# Patient Record
Sex: FEMALE | Race: WHITE | Employment: OTHER | URBAN - NONMETROPOLITAN AREA
[De-identification: names, ages, dates, MRNs, and addresses within clinical notes are randomized per-mention and may not be internally consistent; named-entity substitution may affect disease eponyms.]

---

## 2017-03-16 ENCOUNTER — HOSPITAL ENCOUNTER (OUTPATIENT)
Age: 62
Discharge: HOME | End: 2017-03-16
Payer: MEDICARE

## 2017-03-16 LAB
ANION GAP SERPL CALCULATED.3IONS-SCNC: 11 MMOL/L (ref 9–17)
BUN BLDV-MCNC: 27 MG/DL (ref 8–23)
BUN/CREAT BLD: 14 (ref 9–20)
CALCIUM IONIZED: 1.39 MMOL/L (ref 1.13–1.33)
CALCIUM SERPL-MCNC: 9 MG/DL (ref 8.6–10.4)
CHLORIDE BLD-SCNC: 110 MMOL/L (ref 98–107)
CO2: 22 MMOL/L (ref 20–31)
CREAT SERPL-MCNC: 1.9 MG/DL (ref 0.5–0.9)
CREATININE URINE: 52.7 MG/DL (ref 28–217)
GFR AFRICAN AMERICAN: 32 ML/MIN
GFR NON-AFRICAN AMERICAN: 27 ML/MIN
GFR SERPL CREATININE-BSD FRML MDRD: ABNORMAL ML/MIN/{1.73_M2}
GFR SERPL CREATININE-BSD FRML MDRD: ABNORMAL ML/MIN/{1.73_M2}
GLUCOSE BLD-MCNC: 97 MG/DL (ref 70–99)
POTASSIUM SERPL-SCNC: 5.2 MMOL/L (ref 3.7–5.3)
PTH INTACT: 141 PG/ML (ref 15–65)
SODIUM BLD-SCNC: 143 MMOL/L (ref 135–144)
TOTAL PROTEIN, URINE: 23 MG/DL
VITAMIN D 25-HYDROXY: 18.2 NG/ML (ref 30–100)

## 2017-03-16 PROCEDURE — 82330 ASSAY OF CALCIUM: CPT

## 2017-03-16 PROCEDURE — 36415 COLL VENOUS BLD VENIPUNCTURE: CPT

## 2017-03-16 PROCEDURE — 82306 VITAMIN D 25 HYDROXY: CPT

## 2017-03-16 PROCEDURE — 83970 ASSAY OF PARATHORMONE: CPT

## 2017-03-16 PROCEDURE — 80048 BASIC METABOLIC PNL TOTAL CA: CPT

## 2017-03-16 PROCEDURE — 84156 ASSAY OF PROTEIN URINE: CPT

## 2017-03-16 PROCEDURE — 82570 ASSAY OF URINE CREATININE: CPT

## 2017-05-13 ENCOUNTER — APPOINTMENT (OUTPATIENT)
Dept: GENERAL RADIOLOGY | Age: 62
End: 2017-05-13
Payer: MEDICARE

## 2017-05-13 ENCOUNTER — HOSPITAL ENCOUNTER (EMERGENCY)
Age: 62
Discharge: LEFT BEFORE DISCHARGE | End: 2017-05-13
Attending: EMERGENCY MEDICINE
Payer: MEDICARE

## 2017-05-13 VITALS
TEMPERATURE: 97.8 F | DIASTOLIC BLOOD PRESSURE: 72 MMHG | OXYGEN SATURATION: 95 % | HEART RATE: 54 BPM | SYSTOLIC BLOOD PRESSURE: 117 MMHG | RESPIRATION RATE: 16 BRPM

## 2017-05-13 DIAGNOSIS — M79.601 PAIN OF RIGHT UPPER EXTREMITY: Primary | ICD-10-CM

## 2017-05-13 LAB
% CKMB: 2 % (ref 0–3)
ABSOLUTE EOS #: 0.3 K/UL (ref 0–0.4)
ABSOLUTE LYMPH #: 1.8 K/UL (ref 1–4.8)
ABSOLUTE MONO #: 0.5 K/UL (ref 0–1)
ANION GAP SERPL CALCULATED.3IONS-SCNC: 12 MMOL/L (ref 9–17)
BASOPHILS # BLD: 1 %
BASOPHILS ABSOLUTE: 0 K/UL (ref 0–0.2)
BUN BLDV-MCNC: 33 MG/DL (ref 8–23)
BUN/CREAT BLD: 15 (ref 9–20)
CALCIUM SERPL-MCNC: 8.8 MG/DL (ref 8.6–10.4)
CHLORIDE BLD-SCNC: 108 MMOL/L (ref 98–107)
CK MB: 1.1 NG/ML
CK MB: 1.2 NG/ML
CKMB INTERPRETATION: NORMAL
CO2: 22 MMOL/L (ref 20–31)
CREAT SERPL-MCNC: 2.17 MG/DL (ref 0.5–0.9)
DIFFERENTIAL TYPE: ABNORMAL
EOSINOPHILS RELATIVE PERCENT: 4 %
GFR AFRICAN AMERICAN: 28 ML/MIN
GFR NON-AFRICAN AMERICAN: 23 ML/MIN
GFR SERPL CREATININE-BSD FRML MDRD: ABNORMAL ML/MIN/{1.73_M2}
GFR SERPL CREATININE-BSD FRML MDRD: ABNORMAL ML/MIN/{1.73_M2}
GLUCOSE BLD-MCNC: 102 MG/DL (ref 70–99)
HCT VFR BLD CALC: 35.1 % (ref 36–46)
HEMOGLOBIN: 11.3 G/DL (ref 12–16)
INR BLD: 1 (ref 0.9–1.2)
LYMPHOCYTES # BLD: 26 %
MCH RBC QN AUTO: 28.8 PG (ref 26–34)
MCHC RBC AUTO-ENTMCNC: 32.3 G/DL (ref 31–37)
MCV RBC AUTO: 89.2 FL (ref 80–100)
MONOCYTES # BLD: 8 %
MYOGLOBIN: 43 NG/ML (ref 25–58)
PARTIAL THROMBOPLASTIN TIME: 29.2 SEC (ref 23.2–34.4)
PDW BLD-RTO: 14.6 % (ref 12.1–15.2)
PLATELET # BLD: 151 K/UL (ref 140–450)
PLATELET ESTIMATE: ABNORMAL
PMV BLD AUTO: ABNORMAL FL (ref 6–12)
POTASSIUM SERPL-SCNC: 4.8 MMOL/L (ref 3.7–5.3)
PROTHROMBIN TIME: 10.2 SEC (ref 9.7–12.2)
RBC # BLD: 3.94 M/UL (ref 4–5.2)
RBC # BLD: ABNORMAL 10*6/UL
SEG NEUTROPHILS: 61 %
SEGMENTED NEUTROPHILS ABSOLUTE COUNT: 4.2 K/UL (ref 1.8–7.7)
SODIUM BLD-SCNC: 142 MMOL/L (ref 135–144)
TOTAL CK: 54 U/L (ref 26–192)
TROPONIN INTERP: NORMAL
TROPONIN INTERP: NORMAL
TROPONIN T: <0.03 NG/ML
TROPONIN T: <0.03 NG/ML
WBC # BLD: 6.8 K/UL (ref 3.5–11)
WBC # BLD: ABNORMAL 10*3/UL

## 2017-05-13 PROCEDURE — 99283 EMERGENCY DEPT VISIT LOW MDM: CPT

## 2017-05-13 PROCEDURE — 36415 COLL VENOUS BLD VENIPUNCTURE: CPT

## 2017-05-13 PROCEDURE — 85730 THROMBOPLASTIN TIME PARTIAL: CPT

## 2017-05-13 PROCEDURE — 85610 PROTHROMBIN TIME: CPT

## 2017-05-13 PROCEDURE — 84484 ASSAY OF TROPONIN QUANT: CPT

## 2017-05-13 PROCEDURE — 80048 BASIC METABOLIC PNL TOTAL CA: CPT

## 2017-05-13 PROCEDURE — 71010 XR CHEST PORTABLE: CPT

## 2017-05-13 PROCEDURE — 6370000000 HC RX 637 (ALT 250 FOR IP): Performed by: EMERGENCY MEDICINE

## 2017-05-13 PROCEDURE — 82550 ASSAY OF CK (CPK): CPT

## 2017-05-13 PROCEDURE — 85025 COMPLETE CBC W/AUTO DIFF WBC: CPT

## 2017-05-13 PROCEDURE — 83874 ASSAY OF MYOGLOBIN: CPT

## 2017-05-13 PROCEDURE — 82553 CREATINE MB FRACTION: CPT

## 2017-05-13 PROCEDURE — 93005 ELECTROCARDIOGRAM TRACING: CPT

## 2017-05-13 RX ORDER — ASPIRIN 81 MG/1
324 TABLET, CHEWABLE ORAL ONCE
Status: DISCONTINUED | OUTPATIENT
Start: 2017-05-13 | End: 2017-05-13 | Stop reason: HOSPADM

## 2017-05-13 RX ORDER — ASPIRIN 81 MG/1
324 TABLET, CHEWABLE ORAL ONCE
Status: COMPLETED | OUTPATIENT
Start: 2017-05-13 | End: 2017-05-13

## 2017-05-13 RX ADMIN — ASPIRIN 81 MG 324 MG: 81 TABLET ORAL at 06:22

## 2017-05-13 RX ADMIN — NITROGLYCERIN 0.5 INCH: 20 OINTMENT TOPICAL at 06:23

## 2017-05-13 ASSESSMENT — PAIN SCALES - GENERAL
PAINLEVEL_OUTOF10: 3
PAINLEVEL_OUTOF10: 2

## 2017-05-13 ASSESSMENT — PAIN DESCRIPTION - ORIENTATION
ORIENTATION: LEFT
ORIENTATION: LEFT

## 2017-05-13 ASSESSMENT — ENCOUNTER SYMPTOMS
ALLERGIC/IMMUNOLOGIC NEGATIVE: 1
EYES NEGATIVE: 1
GASTROINTESTINAL NEGATIVE: 1
RESPIRATORY NEGATIVE: 1

## 2017-05-13 ASSESSMENT — PAIN DESCRIPTION - LOCATION
LOCATION: ARM
LOCATION: ARM

## 2017-05-13 ASSESSMENT — PAIN DESCRIPTION - DESCRIPTORS: DESCRIPTORS: ACHING;SHARP

## 2017-05-13 ASSESSMENT — PAIN DESCRIPTION - PAIN TYPE: TYPE: ACUTE PAIN

## 2017-05-14 LAB
EKG ATRIAL RATE: 52 BPM
EKG ATRIAL RATE: 55 BPM
EKG P AXIS: 32 DEGREES
EKG P AXIS: 41 DEGREES
EKG P-R INTERVAL: 194 MS
EKG P-R INTERVAL: 194 MS
EKG Q-T INTERVAL: 422 MS
EKG Q-T INTERVAL: 444 MS
EKG QRS DURATION: 80 MS
EKG QRS DURATION: 88 MS
EKG QTC CALCULATION (BAZETT): 403 MS
EKG QTC CALCULATION (BAZETT): 412 MS
EKG R AXIS: -12 DEGREES
EKG R AXIS: 15 DEGREES
EKG T AXIS: 13 DEGREES
EKG T AXIS: 41 DEGREES
EKG VENTRICULAR RATE: 52 BPM
EKG VENTRICULAR RATE: 55 BPM

## 2017-05-15 ENCOUNTER — HOSPITAL ENCOUNTER (OUTPATIENT)
Dept: NON INVASIVE DIAGNOSTICS | Age: 62
Discharge: HOME | End: 2017-05-15

## 2017-05-15 ENCOUNTER — HOSPITAL ENCOUNTER (OUTPATIENT)
Dept: NON INVASIVE DIAGNOSTICS | Age: 62
Discharge: HOME | End: 2017-05-15
Payer: MEDICARE

## 2017-05-15 PROCEDURE — 93017 CV STRESS TEST TRACING ONLY: CPT

## 2017-05-15 PROCEDURE — 3430000000 HC RX DIAGNOSTIC RADIOPHARMACEUTICAL: Performed by: EMERGENCY MEDICINE

## 2017-05-15 PROCEDURE — 78452 HT MUSCLE IMAGE SPECT MULT: CPT

## 2017-05-15 PROCEDURE — 6360000002 HC RX W HCPCS: Performed by: FAMILY MEDICINE

## 2017-05-15 PROCEDURE — A9500 TC99M SESTAMIBI: HCPCS | Performed by: EMERGENCY MEDICINE

## 2017-05-15 RX ADMIN — REGADENOSON 0.4 MG: 0.08 INJECTION, SOLUTION INTRAVENOUS at 11:50

## 2017-05-15 RX ADMIN — Medication 10 MILLICURIE: at 10:46

## 2017-05-15 RX ADMIN — Medication 30 MILLICURIE: at 11:40

## 2018-02-22 ENCOUNTER — TELEPHONE (OUTPATIENT)
Dept: OBGYN | Age: 63
End: 2018-02-22

## 2018-05-31 ENCOUNTER — APPOINTMENT (OUTPATIENT)
Dept: CT IMAGING | Age: 63
End: 2018-05-31
Payer: MEDICARE

## 2018-05-31 ENCOUNTER — HOSPITAL ENCOUNTER (EMERGENCY)
Age: 63
Discharge: HOME | End: 2018-05-31
Attending: EMERGENCY MEDICINE
Payer: MEDICARE

## 2018-05-31 VITALS
RESPIRATION RATE: 13 BRPM | DIASTOLIC BLOOD PRESSURE: 94 MMHG | OXYGEN SATURATION: 95 % | HEART RATE: 58 BPM | SYSTOLIC BLOOD PRESSURE: 141 MMHG

## 2018-05-31 DIAGNOSIS — M79.601 RIGHT ARM PAIN: ICD-10-CM

## 2018-05-31 DIAGNOSIS — M79.602 LEFT ARM PAIN: Primary | ICD-10-CM

## 2018-05-31 LAB
ABSOLUTE EOS #: 0.15 K/UL (ref 0–0.44)
ABSOLUTE IMMATURE GRANULOCYTE: <0.03 K/UL (ref 0–0.3)
ABSOLUTE LYMPH #: 0.97 K/UL (ref 1.1–3.7)
ABSOLUTE MONO #: 0.54 K/UL (ref 0.1–1.2)
ALBUMIN SERPL-MCNC: 3.5 G/DL (ref 3.5–5.2)
ALBUMIN/GLOBULIN RATIO: 1.1 (ref 1–2.5)
ALP BLD-CCNC: 98 U/L (ref 35–104)
ALT SERPL-CCNC: 14 U/L (ref 5–33)
ANION GAP SERPL CALCULATED.3IONS-SCNC: 12 MMOL/L (ref 9–17)
AST SERPL-CCNC: 10 U/L
BASOPHILS # BLD: 0 % (ref 0–2)
BASOPHILS ABSOLUTE: 0.03 K/UL (ref 0–0.2)
BILIRUB SERPL-MCNC: <0.1 MG/DL (ref 0.3–1.2)
BUN BLDV-MCNC: 40 MG/DL (ref 8–23)
BUN/CREAT BLD: 16 (ref 9–20)
CALCIUM SERPL-MCNC: 8.9 MG/DL (ref 8.6–10.4)
CHLORIDE BLD-SCNC: 109 MMOL/L (ref 98–107)
CK MB: 1.9 NG/ML
CO2: 21 MMOL/L (ref 20–31)
CREAT SERPL-MCNC: 2.58 MG/DL (ref 0.5–0.9)
DIFFERENTIAL TYPE: ABNORMAL
EKG ATRIAL RATE: 58 BPM
EKG P AXIS: 72 DEGREES
EKG P-R INTERVAL: 198 MS
EKG Q-T INTERVAL: 392 MS
EKG QRS DURATION: 72 MS
EKG QTC CALCULATION (BAZETT): 384 MS
EKG R AXIS: -12 DEGREES
EKG T AXIS: 15 DEGREES
EKG VENTRICULAR RATE: 58 BPM
EOSINOPHILS RELATIVE PERCENT: 2 % (ref 1–4)
GFR AFRICAN AMERICAN: 23 ML/MIN
GFR NON-AFRICAN AMERICAN: 19 ML/MIN
GFR SERPL CREATININE-BSD FRML MDRD: ABNORMAL ML/MIN/{1.73_M2}
GFR SERPL CREATININE-BSD FRML MDRD: ABNORMAL ML/MIN/{1.73_M2}
GLUCOSE BLD-MCNC: 81 MG/DL
GLUCOSE BLD-MCNC: 81 MG/DL (ref 74–100)
GLUCOSE BLD-MCNC: 91 MG/DL (ref 70–99)
HCT VFR BLD CALC: 34.8 % (ref 36.3–47.1)
HEMOGLOBIN: 10.9 G/DL (ref 11.9–15.1)
IMMATURE GRANULOCYTES: 0 %
LYMPHOCYTES # BLD: 13 % (ref 24–43)
MCH RBC QN AUTO: 29.6 PG (ref 25.2–33.5)
MCHC RBC AUTO-ENTMCNC: 31.3 G/DL (ref 28.4–34.8)
MCV RBC AUTO: 94.6 FL (ref 82.6–102.9)
MONOCYTES # BLD: 7 % (ref 3–12)
MYOGLOBIN: 52 NG/ML (ref 25–58)
NRBC AUTOMATED: 0 PER 100 WBC
PDW BLD-RTO: 13.2 % (ref 11.8–14.4)
PLATELET # BLD: 155 K/UL (ref 138–453)
PLATELET ESTIMATE: ABNORMAL
PMV BLD AUTO: 10.1 FL (ref 8.1–13.5)
POTASSIUM SERPL-SCNC: 5.3 MMOL/L (ref 3.7–5.3)
RBC # BLD: 3.68 M/UL (ref 3.95–5.11)
RBC # BLD: ABNORMAL 10*6/UL
SEG NEUTROPHILS: 78 % (ref 36–65)
SEGMENTED NEUTROPHILS ABSOLUTE COUNT: 5.92 K/UL (ref 1.5–8.1)
SODIUM BLD-SCNC: 142 MMOL/L (ref 135–144)
TOTAL PROTEIN: 6.7 G/DL (ref 6.4–8.3)
TROPONIN INTERP: NORMAL
TROPONIN T: <0.03 NG/ML
WBC # BLD: 7.6 K/UL (ref 3.5–11.3)
WBC # BLD: ABNORMAL 10*3/UL

## 2018-05-31 PROCEDURE — 70450 CT HEAD/BRAIN W/O DYE: CPT

## 2018-05-31 PROCEDURE — 2580000003 HC RX 258: Performed by: EMERGENCY MEDICINE

## 2018-05-31 PROCEDURE — 80053 COMPREHEN METABOLIC PANEL: CPT

## 2018-05-31 PROCEDURE — 99284 EMERGENCY DEPT VISIT MOD MDM: CPT

## 2018-05-31 PROCEDURE — 85025 COMPLETE CBC W/AUTO DIFF WBC: CPT

## 2018-05-31 PROCEDURE — 82947 ASSAY GLUCOSE BLOOD QUANT: CPT

## 2018-05-31 PROCEDURE — 93005 ELECTROCARDIOGRAM TRACING: CPT

## 2018-05-31 PROCEDURE — 83874 ASSAY OF MYOGLOBIN: CPT

## 2018-05-31 PROCEDURE — 84484 ASSAY OF TROPONIN QUANT: CPT

## 2018-05-31 PROCEDURE — 82553 CREATINE MB FRACTION: CPT

## 2018-05-31 PROCEDURE — 72125 CT NECK SPINE W/O DYE: CPT

## 2018-05-31 PROCEDURE — 36415 COLL VENOUS BLD VENIPUNCTURE: CPT

## 2018-05-31 RX ORDER — 0.9 % SODIUM CHLORIDE 0.9 %
500 INTRAVENOUS SOLUTION INTRAVENOUS ONCE
Status: COMPLETED | OUTPATIENT
Start: 2018-05-31 | End: 2018-05-31

## 2018-05-31 RX ADMIN — SODIUM CHLORIDE 500 ML: 9 INJECTION, SOLUTION INTRAVENOUS at 15:10

## 2018-12-31 ENCOUNTER — HOSPITAL ENCOUNTER (OUTPATIENT)
Age: 63
Discharge: HOME | End: 2018-12-31
Payer: MEDICARE

## 2018-12-31 DIAGNOSIS — N18.30 CKD (CHRONIC KIDNEY DISEASE), STAGE III (HCC): ICD-10-CM

## 2018-12-31 LAB
ANION GAP SERPL CALCULATED.3IONS-SCNC: 11 MMOL/L (ref 9–17)
BUN BLDV-MCNC: 29 MG/DL (ref 8–23)
BUN/CREAT BLD: 12 (ref 9–20)
CALCIUM SERPL-MCNC: 9 MG/DL (ref 8.6–10.4)
CHLORIDE BLD-SCNC: 112 MMOL/L (ref 98–107)
CO2: 19 MMOL/L (ref 20–31)
CREAT SERPL-MCNC: 2.34 MG/DL (ref 0.5–0.9)
GFR AFRICAN AMERICAN: 25 ML/MIN
GFR NON-AFRICAN AMERICAN: 21 ML/MIN
GFR SERPL CREATININE-BSD FRML MDRD: ABNORMAL ML/MIN/{1.73_M2}
GFR SERPL CREATININE-BSD FRML MDRD: ABNORMAL ML/MIN/{1.73_M2}
GLUCOSE BLD-MCNC: 85 MG/DL (ref 70–99)
POTASSIUM SERPL-SCNC: 5.1 MMOL/L (ref 3.7–5.3)
SODIUM BLD-SCNC: 142 MMOL/L (ref 135–144)

## 2018-12-31 PROCEDURE — 80048 BASIC METABOLIC PNL TOTAL CA: CPT

## 2018-12-31 PROCEDURE — 36415 COLL VENOUS BLD VENIPUNCTURE: CPT

## 2019-01-02 DIAGNOSIS — N18.30 CKD (CHRONIC KIDNEY DISEASE) STAGE 3, GFR 30-59 ML/MIN (HCC): Primary | Chronic | ICD-10-CM

## 2019-01-02 RX ORDER — SODIUM BICARBONATE 650 MG/1
650 TABLET ORAL 2 TIMES DAILY
Qty: 60 TABLET | Refills: 3 | Status: SHIPPED | OUTPATIENT
Start: 2019-01-02 | End: 2019-02-01

## 2019-03-29 ENCOUNTER — APPOINTMENT (OUTPATIENT)
Dept: CT IMAGING | Age: 64
End: 2019-03-29
Payer: MEDICARE

## 2019-03-29 ENCOUNTER — HOSPITAL ENCOUNTER (EMERGENCY)
Age: 64
Discharge: HOME | End: 2019-03-29
Payer: MEDICARE

## 2019-03-29 VITALS
OXYGEN SATURATION: 92 % | SYSTOLIC BLOOD PRESSURE: 121 MMHG | TEMPERATURE: 98.2 F | DIASTOLIC BLOOD PRESSURE: 46 MMHG | RESPIRATION RATE: 16 BRPM | HEART RATE: 76 BPM

## 2019-03-29 DIAGNOSIS — R10.30 LOWER ABDOMINAL PAIN: Primary | ICD-10-CM

## 2019-03-29 LAB
-: ABNORMAL
ABSOLUTE EOS #: 0.25 K/UL (ref 0–0.44)
ABSOLUTE IMMATURE GRANULOCYTE: 0.04 K/UL (ref 0–0.3)
ABSOLUTE LYMPH #: 1.27 K/UL (ref 1.1–3.7)
ABSOLUTE MONO #: 0.79 K/UL (ref 0.1–1.2)
ALBUMIN SERPL-MCNC: 3.9 G/DL (ref 3.5–5.2)
ALBUMIN/GLOBULIN RATIO: 1.1 (ref 1–2.5)
ALP BLD-CCNC: 103 U/L (ref 35–104)
ALT SERPL-CCNC: 15 U/L (ref 5–33)
AMORPHOUS: ABNORMAL
ANION GAP SERPL CALCULATED.3IONS-SCNC: 13 MMOL/L (ref 9–17)
AST SERPL-CCNC: 13 U/L
BACTERIA: ABNORMAL
BASOPHILS # BLD: 0 % (ref 0–2)
BASOPHILS ABSOLUTE: 0.04 K/UL (ref 0–0.2)
BILIRUB SERPL-MCNC: 0.16 MG/DL (ref 0.3–1.2)
BILIRUBIN URINE: NEGATIVE
BUN BLDV-MCNC: 35 MG/DL (ref 8–23)
BUN/CREAT BLD: 14 (ref 9–20)
CALCIUM SERPL-MCNC: 9.6 MG/DL (ref 8.6–10.4)
CASTS UA: ABNORMAL /LPF
CHLORIDE BLD-SCNC: 105 MMOL/L (ref 98–107)
CO2: 18 MMOL/L (ref 20–31)
COLOR: YELLOW
COMMENT UA: ABNORMAL
CREAT SERPL-MCNC: 2.51 MG/DL (ref 0.5–0.9)
CRYSTALS, UA: ABNORMAL /HPF
DIFFERENTIAL TYPE: ABNORMAL
EOSINOPHILS RELATIVE PERCENT: 2 % (ref 1–4)
EPITHELIAL CELLS UA: ABNORMAL /HPF (ref 0–25)
GFR AFRICAN AMERICAN: 23 ML/MIN
GFR NON-AFRICAN AMERICAN: 19 ML/MIN
GFR SERPL CREATININE-BSD FRML MDRD: ABNORMAL ML/MIN/{1.73_M2}
GFR SERPL CREATININE-BSD FRML MDRD: ABNORMAL ML/MIN/{1.73_M2}
GLUCOSE BLD-MCNC: 95 MG/DL (ref 70–99)
GLUCOSE URINE: NEGATIVE
HCT VFR BLD CALC: 35.9 % (ref 36.3–47.1)
HEMOGLOBIN: 10.8 G/DL (ref 11.9–15.1)
IMMATURE GRANULOCYTES: 0 %
KETONES, URINE: NEGATIVE
LACTIC ACID, WHOLE BLOOD: NORMAL MMOL/L (ref 0.7–2.1)
LACTIC ACID: 0.8 MMOL/L (ref 0.5–2.2)
LEUKOCYTE ESTERASE, URINE: NEGATIVE
LIPASE: 37 U/L (ref 13–60)
LYMPHOCYTES # BLD: 12 % (ref 24–43)
MCH RBC QN AUTO: 29.3 PG (ref 25.2–33.5)
MCHC RBC AUTO-ENTMCNC: 30.1 G/DL (ref 28.4–34.8)
MCV RBC AUTO: 97.3 FL (ref 82.6–102.9)
MONOCYTES # BLD: 7 % (ref 3–12)
MUCUS: ABNORMAL
NITRITE, URINE: NEGATIVE
NRBC AUTOMATED: 0 PER 100 WBC
OTHER OBSERVATIONS UA: ABNORMAL
PDW BLD-RTO: 13.4 % (ref 11.8–14.4)
PH UA: 5.5 (ref 5–9)
PLATELET # BLD: 155 K/UL (ref 138–453)
PLATELET ESTIMATE: ABNORMAL
PMV BLD AUTO: 11.4 FL (ref 8.1–13.5)
POTASSIUM SERPL-SCNC: 5 MMOL/L (ref 3.7–5.3)
PROTEIN UA: ABNORMAL
RBC # BLD: 3.69 M/UL (ref 3.95–5.11)
RBC # BLD: ABNORMAL 10*6/UL
RBC UA: ABNORMAL /HPF (ref 0–2)
RENAL EPITHELIAL, UA: ABNORMAL /HPF
SEG NEUTROPHILS: 79 % (ref 36–65)
SEGMENTED NEUTROPHILS ABSOLUTE COUNT: 8.65 K/UL (ref 1.5–8.1)
SODIUM BLD-SCNC: 136 MMOL/L (ref 135–144)
SPECIFIC GRAVITY UA: 1.02 (ref 1.01–1.02)
TOTAL PROTEIN: 7.5 G/DL (ref 6.4–8.3)
TRICHOMONAS: ABNORMAL
TURBIDITY: CLEAR
URINE HGB: ABNORMAL
UROBILINOGEN, URINE: NORMAL
WBC # BLD: 11 K/UL (ref 3.5–11.3)
WBC # BLD: ABNORMAL 10*3/UL
WBC UA: ABNORMAL /HPF (ref 0–5)
YEAST: ABNORMAL

## 2019-03-29 PROCEDURE — 36415 COLL VENOUS BLD VENIPUNCTURE: CPT

## 2019-03-29 PROCEDURE — 96375 TX/PRO/DX INJ NEW DRUG ADDON: CPT

## 2019-03-29 PROCEDURE — 83605 ASSAY OF LACTIC ACID: CPT

## 2019-03-29 PROCEDURE — 85025 COMPLETE CBC W/AUTO DIFF WBC: CPT

## 2019-03-29 PROCEDURE — 83690 ASSAY OF LIPASE: CPT

## 2019-03-29 PROCEDURE — 74176 CT ABD & PELVIS W/O CONTRAST: CPT

## 2019-03-29 PROCEDURE — 80053 COMPREHEN METABOLIC PANEL: CPT

## 2019-03-29 PROCEDURE — 6360000002 HC RX W HCPCS: Performed by: PHYSICIAN ASSISTANT

## 2019-03-29 PROCEDURE — 81001 URINALYSIS AUTO W/SCOPE: CPT

## 2019-03-29 PROCEDURE — 96374 THER/PROPH/DIAG INJ IV PUSH: CPT

## 2019-03-29 PROCEDURE — 99284 EMERGENCY DEPT VISIT MOD MDM: CPT

## 2019-03-29 RX ORDER — PROMETHAZINE HYDROCHLORIDE 25 MG/ML
12.5 INJECTION, SOLUTION INTRAMUSCULAR; INTRAVENOUS ONCE
Status: COMPLETED | OUTPATIENT
Start: 2019-03-29 | End: 2019-03-29

## 2019-03-29 RX ORDER — MORPHINE SULFATE 4 MG/ML
4 INJECTION, SOLUTION INTRAMUSCULAR; INTRAVENOUS ONCE
Status: COMPLETED | OUTPATIENT
Start: 2019-03-29 | End: 2019-03-29

## 2019-03-29 RX ORDER — HYDROCODONE BITARTRATE AND ACETAMINOPHEN 5; 325 MG/1; MG/1
1 TABLET ORAL EVERY 6 HOURS PRN
Qty: 10 TABLET | Refills: 0 | Status: ON HOLD | OUTPATIENT
Start: 2019-03-29 | End: 2019-04-05 | Stop reason: HOSPADM

## 2019-03-29 RX ADMIN — MORPHINE SULFATE 4 MG: 4 INJECTION INTRAVENOUS at 17:09

## 2019-03-29 RX ADMIN — PROMETHAZINE HYDROCHLORIDE 12.5 MG: 25 INJECTION INTRAMUSCULAR; INTRAVENOUS at 17:06

## 2019-03-29 ASSESSMENT — PAIN DESCRIPTION - ORIENTATION: ORIENTATION: LOWER

## 2019-03-29 ASSESSMENT — PAIN SCALES - GENERAL
PAINLEVEL_OUTOF10: 0
PAINLEVEL_OUTOF10: 10

## 2019-03-29 ASSESSMENT — PAIN DESCRIPTION - PAIN TYPE: TYPE: ACUTE PAIN

## 2019-03-29 ASSESSMENT — PAIN DESCRIPTION - LOCATION: LOCATION: ABDOMEN

## 2019-03-29 NOTE — ED NOTES
Discussed discharge instructions with patient and daughter. Per Radiology, patient informed to call scheduling on Monday morning to schedule outpatient US.       Molly Lee RN  03/29/19 2399

## 2019-03-29 NOTE — ED PROVIDER NOTES
[facility] ED  EMERGENCY DEPARTMENT ENCOUNTER      Pt Name: Sarthak Keys  MRN: [id number]  [place] 1955  Date of evaluation: 3/29/2019  Provider: Rodrigo Zavala PA-C    CHIEF COMPLAINT       Chief Complaint   Patient presents with    Abdominal Pain     lower abd pain        HISTORY OF PRESENT ILLNESS    Sarthak Keys is a 70 y.o. female who presents to the emergency department from home with complaint of lower abdominal pain. The patient has acute communication issues due to chronic expressive aphasia from previous cerebral aneurysm and hemorrhage. I do also obtained some history from her daughter who arrives after I initially saw the patient. The patient is able to communicate somewhat indicating that she has sure pain in her lower abdomen and does not radiate to her back or chest started around noon today and is progressively worse no fever or vomiting she states it does hurt to urinate. Triage notes and Nursing notes were reviewed by myself. Any discrepancies are addressed above. PAST MEDICAL HISTORY     Past Medical History:   Diagnosis Date    Cerebral aneurysm 1997    Chronic renal insufficiency     Intracranial hemorrhage ([facility] [address] 1997    Obesity     Respiratory failure, acute ([address] 1997    Seizures ([facility] [address]     no seizure since before 2002       SURGICAL HISTORY       Past Surgical History:   Procedure Laterality Date    CRANIOTOMY      microdissection    GASTROSTOMY TUBE PLACEMENT      TRACHEOSTOMY         CURRENT MEDICATIONS       Previous Medications    ASPIRIN 81 MG EC TABLET    Take 81 mg by mouth daily. BELLADONNA ALK-PHENOBARBITAL PO    Take  by mouth. CARBAMAZEPINE (TEGRETOL XR) 100 MG SR TABLET    Take 100 mg by mouth 2 times daily. ENALAPRIL (VASOTEC) 2.5 MG TABLET    Take 2.5 mg by mouth daily. HYDROCODONE-ACETAMINOPHEN (VICODIN) 5-500 MG PER TABLET    Take 1 tablet by mouth every 6 hours as needed.       MELATONIN 3 MG TABS    Take 3 mg by mouth nightly. METFORMIN (GLUCOPHAGE) 500 MG TABLET    Take 500 mg by mouth daily (with breakfast). VITAMIN D (CHOLECALCIFEROL) 54483 UNIT CAPS    Take 1 capsule by mouth once a week for 8 doses And then one cap every month for the next 4 months       ALLERGIES     Pcn [penicillins]    FAMILY HISTORY     History reviewed. No pertinent family history. SOCIAL HISTORY       Social History     Socioeconomic History    Marital status:      Spouse name: None    Number of children: None    Years of education: None    Highest education level: None   Occupational History    None   Social Needs    Financial resource strain: None    Food insecurity:     Worry: None     Inability: None    Transportation needs:     Medical: None     Non-medical: None   Tobacco Use    Smoking status: Former Smoker     Packs/day: 1.00     Years: 15.00     Pack years: 15.00     Types: Cigarettes    Smokeless tobacco: Never Used   Substance and Sexual Activity    Alcohol use: No    Drug use: None    Sexual activity: None   Lifestyle    Physical activity:     Days per week: None     Minutes per session: None    Stress: None   Relationships    Social connections:     Talks on phone: None     Gets together: None     Attends Mosque service: None     Active member of club or organization: None     Attends meetings of clubs or organizations: None     Relationship status: None    Intimate partner violence:     Fear of current or ex partner: None     Emotionally abused: None     Physically abused: None     Forced sexual activity: None   Other Topics Concern    None   Social History Narrative    None       REVIEW OF SYSTEMS     Review of Systems  Except as noted above the remainder of the review of systems was reviewed and is negative.      PHYSICAL EXAM    (up to 7 for level 4, 8 or more for level 5)     ED Triage Vitals [03/29/19 1638]   BP Temp Temp Source Pulse Resp SpO2 Height Weight   (!) 168/94 98.2 °F (36.8 °C) Tympanic 76 16 93 % -- --       Physical Exam  Active and oriented ×3. Nontoxic. No acute distress. Well-hydrated. Orbitally obese, patient is anxious pacing around the room on initial evaluation  Head is atraumatic, facies symmetrical.  Pupils equal round and reactive to light, extraocular movements intact, anterior chambers clear bilaterally  Neck is supple. No adenopathy. Respirations nonlabored. Lungs clear to auscultation. No wheezes rales or rhonchi noted. Heart regular rate and rhythm. No murmur noted  Abdomen positive bowel sounds, no bruit. Large panus noted, tenderness noted in the lower abdomen bilaterally with no rigidity noted. No pulsatile mass noted  Skin free of any obvious rashes or lesions. Extremities without edema. No calf tenderness noted. Distal pulses and sensation intact. Good capillary refill noted. No acute neurologic deficit noted. Good gait and balance. The patient does have expressive aphasia which has been chronic      DIAGNOSTIC RESULTS       RADIOLOGY: (none if blank)   Interpretation per the Radiologistbelow, if available at the time of this note:    CT ABDOMEN PELVIS WO CONTRAST Additional Contrast? None   Preliminary Result   1. Distension of the endometrial canal with complex fluid and probable   hemorrhage. Increased soft tissue density towards the cervix. The findings   are concerning for underlying malignancy. Ultrasound correlation and   surgical consultation recommended. 2. Mixed attenuation within the gallbladder, likely cholelithiasis and   sludge. No acute features. 3. Bilateral renal cortical atrophy and scarring. Mild left-sided   pyelocaliectasis and ureterectasis. No obstructing calculi identified. 4. Diverticulosis with no acute features.          US PELVIS COMPLETE    (Results Pending)       LABS:  Labs Reviewed   URINALYSIS - Abnormal; Notable for the following components:       Result Value    Specific Gravity, UA 1.025 (*) Urine Hgb TRACE (*)     Protein, UA 1+ (*)     All other components within normal limits   CBC WITH AUTO DIFFERENTIAL - Abnormal; Notable for the following components:    RBC 3.69 (*)     Hemoglobin 10.8 (*)     Hematocrit 35.9 (*)     Seg Neutrophils 79 (*)     Lymphocytes 12 (*)     Segs Absolute 8.65 (*)     All other components within normal limits   COMPREHENSIVE METABOLIC PANEL W/ REFLEX TO MG FOR LOW K - Abnormal; Notable for the following components:    BUN 35 (*)     CREATININE 2.51 (*)     CO2 18 (*)     Total Bilirubin 0.16 (*)     GFR Non- 19 (*)     GFR  23 (*)     All other components within normal limits   MICROSCOPIC URINALYSIS - Abnormal; Notable for the following components:    Bacteria, UA 1+ (*)     Amorphous, UA 1+ (*)     All other components within normal limits   LIPASE   LACTIC ACID, PLASMA       All other labs were within normal range or not returned as of this dictation. EMERGENCY DEPARTMENT COURSE andMedical Decision Making:     MDM/    This case was discussed with the attending emergency physician Dr. Arther Ahumada  She was treated with morphine and Phenergan after which she was pain-free. There is no significant leukocytosis noted, anemia is consistent with previous blood tests, no significant electrolyte abnormality no sign of acute pancreatitis. The patient does have findings on her CT scan that are concerning for possible neoplasm in the pelvis within the uterus or the surrounding tissues. I discussed this in detail with the patient and her daughter and strongly recommend follow-up with OB/GYN for further evaluation and consideration for surgical exploration although does not appear to be emergent time it is serious enough to warrant close follow-up this week. I do order outpatient ultrasound for the patient for further evaluation it is not available to be performed through the ER at this time.     I did write a prescription for a small amount

## 2019-03-29 NOTE — ED NOTES
Oxygen therapy initiated, patient o2 level 89% due to administration of analgesia, 1L applied via nasal cannula o2 level is now 94%     Marybel Irwin RN  03/29/19 2972

## 2019-03-31 ENCOUNTER — HOSPITAL ENCOUNTER (INPATIENT)
Age: 64
LOS: 3 days | Discharge: HOME | DRG: 746 | End: 2019-04-05
Attending: EMERGENCY MEDICINE | Admitting: INTERNAL MEDICINE
Payer: MEDICARE

## 2019-03-31 DIAGNOSIS — N18.30 ACUTE RENAL FAILURE WITH ACUTE TUBULAR NECROSIS SUPERIMPOSED ON STAGE 3 CHRONIC KIDNEY DISEASE (HCC): ICD-10-CM

## 2019-03-31 DIAGNOSIS — N18.4 CKD (CHRONIC KIDNEY DISEASE) STAGE 4, GFR 15-29 ML/MIN (HCC): ICD-10-CM

## 2019-03-31 DIAGNOSIS — N18.9 ACUTE RENAL FAILURE SUPERIMPOSED ON CHRONIC KIDNEY DISEASE, UNSPECIFIED CKD STAGE, UNSPECIFIED ACUTE RENAL FAILURE TYPE (HCC): ICD-10-CM

## 2019-03-31 DIAGNOSIS — N89.8 VAGINAL MASS: ICD-10-CM

## 2019-03-31 DIAGNOSIS — R10.9 ABDOMINAL PAIN, UNSPECIFIED ABDOMINAL LOCATION: ICD-10-CM

## 2019-03-31 DIAGNOSIS — N17.0 ACUTE RENAL FAILURE WITH ACUTE TUBULAR NECROSIS SUPERIMPOSED ON STAGE 3 CHRONIC KIDNEY DISEASE (HCC): ICD-10-CM

## 2019-03-31 DIAGNOSIS — N85.9 ENDOMETRIAL DISORDER: Primary | ICD-10-CM

## 2019-03-31 DIAGNOSIS — D50.0 ANEMIA DUE TO BLOOD LOSS: ICD-10-CM

## 2019-03-31 DIAGNOSIS — R10.2 PELVIC PAIN: ICD-10-CM

## 2019-03-31 DIAGNOSIS — N17.9 ACUTE RENAL FAILURE SUPERIMPOSED ON CHRONIC KIDNEY DISEASE, UNSPECIFIED CKD STAGE, UNSPECIFIED ACUTE RENAL FAILURE TYPE (HCC): ICD-10-CM

## 2019-03-31 LAB
-: NORMAL
ABSOLUTE EOS #: 0.39 K/UL (ref 0–0.44)
ABSOLUTE IMMATURE GRANULOCYTE: 0.04 K/UL (ref 0–0.3)
ABSOLUTE LYMPH #: 1.3 K/UL (ref 1.1–3.7)
ABSOLUTE MONO #: 0.66 K/UL (ref 0.1–1.2)
ALBUMIN SERPL-MCNC: 3.4 G/DL (ref 3.5–5.2)
ALBUMIN/GLOBULIN RATIO: 1.1 (ref 1–2.5)
ALP BLD-CCNC: 81 U/L (ref 35–104)
ALT SERPL-CCNC: 12 U/L (ref 5–33)
AMORPHOUS: NORMAL
ANION GAP SERPL CALCULATED.3IONS-SCNC: 14 MMOL/L (ref 9–17)
AST SERPL-CCNC: 10 U/L
BACTERIA: NORMAL
BASOPHILS # BLD: 1 % (ref 0–2)
BASOPHILS ABSOLUTE: 0.05 K/UL (ref 0–0.2)
BILIRUB SERPL-MCNC: <0.1 MG/DL (ref 0.3–1.2)
BILIRUBIN URINE: NEGATIVE
BUN BLDV-MCNC: 37 MG/DL (ref 8–23)
BUN/CREAT BLD: 14 (ref 9–20)
CALCIUM SERPL-MCNC: 8.9 MG/DL (ref 8.6–10.4)
CASTS UA: NORMAL /LPF
CHLORIDE BLD-SCNC: 108 MMOL/L (ref 98–107)
CO2: 18 MMOL/L (ref 20–31)
COLOR: YELLOW
COMMENT UA: ABNORMAL
CREAT SERPL-MCNC: 2.72 MG/DL (ref 0.5–0.9)
CRYSTALS, UA: NORMAL /HPF
DIFFERENTIAL TYPE: ABNORMAL
EOSINOPHILS RELATIVE PERCENT: 5 % (ref 1–4)
EPITHELIAL CELLS UA: NORMAL /HPF (ref 0–25)
GFR AFRICAN AMERICAN: 21 ML/MIN
GFR NON-AFRICAN AMERICAN: 18 ML/MIN
GFR SERPL CREATININE-BSD FRML MDRD: ABNORMAL ML/MIN/{1.73_M2}
GFR SERPL CREATININE-BSD FRML MDRD: ABNORMAL ML/MIN/{1.73_M2}
GLUCOSE BLD-MCNC: 129 MG/DL (ref 70–99)
GLUCOSE URINE: NEGATIVE
HCT VFR BLD CALC: 31.7 % (ref 36.3–47.1)
HEMOGLOBIN: 9.4 G/DL (ref 11.9–15.1)
IMMATURE GRANULOCYTES: 1 %
KETONES, URINE: NEGATIVE
LACTIC ACID, WHOLE BLOOD: NORMAL MMOL/L (ref 0.7–2.1)
LACTIC ACID: 0.6 MMOL/L (ref 0.5–2.2)
LEUKOCYTE ESTERASE, URINE: NEGATIVE
LIPASE: 27 U/L (ref 13–60)
LYMPHOCYTES # BLD: 16 % (ref 24–43)
MCH RBC QN AUTO: 29 PG (ref 25.2–33.5)
MCHC RBC AUTO-ENTMCNC: 29.7 G/DL (ref 28.4–34.8)
MCV RBC AUTO: 97.8 FL (ref 82.6–102.9)
MONOCYTES # BLD: 8 % (ref 3–12)
MUCUS: NORMAL
NITRITE, URINE: NEGATIVE
NRBC AUTOMATED: 0 PER 100 WBC
OTHER OBSERVATIONS UA: NORMAL
PDW BLD-RTO: 13.5 % (ref 11.8–14.4)
PH UA: 5.5 (ref 5–9)
PLATELET # BLD: ABNORMAL K/UL (ref 138–453)
PLATELET ESTIMATE: ABNORMAL
PLATELET, FLUORESCENCE: 125 K/UL (ref 138–453)
PLATELET, IMMATURE FRACTION: 3.3 % (ref 1.1–10.3)
PMV BLD AUTO: ABNORMAL FL (ref 8.1–13.5)
POTASSIUM SERPL-SCNC: 4.3 MMOL/L (ref 3.7–5.3)
PROTEIN UA: ABNORMAL
RBC # BLD: 3.24 M/UL (ref 3.95–5.11)
RBC # BLD: ABNORMAL 10*6/UL
RBC UA: NORMAL /HPF (ref 0–2)
RENAL EPITHELIAL, UA: NORMAL /HPF
SEG NEUTROPHILS: 70 % (ref 36–65)
SEGMENTED NEUTROPHILS ABSOLUTE COUNT: 5.6 K/UL (ref 1.5–8.1)
SODIUM BLD-SCNC: 140 MMOL/L (ref 135–144)
SPECIFIC GRAVITY UA: 1.01 (ref 1.01–1.02)
TOTAL PROTEIN: 6.5 G/DL (ref 6.4–8.3)
TRICHOMONAS: NORMAL
TURBIDITY: CLEAR
URINE HGB: NEGATIVE
UROBILINOGEN, URINE: NORMAL
WBC # BLD: 8 K/UL (ref 3.5–11.3)
WBC # BLD: ABNORMAL 10*3/UL
WBC UA: NORMAL /HPF (ref 0–5)
YEAST: NORMAL

## 2019-03-31 PROCEDURE — 51702 INSERT TEMP BLADDER CATH: CPT

## 2019-03-31 PROCEDURE — 96375 TX/PRO/DX INJ NEW DRUG ADDON: CPT

## 2019-03-31 PROCEDURE — 85025 COMPLETE CBC W/AUTO DIFF WBC: CPT

## 2019-03-31 PROCEDURE — 81001 URINALYSIS AUTO W/SCOPE: CPT

## 2019-03-31 PROCEDURE — 99285 EMERGENCY DEPT VISIT HI MDM: CPT

## 2019-03-31 PROCEDURE — 6370000000 HC RX 637 (ALT 250 FOR IP): Performed by: INTERNAL MEDICINE

## 2019-03-31 PROCEDURE — 36415 COLL VENOUS BLD VENIPUNCTURE: CPT

## 2019-03-31 PROCEDURE — 80053 COMPREHEN METABOLIC PANEL: CPT

## 2019-03-31 PROCEDURE — 2580000003 HC RX 258: Performed by: EMERGENCY MEDICINE

## 2019-03-31 PROCEDURE — G0378 HOSPITAL OBSERVATION PER HR: HCPCS

## 2019-03-31 PROCEDURE — 85055 RETICULATED PLATELET ASSAY: CPT

## 2019-03-31 PROCEDURE — 83605 ASSAY OF LACTIC ACID: CPT

## 2019-03-31 PROCEDURE — 96374 THER/PROPH/DIAG INJ IV PUSH: CPT

## 2019-03-31 PROCEDURE — 2580000003 HC RX 258: Performed by: INTERNAL MEDICINE

## 2019-03-31 PROCEDURE — 83690 ASSAY OF LIPASE: CPT

## 2019-03-31 PROCEDURE — 6360000002 HC RX W HCPCS: Performed by: EMERGENCY MEDICINE

## 2019-03-31 RX ORDER — CARBAMAZEPINE 100 MG/1
100 TABLET, EXTENDED RELEASE ORAL 2 TIMES DAILY
Status: DISCONTINUED | OUTPATIENT
Start: 2019-03-31 | End: 2019-04-01

## 2019-03-31 RX ORDER — POLYETHYLENE GLYCOL 3350 17 G/17G
17 POWDER, FOR SOLUTION ORAL DAILY PRN
Status: DISCONTINUED | OUTPATIENT
Start: 2019-03-31 | End: 2019-04-05 | Stop reason: HOSPADM

## 2019-03-31 RX ORDER — UREA 10 %
3 LOTION (ML) TOPICAL NIGHTLY
Status: DISCONTINUED | OUTPATIENT
Start: 2019-03-31 | End: 2019-04-05 | Stop reason: HOSPADM

## 2019-03-31 RX ORDER — KETOROLAC TROMETHAMINE 15 MG/ML
15 INJECTION, SOLUTION INTRAMUSCULAR; INTRAVENOUS ONCE
Status: COMPLETED | OUTPATIENT
Start: 2019-03-31 | End: 2019-03-31

## 2019-03-31 RX ORDER — OXYCODONE AND ACETAMINOPHEN 10; 325 MG/1; MG/1
1 TABLET ORAL EVERY 4 HOURS PRN
Status: DISCONTINUED | OUTPATIENT
Start: 2019-03-31 | End: 2019-04-05 | Stop reason: HOSPADM

## 2019-03-31 RX ORDER — 0.9 % SODIUM CHLORIDE 0.9 %
1000 INTRAVENOUS SOLUTION INTRAVENOUS ONCE
Status: COMPLETED | OUTPATIENT
Start: 2019-03-31 | End: 2019-03-31

## 2019-03-31 RX ORDER — ERGOCALCIFEROL 1.25 MG/1
50000 CAPSULE ORAL WEEKLY
Status: DISCONTINUED | OUTPATIENT
Start: 2019-03-31 | End: 2019-04-05 | Stop reason: HOSPADM

## 2019-03-31 RX ORDER — ACETAMINOPHEN 325 MG/1
650 TABLET ORAL EVERY 4 HOURS PRN
Status: DISCONTINUED | OUTPATIENT
Start: 2019-03-31 | End: 2019-04-05 | Stop reason: HOSPADM

## 2019-03-31 RX ORDER — ENALAPRIL MALEATE 2.5 MG/1
2.5 TABLET ORAL DAILY
Status: DISCONTINUED | OUTPATIENT
Start: 2019-03-31 | End: 2019-04-03

## 2019-03-31 RX ORDER — SODIUM CHLORIDE 0.9 % (FLUSH) 0.9 %
10 SYRINGE (ML) INJECTION PRN
Status: DISCONTINUED | OUTPATIENT
Start: 2019-03-31 | End: 2019-04-05 | Stop reason: HOSPADM

## 2019-03-31 RX ORDER — MORPHINE SULFATE 4 MG/ML
4 INJECTION, SOLUTION INTRAMUSCULAR; INTRAVENOUS ONCE
Status: COMPLETED | OUTPATIENT
Start: 2019-03-31 | End: 2019-03-31

## 2019-03-31 RX ORDER — ONDANSETRON 2 MG/ML
4 INJECTION INTRAMUSCULAR; INTRAVENOUS ONCE
Status: COMPLETED | OUTPATIENT
Start: 2019-03-31 | End: 2019-03-31

## 2019-03-31 RX ORDER — ASPIRIN 81 MG/1
81 TABLET ORAL DAILY
Status: DISCONTINUED | OUTPATIENT
Start: 2019-03-31 | End: 2019-04-05 | Stop reason: HOSPADM

## 2019-03-31 RX ORDER — SODIUM CHLORIDE 0.9 % (FLUSH) 0.9 %
10 SYRINGE (ML) INJECTION EVERY 12 HOURS SCHEDULED
Status: DISCONTINUED | OUTPATIENT
Start: 2019-03-31 | End: 2019-04-05 | Stop reason: HOSPADM

## 2019-03-31 RX ADMIN — CARBAMAZEPINE 100 MG: 100 TABLET, EXTENDED RELEASE ORAL at 11:07

## 2019-03-31 RX ADMIN — KETOROLAC TROMETHAMINE 15 MG: 15 INJECTION, SOLUTION INTRAMUSCULAR; INTRAVENOUS at 05:08

## 2019-03-31 RX ADMIN — MORPHINE SULFATE 4 MG: 4 INJECTION INTRAVENOUS at 05:08

## 2019-03-31 RX ADMIN — SODIUM CHLORIDE 1000 ML: 9 INJECTION, SOLUTION INTRAVENOUS at 05:08

## 2019-03-31 RX ADMIN — ONDANSETRON 4 MG: 2 INJECTION INTRAMUSCULAR; INTRAVENOUS at 05:08

## 2019-03-31 RX ADMIN — CARBAMAZEPINE 100 MG: 100 TABLET, EXTENDED RELEASE ORAL at 20:18

## 2019-03-31 RX ADMIN — POLYETHYLENE GLYCOL (3350) 17 G: 17 POWDER, FOR SOLUTION ORAL at 08:18

## 2019-03-31 RX ADMIN — OXYCODONE AND ACETAMINOPHEN 1 TABLET: 10; 325 TABLET ORAL at 20:19

## 2019-03-31 RX ADMIN — ENALAPRIL MALEATE 2.5 MG: 2.5 TABLET ORAL at 11:07

## 2019-03-31 RX ADMIN — Medication 10 ML: at 20:19

## 2019-03-31 RX ADMIN — ASPIRIN 81 MG: 81 TABLET, COATED ORAL at 11:07

## 2019-03-31 RX ADMIN — OXYCODONE AND ACETAMINOPHEN 1 TABLET: 10; 325 TABLET ORAL at 08:18

## 2019-03-31 RX ADMIN — Medication 3 MG: at 20:18

## 2019-03-31 RX ADMIN — Medication 10 ML: at 08:20

## 2019-03-31 ASSESSMENT — PAIN DESCRIPTION - LOCATION
LOCATION: ABDOMEN

## 2019-03-31 ASSESSMENT — PAIN DESCRIPTION - PAIN TYPE
TYPE: ACUTE PAIN

## 2019-03-31 ASSESSMENT — PAIN DESCRIPTION - DESCRIPTORS
DESCRIPTORS: PATIENT UNABLE TO DESCRIBE
DESCRIPTORS: SHARP

## 2019-03-31 ASSESSMENT — PAIN SCALES - GENERAL
PAINLEVEL_OUTOF10: 0
PAINLEVEL_OUTOF10: 9
PAINLEVEL_OUTOF10: 8
PAINLEVEL_OUTOF10: 6
PAINLEVEL_OUTOF10: 0
PAINLEVEL_OUTOF10: 10
PAINLEVEL_OUTOF10: 8
PAINLEVEL_OUTOF10: 8
PAINLEVEL_OUTOF10: 0

## 2019-03-31 ASSESSMENT — PAIN DESCRIPTION - FREQUENCY
FREQUENCY: CONTINUOUS

## 2019-03-31 ASSESSMENT — PAIN DESCRIPTION - ORIENTATION
ORIENTATION: MID;LOWER
ORIENTATION: MID
ORIENTATION: LOWER

## 2019-03-31 NOTE — PROGRESS NOTES
Pt admitted to room 315 from ER via cart. Staff transferred pt from cart to bed using slider sheet. Pt is on 2L nasal cannula due to receiving morphine in ER for abdominal pain. Stats at 97%, writer removed oxygen to assess SPO2 on room air, oxygen decreased to 87% while pt was resting. Applied nasal cannula at 1L and stats increased to 95%. Oxygen remains at 1L. Pt is alert and oriented but does have expressive aphasia and is easily frustrated when unable to find the correct words. Admission navigator and assessment completed and charted in flow sheets. Pt is lying on her right side requesting pain medication for pain at 8/10 to lower abdomen. Writer explained that I will get her pain medication once pharmacy verifies medications. Pt accepting. Call light and bedside table in reach.

## 2019-03-31 NOTE — ED NOTES
Patient O2 saturation dropped to 85% after Morphine. Dr. Rico Conner notified and verbal order given for O2.         Mayra Ortiz RN  03/31/19 9486

## 2019-03-31 NOTE — CONSULTS
External Genitalia:  General appearance; normal, Hair distribution; normal, Lesions absent  No current vaginal bleeding present   NEUROLOGIC:  Awake, alert, oriented to name, place and time.   Hesitates with answering other than yes, no questions      DATA:  Results for orders placed or performed during the hospital encounter of 03/31/19   Comprehensive Metabolic Panel   Result Value Ref Range    Glucose 129 (H) 70 - 99 mg/dL    BUN 37 (H) 8 - 23 mg/dL    CREATININE 2.72 (H) 0.50 - 0.90 mg/dL    Bun/Cre Ratio 14 9 - 20    Calcium 8.9 8.6 - 10.4 mg/dL    Sodium 140 135 - 144 mmol/L    Potassium 4.3 3.7 - 5.3 mmol/L    Chloride 108 (H) 98 - 107 mmol/L    CO2 18 (L) 20 - 31 mmol/L    Anion Gap 14 9 - 17 mmol/L    Alkaline Phosphatase 81 35 - 104 U/L    ALT 12 5 - 33 U/L    AST 10 <32 U/L    Total Bilirubin <0.10 (L) 0.3 - 1.2 mg/dL    Total Protein 6.5 6.4 - 8.3 g/dL    Alb 3.4 (L) 3.5 - 5.2 g/dL    Albumin/Globulin Ratio 1.1 1.0 - 2.5    GFR Non- 18 (L) >60 mL/min    GFR  21 (L) >60 mL/min    GFR Comment          GFR Staging         CBC Auto Differential   Result Value Ref Range    WBC 8.0 3.5 - 11.3 k/uL    RBC 3.24 (L) 3.95 - 5.11 m/uL    Hemoglobin 9.4 (L) 11.9 - 15.1 g/dL    Hematocrit 31.7 (L) 36.3 - 47.1 %    MCV 97.8 82.6 - 102.9 fL    MCH 29.0 25.2 - 33.5 pg    MCHC 29.7 28.4 - 34.8 g/dL    RDW 13.5 11.8 - 14.4 %    Platelets See Reflexed IPF Result 138 - 453 k/uL    MPV NOT REPORTED 8.1 - 13.5 fL    NRBC Automated 0.0 0.0 per 100 WBC    Differential Type NOT REPORTED     WBC Morphology NOT REPORTED     RBC Morphology NOT REPORTED     Platelet Estimate NOT REPORTED     Seg Neutrophils 70 (H) 36 - 65 %    Lymphocytes 16 (L) 24 - 43 %    Monocytes 8 3 - 12 %    Eosinophils % 5 (H) 1 - 4 %    Basophils 1 0 - 2 %    Immature Granulocytes 1 (H) 0 %    Segs Absolute 5.60 1.50 - 8.10 k/uL    Absolute Lymph # 1.30 1.10 - 3.70 k/uL    Absolute Mono # 0.66 0.10 - 1.20 k/uL    Absolute Eos  No obstructing calculi identified. 4. Diverticulosis with no acute features.        ASSESSMENT : 70year old aphasic female with abdominal pain of unknown origin and abnormal endometrium by Ct scan  Patient Active Hospital Problem List:   Pelvic pain (3/31/2019)    Endometrial thickening      PLAN:  After collaboration with Dr. Marisel Hughes, will await pelvic sono as ordered for tomorrow and plan out patient follow up in office for pap smear and endometrial samplling

## 2019-03-31 NOTE — H&P
Patient:  Sarika Walker  MRN: [id number]    Chief Complaint:    Chief Complaint   Patient presents with    Abdominal Pain     lower abdomen, seen yesterday for same complaint    Urinary Retention     patient states has not urinated since Friday night       History Obtained From:  patient, electronic medical record    PCP: Liset Mata DO    History of Present Illness: The patient is a 70 y.o. female who presents with abdominal pain. ED for abdominal pain and urinary retention, beginning last few days ago. The complaint has been constant, moderate in severity, and worsened by nothing. Patient has history of CVA secondary to hemorrhage therefore has expressive aphasia and therefore difficult to understand what she is trying to articulate but is in obvious pain. Was evaluated in the emergency department and noted to have endometrial swelling and material and possible malignancy           Past Medical History:        Diagnosis Date    Cerebral aneurysm 1997    Chronic renal insufficiency     Intracranial hemorrhage ([facility] [address] 1997    Obesity     Respiratory failure, acute ([facility] [address] 1997    Seizures ([facility] [address]     no seizure since before 2002       Past Surgical History:        Procedure Laterality Date    CRANIOTOMY      microdissection    GASTROSTOMY TUBE PLACEMENT      TRACHEOSTOMY         Family History:   No family history on file. Social History:   TOBACCO:   reports that she has quit smoking. Her smoking use included cigarettes. She has a 15.00 pack-year smoking history. She has never used smokeless tobacco.  ETOH:   reports that she does not drink alcohol. Allergies:  Pcn [penicillins]    Medications Prior to Admission:    Prior to Admission medications    Medication Sig Start Date End Date Taking? Authorizing Provider   HYDROcodone-acetaminophen (NORCO) 5-325 MG per tablet Take 1 tablet by mouth every 6 hours as needed for Pain for up to 3 days. Intended supply: 3 days.  Take lowest dose possible to manage pain 3/29/19 4/1/19 Yes Phil Carcamo II, PA-C   carbamazepine (TEGRETOL XR) 100 MG SR tablet Take 100 mg by mouth 2 times daily. Yes Historical Provider, MD   Melatonin 3 MG TABS Take 3 mg by mouth nightly. Yes Historical Provider, MD   aspirin 81 MG EC tablet Take 81 mg by mouth daily. Yes Historical Provider, MD   metformin (GLUCOPHAGE) 500 MG tablet Take 500 mg by mouth daily (with breakfast). Yes Historical Provider, MD   vitamin D (CHOLECALCIFEROL) 83523 UNIT CAPS Take 1 capsule by mouth once a week for 8 doses And then one cap every month for the next 4 months 11/12/15 1/1/16  MD ANGELICA BerryADOPRINCESS ALK-PHENOBARBITAL PO Take  by mouth. Historical Provider, MD   enalapril (VASOTEC) 2.5 MG tablet Take 2.5 mg by mouth daily. Historical Provider, MD   hydrocodone-acetaminophen (VICODIN) 5-500 MG per tablet Take 1 tablet by mouth every 6 hours as needed. Historical Provider, MD       Review of Systems:  Constitutional:negative  for fevers, and negative for chills. Eyes: negative for visual disturbance   ENT: negative for sore throat, negative nasal congestion, and negative for earache  Respiratory: negative for shortness of breath, negative for cough, and negative for wheezing  Cardiovascular: negative for chest pain, negative for palpitations, and negative for syncope  Gastrointestinal: positive for abdominal pain, positive for nausea,negative for vomiting, negative for diarrhea, negative for constipation, and negative for hematochezia or melena  Genitourinary: negative for dysuria, negative for urinary urgency, negative for urinary frequency, and negative for hematuria  Skin: negative for skin rash, and negative for skin lesions  Neurological: negative for unilateral weakness, numbness or tingling.     Physical Exam:    Vitals:   Temp: 97.5 °F (36.4 °C)  BP: (!) 142/66  Resp: 16  Pulse: 54  SpO2: 95 %  24HR INTAKE/OUTPUT:      Intake/Output Summary (Last 24 hours) at hypertension 10/28/2015    Hemorrhagic cerebrovascular accident (CVA) ([facility] [address] 10/28/2015    Cholelithiasis 04/12/2012       Plan:     · This patient requires overnight observation because of pelvic pain  · Factors affecting the medical complexity of this patient include Active Problems:  ·   Pelvic pain  · Resolved Problems:  ·   * No resolved hospital problems. *  ·   · Estimated length of stay is 1 days  · Pain meds  · OB / GYN consul  · High risk medication monitoring: zero    CORE MEASURES  Core measures including DVT prophylaxis, Code Status, Nutrition, Therapy Options, chart reviewed and advance directives reviewed at this visit.     Ina Lacy MD  3/31/2019, 10:22 AM

## 2019-03-31 NOTE — PROGRESS NOTES
Writer contacted Boo Summers via cell phone to make aware of consult. Boo Summers will be in later today when she makes rounds.

## 2019-03-31 NOTE — PLAN OF CARE
Problem: Risk for Impaired Skin Integrity  Goal: Tissue integrity - skin and mucous membranes  Description  Structural intactness and normal physiological function of skin and  mucous membranes. Outcome: Ongoing  Note:   Mucous membranes pink and moist. No redness or open areas to skin. Will continue to monitor. Problem: Falls - Risk of:  Goal: Will remain free from falls  Description  Will remain free from falls  Outcome: Ongoing  Note:   Bed in lowest position. Wheels locked. Call light in reach. 2/4 siderails up. Bed alarm on. Problem: Pain:  Goal: Pain level will decrease  Description  Pain level will decrease  Outcome: Ongoing  Note:   Patient is able to verbalize pain. Patient stated no pain on 0-10 pain scale. Will continue to monitor.

## 2019-03-31 NOTE — ED PROVIDER NOTES
140 135 - 144 mmol/L    Potassium 4.3 3.7 - 5.3 mmol/L    Chloride 108 (H) 98 - 107 mmol/L    CO2 18 (L) 20 - 31 mmol/L    Anion Gap 14 9 - 17 mmol/L    Alkaline Phosphatase 81 35 - 104 U/L    ALT 12 5 - 33 U/L    AST 10 <32 U/L    Total Bilirubin <0.10 (L) 0.3 - 1.2 mg/dL    Total Protein 6.5 6.4 - 8.3 g/dL    Alb 3.4 (L) 3.5 - 5.2 g/dL    Albumin/Globulin Ratio 1.1 1.0 - 2.5    GFR Non- 18 (L) >60 mL/min    GFR  21 (L) >60 mL/min    GFR Comment          GFR Staging         CBC Auto Differential   Result Value Ref Range    WBC 8.0 3.5 - 11.3 k/uL    RBC 3.24 (L) 3.95 - 5.11 m/uL    Hemoglobin 9.4 (L) 11.9 - 15.1 g/dL    Hematocrit 31.7 (L) 36.3 - 47.1 %    MCV 97.8 82.6 - 102.9 fL    MCH 29.0 25.2 - 33.5 pg    MCHC 29.7 28.4 - 34.8 g/dL    RDW 13.5 11.8 - 14.4 %    Platelets See Reflexed IPF Result 138 - 453 k/uL    MPV NOT REPORTED 8.1 - 13.5 fL    NRBC Automated 0.0 0.0 per 100 WBC    Differential Type NOT REPORTED     WBC Morphology NOT REPORTED     RBC Morphology NOT REPORTED     Platelet Estimate NOT REPORTED     Seg Neutrophils 70 (H) 36 - 65 %    Lymphocytes 16 (L) 24 - 43 %    Monocytes 8 3 - 12 %    Eosinophils % 5 (H) 1 - 4 %    Basophils 1 0 - 2 %    Immature Granulocytes 1 (H) 0 %    Segs Absolute 5.60 1.50 - 8.10 k/uL    Absolute Lymph # 1.30 1.10 - 3.70 k/uL    Absolute Mono # 0.66 0.10 - 1.20 k/uL    Absolute Eos # 0.39 0.00 - 0.44 k/uL    Basophils # 0.05 0.00 - 0.20 k/uL    Absolute Immature Granulocyte 0.04 0.00 - 0.30 k/uL   Lipase   Result Value Ref Range    Lipase 27 13 - 60 U/L   Urinalysis   Result Value Ref Range    Color, UA YELLOW YELLOW    Turbidity UA CLEAR CLEAR    Glucose, Ur NEGATIVE NEGATIVE    Bilirubin Urine NEGATIVE NEGATIVE    Ketones, Urine NEGATIVE NEGATIVE    Specific Gravity, UA 1.015 1.010 - 1.020    Urine Hgb NEGATIVE NEGATIVE    pH, UA 5.5 5.0 - 9.0    Protein, UA 1+ (A) NEGATIVE    Urobilinogen, Urine Normal Normal    Nitrite, Urine NEGATIVE NEGATIVE    Leukocyte Esterase, Urine NEGATIVE NEGATIVE    Urinalysis Comments NOT REPORTED    Lactic Acid, Plasma   Result Value Ref Range    Lactic Acid 0.6 0.5 - 2.2 mmol/L    Lactic Acid, Whole Blood NOT REPORTED 0.7 - 2.1 mmol/L   Microscopic Urinalysis   Result Value Ref Range    -          WBC, UA 0 TO 2 0 - 5 /HPF    RBC, UA None 0 - 2 /HPF    Casts UA NOT REPORTED /LPF    Crystals UA NOT REPORTED None /HPF    Epithelial Cells UA 0 TO 2 0 - 25 /HPF    Renal Epithelial, Urine NOT REPORTED 0 /HPF    Bacteria, UA NOT REPORTED None    Mucus, UA NOT REPORTED None    Trichomonas, UA NOT REPORTED None    Amorphous, UA NOT REPORTED None    Other Observations UA NOT REPORTED NOT REQ. Yeast, UA NOT REPORTED None   Immature Platelet Fraction   Result Value Ref Range    Platelet, Immature Fraction 3.3 1.1 - 10.3 %    Platelet, Fluorescence 125 (L) 138 - 453 k/uL       RADIOLOGY:  Interpreted by Radiologist.  [address]    (Results Pending)       ------------------------- NURSING NOTES AND VITALS REVIEWED ---------------------------   The nursing notes within the ED encounter and vital signs as below have been reviewed. /67   Pulse 55   Temp 96.7 °F (35.9 °C) (Tympanic)   Resp 16   SpO2 96%   Oxygen Saturation Interpretation: Normal      ---------------------------------------------------PHYSICAL EXAM--------------------------------------      Constitutional/General: Alert and oriented x3, well appearing, non toxic in moderate distress secondary to pain  Head: NC/AT  Eyes: PERRL, EOMI  Mouth: Oropharynx clear, handling secretions, no trismus  Neck: Supple, full ROM, no meningeal signs  Pulmonary: Lungs clear to auscultation bilaterally, no wheezes, rales, or rhonchi. Not in respiratory distress  Cardiovascular:  Regular rate and rhythm, no murmurs, gallops, or rubs.  2+ distal pulses  Abdomen: Soft, moderate tenderness to palpation and patient is very obese therefore difficult to assess, non distended,   Extremities: Moves all extremities x 4. Warm and well perfused  Skin: warm and dry without rash  Neurologic: GCS 15,  Psych: Normal Affect/tearful and crying out in pain      ------------------------------ ED COURSE/MEDICAL DECISION MAKING----------------------  Medications   0.9 % sodium chloride bolus (0 mLs Intravenous Stopped 3/31/19 0612)   ketorolac (TORADOL) injection 15 mg (15 mg Intravenous Given 3/31/19 0508)   morphine injection 4 mg (4 mg Intravenous Given 3/31/19 0508)   ondansetron (ZOFRAN) injection 4 mg (4 mg Intravenous Given 3/31/19 0508)         Medical Decision Making:    We'll place Clements catheter and obtain laboratory evaluation and treatment pain and then reevaluate    Counseling: The emergency provider has spoken with the patient and discussed todays results, in addition to providing specific details for the plan of care and counseling regarding the diagnosis and prognosis. Questions are answered at this time and they are agreeable with the plan.      --------------------------------- IMPRESSION AND DISPOSITION ---------------------------------    IMPRESSION  1. Endometrial disorder Stable   2. Abdominal pain, unspecified abdominal location Stable   3.  Acute renal failure superimposed on chronic kidney disease, unspecified CKD stage, unspecified acute renal failure type ([facility] Stable       DISPOSITION  Disposition: Admit to med/surg floor by Dr. Jay Orta  Patient condition is stable                  Monika Nogueira MD  03/31/19 7356       Monika Nogueira MD  03/31/19 0342

## 2019-04-01 ENCOUNTER — APPOINTMENT (OUTPATIENT)
Dept: NON INVASIVE DIAGNOSTICS | Age: 64
DRG: 746 | End: 2019-04-01
Payer: MEDICARE

## 2019-04-01 ENCOUNTER — APPOINTMENT (OUTPATIENT)
Dept: GENERAL RADIOLOGY | Age: 64
DRG: 746 | End: 2019-04-01
Payer: MEDICARE

## 2019-04-01 ENCOUNTER — APPOINTMENT (OUTPATIENT)
Dept: ULTRASOUND IMAGING | Age: 64
DRG: 746 | End: 2019-04-01
Payer: MEDICARE

## 2019-04-01 ENCOUNTER — APPOINTMENT (OUTPATIENT)
Dept: VASCULAR LAB | Age: 64
DRG: 746 | End: 2019-04-01
Payer: MEDICARE

## 2019-04-01 PROBLEM — R33.9 URINARY RETENTION: Status: ACTIVE | Noted: 2019-04-01

## 2019-04-01 PROBLEM — R09.02 HYPOXIA: Status: ACTIVE | Noted: 2019-04-01

## 2019-04-01 PROBLEM — N18.4 CKD (CHRONIC KIDNEY DISEASE) STAGE 4, GFR 15-29 ML/MIN (HCC): Status: ACTIVE | Noted: 2019-04-01

## 2019-04-01 LAB
GLUCOSE BLD-MCNC: 121 MG/DL (ref 74–100)
GLUCOSE BLD-MCNC: 127 MG/DL (ref 74–100)
LV EF: 60 %
LVEF MODALITY: NORMAL

## 2019-04-01 PROCEDURE — 96375 TX/PRO/DX INJ NEW DRUG ADDON: CPT

## 2019-04-01 PROCEDURE — G0378 HOSPITAL OBSERVATION PER HR: HCPCS

## 2019-04-01 PROCEDURE — 6370000000 HC RX 637 (ALT 250 FOR IP): Performed by: INTERNAL MEDICINE

## 2019-04-01 PROCEDURE — 94664 DEMO&/EVAL PT USE INHALER: CPT

## 2019-04-01 PROCEDURE — 93970 EXTREMITY STUDY: CPT

## 2019-04-01 PROCEDURE — C8929 TTE W OR WO FOL WCON,DOPPLER: HCPCS

## 2019-04-01 PROCEDURE — 6370000000 HC RX 637 (ALT 250 FOR IP): Performed by: PHYSICIAN ASSISTANT

## 2019-04-01 PROCEDURE — 97116 GAIT TRAINING THERAPY: CPT

## 2019-04-01 PROCEDURE — 71046 X-RAY EXAM CHEST 2 VIEWS: CPT

## 2019-04-01 PROCEDURE — 94760 N-INVAS EAR/PLS OXIMETRY 1: CPT

## 2019-04-01 PROCEDURE — 2580000003 HC RX 258: Performed by: INTERNAL MEDICINE

## 2019-04-01 PROCEDURE — 6360000002 HC RX W HCPCS: Performed by: PHYSICIAN ASSISTANT

## 2019-04-01 PROCEDURE — 97110 THERAPEUTIC EXERCISES: CPT

## 2019-04-01 PROCEDURE — 6360000004 HC RX CONTRAST MEDICATION: Performed by: FAMILY MEDICINE

## 2019-04-01 PROCEDURE — 94640 AIRWAY INHALATION TREATMENT: CPT

## 2019-04-01 PROCEDURE — 97162 PT EVAL MOD COMPLEX 30 MIN: CPT

## 2019-04-01 PROCEDURE — 82947 ASSAY GLUCOSE BLOOD QUANT: CPT

## 2019-04-01 PROCEDURE — 76856 US EXAM PELVIC COMPLETE: CPT

## 2019-04-01 PROCEDURE — 96372 THER/PROPH/DIAG INJ SC/IM: CPT

## 2019-04-01 PROCEDURE — 97530 THERAPEUTIC ACTIVITIES: CPT

## 2019-04-01 RX ORDER — FUROSEMIDE 10 MG/ML
20 INJECTION INTRAMUSCULAR; INTRAVENOUS ONCE
Status: COMPLETED | OUTPATIENT
Start: 2019-04-01 | End: 2019-04-01

## 2019-04-01 RX ORDER — IPRATROPIUM BROMIDE AND ALBUTEROL SULFATE 2.5; .5 MG/3ML; MG/3ML
1 SOLUTION RESPIRATORY (INHALATION) 3 TIMES DAILY PRN
Status: DISCONTINUED | OUTPATIENT
Start: 2019-04-01 | End: 2019-04-05 | Stop reason: HOSPADM

## 2019-04-01 RX ORDER — IPRATROPIUM BROMIDE AND ALBUTEROL SULFATE 2.5; .5 MG/3ML; MG/3ML
1 SOLUTION RESPIRATORY (INHALATION)
Status: DISCONTINUED | OUTPATIENT
Start: 2019-04-01 | End: 2019-04-01

## 2019-04-01 RX ORDER — PREDNISONE 20 MG/1
20 TABLET ORAL DAILY
Status: DISCONTINUED | OUTPATIENT
Start: 2019-04-01 | End: 2019-04-02

## 2019-04-01 RX ORDER — DEXTROSE MONOHYDRATE 50 MG/ML
100 INJECTION, SOLUTION INTRAVENOUS PRN
Status: DISCONTINUED | OUTPATIENT
Start: 2019-04-01 | End: 2019-04-05 | Stop reason: HOSPADM

## 2019-04-01 RX ORDER — CARBAMAZEPINE 100 MG/1
100 TABLET, EXTENDED RELEASE ORAL 2 TIMES DAILY
Status: DISCONTINUED | OUTPATIENT
Start: 2019-04-01 | End: 2019-04-03

## 2019-04-01 RX ORDER — DEXTROSE MONOHYDRATE 25 G/50ML
12.5 INJECTION, SOLUTION INTRAVENOUS PRN
Status: DISCONTINUED | OUTPATIENT
Start: 2019-04-01 | End: 2019-04-05 | Stop reason: HOSPADM

## 2019-04-01 RX ORDER — NICOTINE POLACRILEX 4 MG
15 LOZENGE BUCCAL PRN
Status: DISCONTINUED | OUTPATIENT
Start: 2019-04-01 | End: 2019-04-05 | Stop reason: HOSPADM

## 2019-04-01 RX ORDER — HEPARIN SODIUM 5000 [USP'U]/ML
5000 INJECTION, SOLUTION INTRAVENOUS; SUBCUTANEOUS EVERY 8 HOURS SCHEDULED
Status: DISCONTINUED | OUTPATIENT
Start: 2019-04-01 | End: 2019-04-02

## 2019-04-01 RX ADMIN — FUROSEMIDE 20 MG: 10 INJECTION, SOLUTION INTRAVENOUS at 13:03

## 2019-04-01 RX ADMIN — CARBAMAZEPINE 100 MG: 100 TABLET, EXTENDED RELEASE ORAL at 19:22

## 2019-04-01 RX ADMIN — HEPARIN SODIUM 5000 UNITS: 5000 INJECTION INTRAVENOUS; SUBCUTANEOUS at 21:47

## 2019-04-01 RX ADMIN — HEPARIN SODIUM 5000 UNITS: 5000 INJECTION INTRAVENOUS; SUBCUTANEOUS at 13:04

## 2019-04-01 RX ADMIN — ASPIRIN 81 MG: 81 TABLET, COATED ORAL at 09:36

## 2019-04-01 RX ADMIN — IPRATROPIUM BROMIDE AND ALBUTEROL SULFATE 1 AMPULE: .5; 3 SOLUTION RESPIRATORY (INHALATION) at 10:37

## 2019-04-01 RX ADMIN — PREDNISONE 20 MG: 20 TABLET ORAL at 14:44

## 2019-04-01 RX ADMIN — Medication 10 ML: at 21:48

## 2019-04-01 RX ADMIN — PERFLUTREN 2.2 MG: 6.52 INJECTION, SUSPENSION INTRAVENOUS at 14:00

## 2019-04-01 RX ADMIN — Medication 10 ML: at 13:03

## 2019-04-01 RX ADMIN — POLYETHYLENE GLYCOL (3350) 17 G: 17 POWDER, FOR SOLUTION ORAL at 13:03

## 2019-04-01 RX ADMIN — OXYCODONE AND ACETAMINOPHEN 1 TABLET: 10; 325 TABLET ORAL at 07:49

## 2019-04-01 RX ADMIN — Medication 10 ML: at 09:37

## 2019-04-01 RX ADMIN — Medication 3 MG: at 21:47

## 2019-04-01 ASSESSMENT — PAIN DESCRIPTION - LOCATION
LOCATION: PELVIS
LOCATION: PELVIS

## 2019-04-01 ASSESSMENT — PAIN SCALES - GENERAL
PAINLEVEL_OUTOF10: 10
PAINLEVEL_OUTOF10: 0
PAINLEVEL_OUTOF10: 10
PAINLEVEL_OUTOF10: 0
PAINLEVEL_OUTOF10: 0

## 2019-04-01 ASSESSMENT — PAIN DESCRIPTION - DESCRIPTORS
DESCRIPTORS: BURNING;PRESSURE
DESCRIPTORS: BURNING;PRESSURE

## 2019-04-01 ASSESSMENT — PAIN DESCRIPTION - PAIN TYPE
TYPE: ACUTE PAIN
TYPE: ACUTE PAIN

## 2019-04-01 ASSESSMENT — PAIN DESCRIPTION - ORIENTATION
ORIENTATION: LOWER;MID
ORIENTATION: LOWER;MID

## 2019-04-01 NOTE — PROGRESS NOTES
Nutrition Assessment    Type and Reason for Visit: Initial    Nutrition Recommendations:  Encourage healthy eating    Nutrition Assessment: Predicted suboptimal energy intakes r/t cognitive deficit, AEB hx cva with particular food likes and dislikes. Aphasia, without dysphagia reported. Will have dietary staff visit to obtain preferences. Daughter present and speaks for patient a lot. Malnutrition Assessment:  · Malnutrition Status: At risk for malnutrition  · Context: Acute illness or injury  · Findings of the 6 clinical characteristics of malnutrition (Minimum of 2 out of 6 clinical characteristics is required to make the diagnosis of moderate or severe Protein Calorie Malnutrition based on AND/ASPEN Guidelines):  1. Energy Intake-Less than or equal to 50% of estimated energy requirement, (just acutely)    2. Weight Loss-No significant weight loss,    3. Fat Loss-No significant subcutaneous fat loss,    4. Muscle Loss-No significant muscle mass loss,    5. Fluid Accumulation-No significant fluid accumulation,    6.   Strength-Not measured    Nutrition Risk Level: Low, Moderate    Nutrition Diagnosis:   · Problem: Predicted suboptimal energy intake  · Etiology: related to Cognitive or neurological impairment     Signs and symptoms:  as evidenced by Patient report of(prior cva and particular about food ingested )    Objective Information:  · Nutrition-Focused Physical Findings: obese  · Wound Type: None  · Current Nutrition Therapies:  · Oral Diet Orders: No Added Salt (3-4gm)   · Oral Diet intake: 0%  · Oral Nutrition Supplement (ONS) Orders: None  · Anthropometric Measures:  · Ht: 5' 4\" (162.6 cm)   · Current Body Wt: 231 lb 4 oz (104.9 kg)  · Admission Body Wt: 231 lb 2 oz (104.8 kg)  · Usual Body Wt: (unknown)  · % Weight Change:  ,  unknown  · Ideal Body Wt: 120 lb (54.4 kg), % Ideal Body 193%  · BMI Classification: BMI 35.0 - 39.9 Obese Class II    Lab Results   Component Value Date

## 2019-04-01 NOTE — FLOWSHEET NOTE
Patient states she cant take the pain of not urinating. Clements clamped for pelvic US at 0900. US called . Will try to get patient in around 0830. Percocet 1 p o given for c/o pelvic pain.

## 2019-04-01 NOTE — PROGRESS NOTES
Physical Therapy  Facility/Department: [facility] AT THE [facility] MED SURG  Daily Treatment Note  NAME: Itz Jackson  : 1955  MRN: [id number]    Date of Service: 2019    Discharge Recommendations:  Home with assist PRN        Patient Diagnosis(es): The primary encounter diagnosis was Endometrial disorder. Diagnoses of Abdominal pain, unspecified abdominal location and Acute renal failure superimposed on chronic kidney disease, unspecified CKD stage, unspecified acute renal failure type [facility]) were also pertinent to this visit. has a past medical history of Cerebral aneurysm, Chronic renal insufficiency, Intracranial hemorrhage ([facility] Obesity, Respiratory failure, acute ([facility] and Seizures ([facility] [address] has a past surgical history that includes tracheostomy; Gastrostomy tube placement; and craniotomy. Restrictions  Restrictions/Precautions  Restrictions/Precautions: General Precautions, Fall Risk  Subjective   General  Chart Reviewed: Yes  Response To Previous Treatment: Patient with no complaints from previous session. Family / Caregiver Present: Yes  Subjective  Subjective: Pt denies pain upon arrival.  Pt reports feeling very cold and tired upon arrival.  Pain Screening  Patient Currently in Pain: No  Vital Signs  Patient Currently in Pain: No       Orientation  Orientation  Overall Orientation Status: Within Functional Limits  Cognition      Objective   Bed mobility  Rolling to Left: Stand by assistance  Sit to Supine: Stand by assistance  Scooting: Stand by assistance  Transfers  Sit to Stand: Supervision  Stand to sit: Supervision  Ambulation  Ambulation?: Yes  Ambulation 1  Surface: level tile  Device: No Device  Other Apparatus: O2  Assistance: Stand by assistance  Quality of Gait: Waddle gait  Distance: 30 ft  Comments: Pt amb with no AD and SBA - no LOB noted.          Exercises  Straight Leg Raise: x15  Quad Sets: x15  Heelslides: x15  Hip Flexion: x15  Hip Abduction: x15  Ankle Pumps: x15  Comments: Above ther ex completed in supine and sitting. Visual cues for correct technique. Patient Education: Pt educated on safe hand/trunk placement with sit<>stand with pt verbalizing understanding. REQUIRES PT FOLLOW UP: Yes  Activity Tolerance  Activity Tolerance: Patient Tolerated treatment well       Goals  Short term goals  Time Frame for Short term goals: 10 days  Short term goal 1: Pt to ambulate 150ft without AD with no LOB. Short term goal 2: Pt to demonstrate good standing balance for decrease fall risk. Short term goal 3: Pt to complete all bed mob, transfers, and gait independently. Short term goal 4: Pt to tolerate 20-30 mins ther ex/act for improved strength and enduance with ADL's. Patient Goals   Patient goals : Home following discharge    Plan    Plan  Times per week: 7 x week  Times per day: Twice a day  Current Treatment Recommendations: Strengthening, Gait Training, Patient/Caregiver Education & Training, IADL Training, Stair training, Balance Training, Neuromuscular Re-education, Functional Mobility Training, Endurance Training, Home Exercise Program, Transfer Training  Safety Devices  Type of devices:  All fall risk precautions in place, Left in bed     Therapy Time   Individual Concurrent Group Co-treatment   Time In 1426         Time Out 1450         Minutes 210 [facility]

## 2019-04-01 NOTE — PROGRESS NOTES
Writer entered room to get patients home supply of Tegretol for the supervisor to check. Daughter stated they just gave it to her. Writer explained to family and patient that they cannot take medications unless we give them to her. Patient also had medications she was hiding in her room without staff knowing. Daughter told Rita Ledesma and gave the medications to staff to be locked in cabinet.

## 2019-04-01 NOTE — PROGRESS NOTES
Hospitalist Progress Note    SUBJECTIVE/INTERVAL HISTORY:    Patient seen in follow up for CKD 4, hypoxia, former smoker, urinary retention, pelvic pain, seizure order, former CVA with residual expressive aphagia. Patient SaO2 dropped. On 4L NC now. Wheezing. No h/o CHF. Vascular congestion on CXR. XR CHEST STANDARD (2 VW) [[id number]] Collected: 04/01/19 1123   Updated: 04/01/19 1131    Specimen Type: Chest    Narrative:     EXAMINATION:  TWO VIEWS OF THE CHEST    4/1/2019 10:21 am    COMPARISON:  05/30/2017    HISTORY:  ORDERING SYSTEM PROVIDED HISTORY: hypoxia  TECHNOLOGIST PROVIDED HISTORY:  hypoxia    FINDINGS:  Cardiac silhouette is normal in size.  Dense atherosclerosis of the aortic  arch.  Right hemithorax is clear.  Small left pleural effusion adjacent  airspace disease/atelectasis.  No evidence for pneumothorax.  Trachea is  midline. Impression:     Borderline cardiomegaly and mild central vascular congestion. Small left pleural effusion and adjacent atelectasis. US PELVIS COMPLETE [[id number]] Collected: 04/01/19 7358   Updated: 04/01/19 0936    Narrative:     EXAMINATION:  PELVIC ULTRASOUND    4/1/2019    TECHNIQUE:  Transabdominal pelvic ultrasound was performed.  Patient refused endovaginal  exam.    COMPARISON:  CT abdomen and pelvis dated 03/29/2019    HISTORY:  ORDERING SYSTEM PROVIDED HISTORY: Pelvic pain/abnormal CT scan of pelvis    Abnormal CT appearance of the uterus.  Pelvic pain. FINDINGS:    Measurements:    Uterus:  16.9 x 6.5 x 4.7 cm    Endometrial stripe:  4.4 cm, although limited    Right Ovary:  Not measured    Left Ovary:  Not measured      Ultrasound Findings:    Uterus: Visibility is limited due to transabdominal imaging and nondistended  urinary bladder due to Clements catheterization.  Body habitus also limits  evaluation.  The uterus is bulky in size.  Myometrial echotexture is  heterogeneous.  The endometrial/myometrial interface is ill-defined.     Endometrial stripe: lesions  Neurological: negative for unilateral weakness, numbness or tingling. Exam:  GEN:  alert and oriented to person, place and time, well-developed and well-nourished, in no acute distress  EYES: PERRL  NECK: normal, supple  PULM: Diminished bilaterally - exp wheezes, faint rales, no rhonchi, no respiratory distress, on O2  COR: regular rate & rhythm and no murmurs  ABD:  Obese, soft, non-tender, non-distended, normal bowel sounds, no masses or organomegaly  EXT:   edema: 1+ affecting bilateral foot, bilateral ankle  NEURO: follows commands, POLANCO, expressive aphagia  SKIN:  no rashes or significant lesions      -----------------------------------------------------------------  Diagnostic Data:  Lab Results   Component Value Date    WBC 8.0 03/31/2019    HGB 9.4 (L) 03/31/2019    HCT 31.7 (L) 03/31/2019    PLT See Reflexed IPF Result 03/31/2019    ALT 12 03/31/2019    AST 10 03/31/2019     03/31/2019    K 4.3 03/31/2019     (H) 03/31/2019    CREATININE 2.72 (H) 03/31/2019    BUN 37 (H) 03/31/2019    CO2 18 (L) 03/31/2019    INR 1.0 05/13/2017    LABA1C 5.7 01/19/2017       ASSESSMENT:    Principal Problem:    Pelvic pain  Active Problems:    Obesity    Seizures (HCC)    Chronic renal insufficiency    Cerebral aneurysm    Urinary retention    Hypoxia    CKD (chronic kidney disease) stage 4, GFR 15-29 ml/min ([facility])  Resolved Problems:    * No resolved hospital problems.  *      Patient Active Problem List    Diagnosis Date Noted    Urinary retention 04/01/2019    Hypoxia 04/01/2019    CKD (chronic kidney disease) stage 4, GFR 15-29 ml/min (HCC) 04/01/2019    Obesity     Seizures (HCC)     Chronic renal insufficiency     Pelvic pain 03/31/2019    CKD (chronic kidney disease) stage 3, GFR 30-59 ml/min ([facility] [address] 10/28/2015    Essential hypertension 10/28/2015    [facility] 10/28/2015    Cholelithiasis 04/12/2012    Cerebral aneurysm 01/01/1997       PLAN: Pelvic pain    Spoke with Joana Snider midwife    Needs bx as outpatient based on pelvis U/S    Obesity    Seizures    Cerebral aneurysm   Residual aphasia    Urinary retention   Will d/c jay prior to dishcarge    DM   SSI    Hypoxia   Wean O2   Steroid   Nebs   IS   PT   BLE doppler   SQ heparin   ECHO    CKDstage 4, GFR 15-29 ml/min  Resolved Problems:    * No resolved hospital problems.  *      · High risk medication: none    Keep today    Peter Carter PA-C  4/1/2019, 12:38 PM

## 2019-04-01 NOTE — FLOWSHEET NOTE
Mirralax po given for c/o constipation. Patients daughter in room. Informed daughter that her mother would not take her Metformin and Tegretol po this morning due to medication being generic brand.  Asked daughter to bring patients own medication in

## 2019-04-01 NOTE — PROGRESS NOTES
Discussed discharge plans with the patient and friend. Patient is a 70year old female here with Pelvic pain. She is alert and oriented. Patient has difficulty saying what she means due to a past stroke. Patient is  and lives at home with her . She uses no medical equipment. Patient uses frozen meals and does the cleaning. She has a friend that provides the transportation to appointments. Patient manages her own medication. Her  goes to dialysis and goes by [facility]. Her PCP is Dr. Blaise Portillo. She has medical insurance that helps with medication costs. The discharge plan is home with no services at this time. Tried to call the  to talk about dc plans. Someone answered the phone but did not talk. Called the second  which is a friend that helps  Get the patient to her appointments and shopping. Explain to her about unable to talk with the . She stated she would call the daughter which we have no phone # for. Received a phone call from the daughter that which ask that we call her before friend. Return call and left message to the daughter explain that she was not listed but that she has been added as a  now. CARLAW to monitor and assist with any needs and concerns as they arise.     RUBIO Melissa

## 2019-04-01 NOTE — FLOWSHEET NOTE
Refused Metformin and Tegretol this morning because a they didn't look like her home medications. Luis Alberto GRAY made aware of patients O2 sats dropping down to 82 % on room air. And O2 at 2 L applied.

## 2019-04-01 NOTE — FLOWSHEET NOTE
Patient to have a renal ultrasound at 0900 this morning. Clements cath clamped. Patient given 32 oz. Of water to drink.  NPO

## 2019-04-01 NOTE — PROGRESS NOTES
RESPIRATORY ASSESSMENT PROTOCOL                                                                                              Patient Name: Bridgette Simon Room#: 8737/6962-00 : 1955     Admitting diagnosis: Pelvic pain [R10.2]  Pelvic pain [R10.2]       Medical History:   Past Medical History:   Diagnosis Date    Cerebral aneurysm     Chronic renal insufficiency     [email or web address]     Obesity     Respiratory failure, acute ([facility] [facility]     Seizures ([email or web address]     no seizure since before        [address]  Hematology:   Lab Results   Component Value Date    WBC 8.0 2019    RBC 3.24 2019    RBC 4.06 2012    HGB 9.4 2019    HCT 31.7 2019    PLT See Reflexed IPF Result 2019     2012     Chemistry:  No results found for: PHART, FSZ1BNW, PO2ART, V2YDHORY, BEF3FJY, PBEA    Blood Culture:   Sputum Culture:     VITALS  Pulse: 57   Resp: 16  BP: (!) 106/46  SpO2: 95 % O2 Device: Nasal cannula  Temp: 98.9 °F (37.2 °C)  Comment:   SKIN COLOR  [] Normal  [] Pale  [] Dusky  [] Cyanotic      RESPIRATORY PATTERN  [] Normal  [] Dyspnea  [] Cheyne-Brody  [] Kussmaul  [] Biots  AMBULATORY  [] Yes  [] No  [] With Assistance    PEAK FLOW  Predicted:     Personal Best:        VITAL CAPACITY  Predicted value:  ml  Actual Value:  ml  30% of Predicted:  ml  Patient Acuity 0 1 2 3 4 Score   Level of Concious (LOC) [x]  Alert & Oriented or Pt normal LOC []  Confused;follows directions []  Confused & uncooper-ative []  Obtunded []  Comatose 0   Respiratory Rate  (RR) [x]  Reg. rate & pattern. 12 - 20 bpm  []  Increased RR.  Greater than 20 bpm   []  SOB w/ exertion or RR greater than 24 bpm []  Access- ory muscle use at rest. Abn.  resp. []  SOB at rest.   0   Bilateral Breath Sounds (BBS) [x]  Clear []  Diminish-ed bases  []  Diminish-ed t/o, or rales   []  Sporadic, scattered wheezes or rhonchi []  Persistentwheezes and, or absent BBS THERAPY with  [physician-ordered bronchodilator(s)]  QID and Albuterol PRN q4 hrs. Breath-Actuated Neb if BBS Acuity = 4, and pt. can use MP. Notify physician if condition deteriorates. MDI THERAPY with  2 actuations of [physician-ordered bronchodilator(s)] via spacer TID Albuterol and PRNq4 hrs. If unable to utilize MDI: HHN [physician-ordered bronchodilator(s)] TID and Albuterol PRN q4 hrs. Notify physician if condition deteriorates. MDI THERAPY with  [physician-ordered bronchodilator(s)] via spacer TID PRN. If unable to utilize MDI: HHN [physician-ordered bronchodilator(s)] TID PRN. Notify physician if condition deteriorates. If Acuity Level is 2, 3, or 4 in any of the following:    [] COUGH     [] SURGICAL HISTORY (SURG HX)  [] CHEST XRAY (CXR)    Goal: Improvement in sputum mobilization in patients with ineffective airway clearance. Reverse atelectasis. [] Bronchopulmonary Hygiene Protocol    Total Acuity:   16-32  []  Secondary Assessment in 24 hrs Total Acuity:  9-15  []  Secondary Assessment in 24 hrs Total Acuity:  4-8  []  Secondary Assessment in 48 hrs Total Acuity:  0-3  [x]  Secondary Assessment in 72 hrs   METANEB QID with [physician-ordered bronchodilator(s)] if CXR Acuity = 4; otherwise:  PD&P, PEP, or Vest QID & PRN  NT Sxn PRN for ineffective cough  METANEB QID with [physician-ordered bronchodilator(s)] if CXR Acuity = 4; otherwise:  PD&P, PEP, or Vest TID & PRN  NT Sxn PRN for ineffective cough  Instruct patient to self-perform IS q1hr WA   Directed Cough self-performed q1hr WA     If Acuity Level is 2 or above in the following:    [] PULMONARY HISTORY (PULM HX)    Goal: Assist patient in quitting smoking to slow or stop the progression of lung disease.     [] Smoking Cessation Protocol    SMOKING CESSATION EDUCATION provided according to policy [id number]: (belkis with an X)  ____Yes    ____ No     ____ NA    Smoking Cessation Booklet given:  ____Yes  ____No ____Patient Raphael Dach

## 2019-04-01 NOTE — PROGRESS NOTES
Physical Therapy    Facility/Department: [facility] AT THE [facility] MED SURG  Initial Assessment    NAME: Davis Morales  : 1955  MRN: [id number]    Date of Service: 2019    Discharge Recommendations:  Home with assist PRN        Assessment   Assessment: Pt is 71 y/o female with general weakness and decreased O2 stats with functional mobility; will benefit from PT during hospital stay. Treatment Diagnosis: general weakness  Prognosis: Good  Decision Making: Medium Complexity  Patient Education: PT eval and POC  REQUIRES PT FOLLOW UP: Yes  Activity Tolerance  Activity Tolerance: Patient Tolerated treatment well       Patient Diagnosis(es): The primary encounter diagnosis was Endometrial disorder. Diagnoses of Abdominal pain, unspecified abdominal location and Acute renal failure superimposed on chronic kidney disease, unspecified CKD stage, unspecified acute renal failure type [facility]) were also pertinent to this visit. has a past medical history of Cerebral aneurysm, Chronic renal insufficiency, Intracranial hemorrhage ([address] Obesity, Respiratory failure, acute ([address] and Seizures ([address] has a past surgical history that includes tracheostomy; Gastrostomy tube placement; and craniotomy.     Restrictions  Restrictions/Precautions  Restrictions/Precautions: General Precautions, Fall Risk     Vision/Hearing  Vision: Within Functional Limits  Hearing: Within functional limits       Subjective  General  Chart Reviewed: Yes  Response To Previous Treatment: Not applicable  Family / Caregiver Present: Yes  Follows Commands: Within Functional Limits  Pain Screening  Patient Currently in Pain: Denies     Orientation  Orientation  Overall Orientation Status: Within Functional Limits(expressive aphasia)     Social/Functional History  Social/Functional History  Lives With: Spouse  Type of Home: House  Home Layout: One level  Home Access: Ramped entrance  Home Equipment: Rolling walker, Electric scooter  ADL Assistance: Independent  Homemaking Assistance: Independent  Homemaking Responsibilities: Yes  Ambulation Assistance: Independent  Transfer Assistance: Independent  Active : No  Occupation: On disability  Additional Comments: Pt has family and friends that help with grocery shopping. Still able to walk around grocery store pushing cart; has scooter to take to the store. Cognition   Cognition  Overall Cognitive Status: WFL    Objective  AROM RLE (degrees)  RLE AROM: WFL  AROM LLE (degrees)  LLE AROM : WFL     Strength RLE  Strength RLE: WFL  Strength LLE  Strength LLE: WFL        Bed mobility  Comment: pt sitting up in chair at time of evaluation     Transfers  Sit to Stand: Supervision  Stand to sit: Supervision     Ambulation  Ambulation?: Yes  Ambulation 1  Surface: level tile  Device: No Device  Other Apparatus: O2(4L O2)  Assistance: Supervision  Distance: 5ft x1; 15ft x 1     Balance  Sitting - Dynamic: Good  Standing - Static: Good  Standing - Dynamic: Good;Fair        Plan   Plan  Times per week: 7 x week  Times per day: Twice a day  Current Treatment Recommendations: Strengthening, Gait Training, Patient/Caregiver Education & Training, IADL Training, Stair training, Balance Training, Neuromuscular Re-education, Functional Mobility Training, Endurance Training, Home Exercise Program, Transfer Training  Safety Devices  Type of devices: All fall risk precautions in place, Chair alarm in place         Goals  Short term goals  Time Frame for Short term goals: 10 days  Short term goal 1: Pt to ambulate 150ft without AD with no LOB. Short term goal 2: Pt to demonstrate good standing balance for decrease fall risk. Short term goal 3: Pt to complete all bed mob, transfers, and gait independently. Short term goal 4: Pt to tolerate 20-30 mins ther ex/act for improved strength and enduance with ADL's.    Patient Goals   Patient goals : Home following discharge       Therapy Time   Individual Concurrent Group Co-treatment   Time In 1203         Time Out 1227         Minutes 24                 Antonia Hutson, PT, DPT, CMPT

## 2019-04-02 PROBLEM — G93.41 METABOLIC ENCEPHALOPATHY: Status: ACTIVE | Noted: 2019-04-02

## 2019-04-02 PROBLEM — I50.32 CHRONIC DIASTOLIC CHF (CONGESTIVE HEART FAILURE), NYHA CLASS 3 (HCC): Status: ACTIVE | Noted: 2019-04-02

## 2019-04-02 LAB
ALBUMIN SERPL-MCNC: 3.2 G/DL (ref 3.5–5.2)
ALBUMIN/GLOBULIN RATIO: 0.9 (ref 1–2.5)
ALP BLD-CCNC: 82 U/L (ref 35–104)
ALT SERPL-CCNC: 11 U/L (ref 5–33)
ANION GAP SERPL CALCULATED.3IONS-SCNC: 12 MMOL/L (ref 9–17)
AST SERPL-CCNC: 9 U/L
BILIRUB SERPL-MCNC: 0.16 MG/DL (ref 0.3–1.2)
BUN BLDV-MCNC: 34 MG/DL (ref 8–23)
BUN/CREAT BLD: 11 (ref 9–20)
CALCIUM SERPL-MCNC: 9.5 MG/DL (ref 8.6–10.4)
CHLORIDE BLD-SCNC: 107 MMOL/L (ref 98–107)
CO2: 20 MMOL/L (ref 20–31)
CREAT SERPL-MCNC: 2.98 MG/DL (ref 0.5–0.9)
GFR AFRICAN AMERICAN: 19 ML/MIN
GFR NON-AFRICAN AMERICAN: 16 ML/MIN
GFR SERPL CREATININE-BSD FRML MDRD: ABNORMAL ML/MIN/{1.73_M2}
GFR SERPL CREATININE-BSD FRML MDRD: ABNORMAL ML/MIN/{1.73_M2}
GLUCOSE BLD-MCNC: 104 MG/DL (ref 74–100)
GLUCOSE BLD-MCNC: 160 MG/DL (ref 70–99)
GLUCOSE BLD-MCNC: 79 MG/DL (ref 74–100)
HCT VFR BLD CALC: 31.6 % (ref 36.3–47.1)
HEMOGLOBIN: 9.2 G/DL (ref 11.9–15.1)
MCH RBC QN AUTO: 28.8 PG (ref 25.2–33.5)
MCHC RBC AUTO-ENTMCNC: 29.1 G/DL (ref 28.4–34.8)
MCV RBC AUTO: 99.1 FL (ref 82.6–102.9)
NRBC AUTOMATED: 0 PER 100 WBC
PDW BLD-RTO: 13.3 % (ref 11.8–14.4)
PLATELET # BLD: ABNORMAL K/UL (ref 138–453)
PLATELET, FLUORESCENCE: 125 K/UL (ref 138–453)
PLATELET, IMMATURE FRACTION: 3.7 % (ref 1.1–10.3)
PMV BLD AUTO: ABNORMAL FL (ref 8.1–13.5)
POTASSIUM SERPL-SCNC: 4.9 MMOL/L (ref 3.7–5.3)
RBC # BLD: 3.19 M/UL (ref 3.95–5.11)
SODIUM BLD-SCNC: 139 MMOL/L (ref 135–144)
TOTAL PROTEIN: 6.7 G/DL (ref 6.4–8.3)
WBC # BLD: 10.4 K/UL (ref 3.5–11.3)

## 2019-04-02 PROCEDURE — G0378 HOSPITAL OBSERVATION PER HR: HCPCS

## 2019-04-02 PROCEDURE — 6360000002 HC RX W HCPCS: Performed by: ADVANCED PRACTICE MIDWIFE

## 2019-04-02 PROCEDURE — 82947 ASSAY GLUCOSE BLOOD QUANT: CPT

## 2019-04-02 PROCEDURE — 88304 TISSUE EXAM BY PATHOLOGIST: CPT

## 2019-04-02 PROCEDURE — 6370000000 HC RX 637 (ALT 250 FOR IP): Performed by: ADVANCED PRACTICE MIDWIFE

## 2019-04-02 PROCEDURE — 96375 TX/PRO/DX INJ NEW DRUG ADDON: CPT

## 2019-04-02 PROCEDURE — 1200000000 HC SEMI PRIVATE

## 2019-04-02 PROCEDURE — 36415 COLL VENOUS BLD VENIPUNCTURE: CPT

## 2019-04-02 PROCEDURE — 82746 ASSAY OF FOLIC ACID SERUM: CPT

## 2019-04-02 PROCEDURE — 2580000003 HC RX 258: Performed by: INTERNAL MEDICINE

## 2019-04-02 PROCEDURE — 96376 TX/PRO/DX INJ SAME DRUG ADON: CPT

## 2019-04-02 PROCEDURE — 82607 VITAMIN B-12: CPT

## 2019-04-02 PROCEDURE — 85027 COMPLETE CBC AUTOMATED: CPT

## 2019-04-02 PROCEDURE — 6370000000 HC RX 637 (ALT 250 FOR IP): Performed by: INTERNAL MEDICINE

## 2019-04-02 PROCEDURE — 80053 COMPREHEN METABOLIC PANEL: CPT

## 2019-04-02 PROCEDURE — 6360000002 HC RX W HCPCS: Performed by: INTERNAL MEDICINE

## 2019-04-02 RX ORDER — ALPRAZOLAM 0.5 MG/1
0.5 TABLET ORAL ONCE
Status: COMPLETED | OUTPATIENT
Start: 2019-04-02 | End: 2019-04-02

## 2019-04-02 RX ORDER — LORAZEPAM 2 MG/ML
1 INJECTION INTRAMUSCULAR EVERY 6 HOURS PRN
Status: DISCONTINUED | OUTPATIENT
Start: 2019-04-02 | End: 2019-04-05 | Stop reason: HOSPADM

## 2019-04-02 RX ORDER — MORPHINE SULFATE 4 MG/ML
4 INJECTION, SOLUTION INTRAMUSCULAR; INTRAVENOUS
Status: DISCONTINUED | OUTPATIENT
Start: 2019-04-02 | End: 2019-04-05 | Stop reason: HOSPADM

## 2019-04-02 RX ORDER — MORPHINE SULFATE 2 MG/ML
2 INJECTION, SOLUTION INTRAMUSCULAR; INTRAVENOUS
Status: DISCONTINUED | OUTPATIENT
Start: 2019-04-02 | End: 2019-04-05 | Stop reason: HOSPADM

## 2019-04-02 RX ORDER — ACETAMINOPHEN 10 MG/ML
1000 INJECTION, SOLUTION INTRAVENOUS ONCE
Status: COMPLETED | OUTPATIENT
Start: 2019-04-02 | End: 2019-04-02

## 2019-04-02 RX ADMIN — Medication 10 ML: at 12:44

## 2019-04-02 RX ADMIN — ASPIRIN 81 MG: 81 TABLET, COATED ORAL at 12:43

## 2019-04-02 RX ADMIN — CARBAMAZEPINE 100 MG: 100 TABLET, EXTENDED RELEASE ORAL at 12:45

## 2019-04-02 RX ADMIN — ACETAMINOPHEN 1000 MG: 10 INJECTION, SOLUTION INTRAVENOUS at 05:17

## 2019-04-02 RX ADMIN — LORAZEPAM 1 MG: 2 INJECTION INTRAMUSCULAR; INTRAVENOUS at 19:58

## 2019-04-02 RX ADMIN — LORAZEPAM 1 MG: 2 INJECTION INTRAMUSCULAR; INTRAVENOUS at 05:09

## 2019-04-02 RX ADMIN — ENALAPRIL MALEATE 2.5 MG: 2.5 TABLET ORAL at 20:00

## 2019-04-02 RX ADMIN — CARBAMAZEPINE 100 MG: 100 TABLET, EXTENDED RELEASE ORAL at 20:00

## 2019-04-02 RX ADMIN — Medication 10 ML: at 20:00

## 2019-04-02 RX ADMIN — Medication 3 MG: at 20:00

## 2019-04-02 RX ADMIN — OXYCODONE AND ACETAMINOPHEN 1 TABLET: 10; 325 TABLET ORAL at 00:43

## 2019-04-02 RX ADMIN — ALPRAZOLAM 0.5 MG: 0.5 TABLET ORAL at 04:11

## 2019-04-02 RX ADMIN — MORPHINE SULFATE 4 MG: 4 INJECTION INTRAVENOUS at 02:59

## 2019-04-02 ASSESSMENT — PAIN SCALES - GENERAL
PAINLEVEL_OUTOF10: 10
PAINLEVEL_OUTOF10: 0
PAINLEVEL_OUTOF10: 0
PAINLEVEL_OUTOF10: 10
PAINLEVEL_OUTOF10: 0
PAINLEVEL_OUTOF10: 0
PAINLEVEL_OUTOF10: 10

## 2019-04-02 NOTE — PROGRESS NOTES
[facility] SPECIALTY [facility]  OCCUPATIONAL THERAPY  No Visit Note    [] ICU    [x] Acute   Patient: Hamlet Chairez  Room: [facility]/5235-77      Hamlet Chairez not seen on 4/2/2019 at 2:38 PM due to patient sleeping upon arrival, doesn't easily arouse. Per nursing, patient may be more drowsy this afternoon d/t having ativan/morphine early this am and pap test/doppler done today. Will allow patient to rest and attempt OT evaluation when available.         Signature: Electronically signed by Alcon Frazier OT on 4/2/2019 at 2:40 PM

## 2019-04-02 NOTE — FLOWSHEET NOTE
Pap test and vaginal exam andersen with 2 assist per Dr Sandoval Counter. Patient tolerated well. Drowsy. Patient cleaned, brief changed.  HOB up for lunch

## 2019-04-02 NOTE — PROGRESS NOTES
Patient in bathroom pushing for a long period of time trying to have a bowel movement. When patient stood blood was noted in the toilet. Writer helped patient back to bed to see where bleeding was coming from. Upon evaluation, vaginal bleeding was noted. Once patient was cleaned up and positioned in bed no further bleeding was noted. Will continue to monitor.

## 2019-04-02 NOTE — PROGRESS NOTES
Patient crying and yelling in pain. Patient cannot bear to sit down or lie in bed for short periods of time. Writer will call Dr. Amanda Cuellar for further orders.

## 2019-04-02 NOTE — PROGRESS NOTES
Hospitalist Progress Note    SUBJECTIVE/INTERVAL HISTORY:    Patient seen in follow up for pelvic pain, CKD, CHF, hypoxia, obesity, urinary retention, seizure disorder, h/o cerebral aneurysm. Patient was found hiding home meds in drawer. Uncertain what she had taken. She became agitated early morning and complained of pain. Patient passed multiple vaginal clots. Patient continued to have agitation and was given ativan. This morning on exam patient somnolent. Remains on O2. Poor respiratory effort. BLE venous doppler negative    ECHO:  Summary  Poor image quality, likely due to patient body habitus and/or lung disease. Definity was used to better assess the endocardial border. Global left ventricular function is difficult to assess but appears normal  with an estimated EF of 60%. Mild left ventricular hypertrophy with a normal left ventricular cavity  size. Unable to assess specific wall motion abnormalities due to image quality. Mild tricuspid regurgitation. Mild pulmonary hypertension with an estimated right ventricular systolic  pressure of 30 mmHg. Evidence of moderate diastolic dysfunction is seen. No prior study available for comparison.     OBJECTIVE:    Vitals:   Temp: 97.6 °F (36.4 °C)  BP: (!) 92/42  Resp: 16  Pulse: 56  SpO2: 92 %  24HR INTAKE/OUTPUT:      Intake/Output Summary (Last 24 hours) at 4/2/2019 0848  Last data filed at 4/2/2019 7314  Gross per 24 hour   Intake 910 ml   Output 1675 ml   Net -765 ml       -----------------------------------------------------------------  Review of Systems:  Unable to obtain, patient somnolent    Exam:  GEN: somnolent oriented to person, well-developed and well-nourished, in no acute distress  EYES: PERRL  NECK: normal, supple  PULM: Diminished bilaterally - no wheezes, rales or rhonchi,no respiratory distress, poor effort, on O2  COR: regular rate & rhythm and no murmurs  ABD:  Obese, soft, mild suprapubic tenderness, non-distended, normal bowel sounds  EXT:   edema: 1+ affecting bilateral foot, bilateral ankle  NEURO: follows commands, POLANCO, expressive aphagia  SKIN:  no rashes or significant lesions      -----------------------------------------------------------------  Diagnostic Data:  Lab Results   Component Value Date    WBC 10.4 04/02/2019    HGB 9.2 (L) 04/02/2019    HCT 31.6 (L) 04/02/2019    PLT See Reflexed IPF Result 04/02/2019    ALT 11 04/02/2019    AST 9 04/02/2019     04/02/2019    K 4.9 04/02/2019     04/02/2019    CREATININE 2.98 (H) 04/02/2019    BUN 34 (H) 04/02/2019    CO2 20 04/02/2019    INR 1.0 05/13/2017    LABA1C 5.7 01/19/2017       ASSESSMENT:    Principal Problem:    Pelvic pain  Active Problems:    Obesity    Seizures (HCC)    Chronic renal insufficiency    Cerebral aneurysm    Urinary retention    Hypoxia    CKD (chronic kidney disease) stage 4, GFR 15-29 ml/min ([facility])  Resolved Problems:    * No resolved hospital problems.  *      Patient Active Problem List    Diagnosis Date Noted    Urinary retention 04/01/2019    Hypoxia 04/01/2019    CKD (chronic kidney disease) stage 4, GFR 15-29 ml/min ([facility]) 04/01/2019    Obesity     Seizures (HCC)     Chronic renal insufficiency     Pelvic pain 03/31/2019    CKD (chronic kidney disease) stage 3, GFR 30-59 ml/min ([facility] [address] 10/28/2015    Essential hypertension 10/28/2015    Hemorrhagic cerebrovascular accident (CVA) ([facility] [address] 10/28/2015    Cholelithiasis 04/12/2012    Cerebral aneurysm 01/01/1997       PLAN:    Metabolic encephalopathy - possibly due to patient taking her own medications without knowledge of nursing staff   Continue to monitor    Pelvic pain - vaginal bleeding   Appreciate GYN   PAP and bx today   Stop blood thinners    Obesity    Seizures    Continue home meds    Cerebral aneurysm    Urinary retention   Will remove jay prior to discharge - keep for today    Hypoxia   Ween O2   Stop steroids   Nebs   IS    Doppler negative - stop heparin    CKD  stage 4,

## 2019-04-02 NOTE — PROGRESS NOTES
[facility]  Inpatient/Observation/Outpatient Rehabilitation    Date: 2019  Patient Name: Tank Saleem       [x] Inpatient Acute/Observation       []  Outpatient  : 1955     Attempted to see pt on 19 for PM treat. Nurse at bedside when entering pt room with OT and nurse stated \"pt had a bad night and received ativan and morphine and it's just now catching up to her\". Attempted to wake pt and after several attempts pt responded verbally, however was unable to keep eyes open and not appropriate for tx at this time.  Will attempt tx in the AM.      Justina Goodell, PTA Date: 2019

## 2019-04-02 NOTE — FLOWSHEET NOTE
Patient trying to get out of bed, states she has to have a BM. Up to bathroom with assist. Unsteady on her feet. No BM. Small amount of red drainage noted in her brief. Patient cleaned and returned to bed. Resting with eyes closed.  Bed alarm on for safety

## 2019-04-02 NOTE — FLOWSHEET NOTE
Wakes up when talked to . Confused, thinks she is in a [address] . Cleaned a small amount of red vaginal bleeding. Few small clots noted.  Dozes back off to sleep

## 2019-04-03 PROBLEM — N18.9 ACUTE RENAL FAILURE SUPERIMPOSED ON CHRONIC KIDNEY DISEASE (HCC): Status: ACTIVE | Noted: 2019-04-03

## 2019-04-03 PROBLEM — N17.9 ACUTE RENAL FAILURE SUPERIMPOSED ON STAGE 4 CHRONIC KIDNEY DISEASE (HCC): Status: ACTIVE | Noted: 2019-04-03

## 2019-04-03 PROBLEM — E87.5 HYPERKALEMIA: Status: ACTIVE | Noted: 2019-04-03

## 2019-04-03 PROBLEM — N18.4 ACUTE RENAL FAILURE SUPERIMPOSED ON STAGE 4 CHRONIC KIDNEY DISEASE (HCC): Status: ACTIVE | Noted: 2019-04-03

## 2019-04-03 PROBLEM — N89.8 VAGINAL MASS: Status: ACTIVE | Noted: 2019-04-03

## 2019-04-03 LAB
ALBUMIN SERPL-MCNC: 3.5 G/DL (ref 3.5–5.2)
ALBUMIN/GLOBULIN RATIO: 1.1 (ref 1–2.5)
ALP BLD-CCNC: 85 U/L (ref 35–104)
ALT SERPL-CCNC: 13 U/L (ref 5–33)
ANION GAP SERPL CALCULATED.3IONS-SCNC: 9 MMOL/L (ref 9–17)
AST SERPL-CCNC: 12 U/L
BILIRUB SERPL-MCNC: <0.1 MG/DL (ref 0.3–1.2)
BUN BLDV-MCNC: 36 MG/DL (ref 8–23)
BUN/CREAT BLD: 12 (ref 9–20)
CALCIUM SERPL-MCNC: 9.7 MG/DL (ref 8.6–10.4)
CHLORIDE BLD-SCNC: 111 MMOL/L (ref 98–107)
CO2: 25 MMOL/L (ref 20–31)
CREAT SERPL-MCNC: 2.99 MG/DL (ref 0.5–0.9)
GFR AFRICAN AMERICAN: 19 ML/MIN
GFR NON-AFRICAN AMERICAN: 16 ML/MIN
GFR SERPL CREATININE-BSD FRML MDRD: ABNORMAL ML/MIN/{1.73_M2}
GFR SERPL CREATININE-BSD FRML MDRD: ABNORMAL ML/MIN/{1.73_M2}
GLUCOSE BLD-MCNC: 100 MG/DL (ref 74–100)
GLUCOSE BLD-MCNC: 101 MG/DL (ref 74–100)
GLUCOSE BLD-MCNC: 102 MG/DL (ref 70–99)
GLUCOSE BLD-MCNC: 107 MG/DL (ref 74–100)
GLUCOSE BLD-MCNC: 87 MG/DL (ref 74–100)
GLUCOSE BLD-MCNC: 98 MG/DL (ref 74–100)
HCT VFR BLD CALC: 31.7 % (ref 36.3–47.1)
HEMOGLOBIN: 9.2 G/DL (ref 11.9–15.1)
MCH RBC QN AUTO: 29.2 PG (ref 25.2–33.5)
MCHC RBC AUTO-ENTMCNC: 29 G/DL (ref 28.4–34.8)
MCV RBC AUTO: 100.6 FL (ref 82.6–102.9)
NRBC AUTOMATED: 0 PER 100 WBC
PDW BLD-RTO: 13.7 % (ref 11.8–14.4)
PLATELET # BLD: 150 K/UL (ref 138–453)
PMV BLD AUTO: 10.9 FL (ref 8.1–13.5)
POTASSIUM SERPL-SCNC: 4.3 MMOL/L (ref 3.7–5.3)
POTASSIUM SERPL-SCNC: 5.6 MMOL/L (ref 3.7–5.3)
RBC # BLD: 3.15 M/UL (ref 3.95–5.11)
SODIUM BLD-SCNC: 145 MMOL/L (ref 135–144)
TOTAL PROTEIN: 6.8 G/DL (ref 6.4–8.3)
WBC # BLD: 10.3 K/UL (ref 3.5–11.3)

## 2019-04-03 PROCEDURE — 97110 THERAPEUTIC EXERCISES: CPT

## 2019-04-03 PROCEDURE — 84132 ASSAY OF SERUM POTASSIUM: CPT

## 2019-04-03 PROCEDURE — 97166 OT EVAL MOD COMPLEX 45 MIN: CPT

## 2019-04-03 PROCEDURE — 1200000000 HC SEMI PRIVATE

## 2019-04-03 PROCEDURE — 85027 COMPLETE CBC AUTOMATED: CPT

## 2019-04-03 PROCEDURE — 6370000000 HC RX 637 (ALT 250 FOR IP): Performed by: PHYSICIAN ASSISTANT

## 2019-04-03 PROCEDURE — 6370000000 HC RX 637 (ALT 250 FOR IP): Performed by: INTERNAL MEDICINE

## 2019-04-03 PROCEDURE — 97116 GAIT TRAINING THERAPY: CPT

## 2019-04-03 PROCEDURE — 0U9GXZX DRAINAGE OF VAGINA, EXTERNAL APPROACH, DIAGNOSTIC: ICD-10-PCS | Performed by: INTERNAL MEDICINE

## 2019-04-03 PROCEDURE — 82947 ASSAY GLUCOSE BLOOD QUANT: CPT

## 2019-04-03 PROCEDURE — 80053 COMPREHEN METABOLIC PANEL: CPT

## 2019-04-03 PROCEDURE — 36415 COLL VENOUS BLD VENIPUNCTURE: CPT

## 2019-04-03 PROCEDURE — 80157 ASSAY CARBAMAZEPINE FREE: CPT

## 2019-04-03 PROCEDURE — 80156 ASSAY CARBAMAZEPINE TOTAL: CPT

## 2019-04-03 PROCEDURE — 51798 US URINE CAPACITY MEASURE: CPT

## 2019-04-03 PROCEDURE — 2580000003 HC RX 258: Performed by: INTERNAL MEDICINE

## 2019-04-03 PROCEDURE — 99223 1ST HOSP IP/OBS HIGH 75: CPT | Performed by: INTERNAL MEDICINE

## 2019-04-03 RX ORDER — CARBAMAZEPINE 100 MG/1
200 TABLET, EXTENDED RELEASE ORAL 2 TIMES DAILY
Status: DISCONTINUED | OUTPATIENT
Start: 2019-04-03 | End: 2019-04-05 | Stop reason: HOSPADM

## 2019-04-03 RX ORDER — SODIUM POLYSTYRENE SULFONATE 15 G/60ML
30 SUSPENSION ORAL; RECTAL ONCE
Status: COMPLETED | OUTPATIENT
Start: 2019-04-03 | End: 2019-04-03

## 2019-04-03 RX ORDER — CARBAMAZEPINE 100 MG/1
100 TABLET, EXTENDED RELEASE ORAL ONCE
Status: COMPLETED | OUTPATIENT
Start: 2019-04-03 | End: 2019-04-03

## 2019-04-03 RX ADMIN — CARBAMAZEPINE 100 MG: 100 TABLET, EXTENDED RELEASE ORAL at 10:46

## 2019-04-03 RX ADMIN — CARBAMAZEPINE 100 MG: 100 TABLET, EXTENDED RELEASE ORAL at 11:14

## 2019-04-03 RX ADMIN — POLYETHYLENE GLYCOL (3350) 17 G: 17 POWDER, FOR SOLUTION ORAL at 10:45

## 2019-04-03 RX ADMIN — Medication 3 MG: at 20:58

## 2019-04-03 RX ADMIN — ASPIRIN 81 MG: 81 TABLET, COATED ORAL at 08:42

## 2019-04-03 RX ADMIN — CARBAMAZEPINE 200 MG: 100 TABLET, EXTENDED RELEASE ORAL at 21:00

## 2019-04-03 RX ADMIN — SODIUM POLYSTYRENE SULFONATE 30 G: 15 SUSPENSION ORAL; RECTAL at 10:45

## 2019-04-03 RX ADMIN — Medication 10 ML: at 20:59

## 2019-04-03 RX ADMIN — Medication 10 ML: at 08:42

## 2019-04-03 ASSESSMENT — PAIN SCALES - GENERAL
PAINLEVEL_OUTOF10: 0

## 2019-04-03 NOTE — PROGRESS NOTES
Department of Gynecology  Attending Progress Note          SUBJECTIVE:  Pt with vaginal bleeding and pelvic pain - previously had planned outpatient pap and endobiopsy but did at bedside 4/2/19 as pt's discharge planning uncertain    OBJECTIVE:           Physical Exam  VITALS:  BP (!) 97/44   Pulse 67   Temp 97.4 °F (36.3 °C) (Temporal)   Resp 16   Ht 5' 4\" (1.626 m)   Wt 230 lb 12.8 oz (104.7 kg)   SpO2 92%   BMI 39.62 kg/m²     CONSTITUTIONAL:  awake, somnolent, cooperative, no apparent distress and moderately obese  ABDOMEN:  soft, non-distended and non-tender  GENITAL/URINARY:  External Genitalia:  General appearance; normal, Hair distribution; normal, Lesions absent  Urethral Meatus:  Size normal, Location normal, Lesions absent, Prolapse absent,Tenderness absent  Vagina:  Abnormal findings: bleeding noted unable to palpate or pass speculum beyond approximately 2 cm   Cervix:  Abnormal findings: unable to palpate due to mass obliterating vaginal canal  Uterus:  Abnormal findings: unable to palpate due to vaginal mass  NEUROLOGIC:  aphasic    DATA:  Radiology Review:  Ct/usn    ASSESSMENT AND PLAN:    Patient C/Devan Tenorio [address] Problem List:   Pelvic pain (3/31/2019)    Assessment: vaginal bleeding and pelvic pain - highly suspicious for cervical cancer as mass is obliterating the vaginal canal     Plan:  Will discuss with hospitalist - MRI may be helpful; pathology may be helpful if specimen not totally obscured by blood; palliative XRT may be of benefit but would need to be outpatient as not available here   Obesity ()   Seizures (HCC) ()   Chronic renal insufficiency ()   Cerebral aneurysm (1/1/1997)   Urinary retention (4/1/2019)   Hypoxia (4/1/2019)   CKD (chronic kidney disease) stage 4, GFR 15-29     ml/min (HCC) (4/1/2019)   Chronic diastolic CHF (congestive heart failure), NYHA     class 3 ([facility] [address] (1/5/5597)   Metabolic encephalopathy (5/7/8478)

## 2019-04-03 NOTE — PROGRESS NOTES
Occupational Therapy   Occupational Therapy Initial Assessment  Date: 4/3/2019   Patient Name: Lorena Steward  MRN: [id number]     : 1955    Date of Service: 4/3/2019    Discharge Recommendations:  Continue to assess pending progress       Assessment   Performance deficits / Impairments: Decreased functional mobility ; Decreased safe awareness;Decreased ADL status; Decreased endurance;Decreased strength  Prognosis: Good  Decision Making: Medium Complexity  REQUIRES OT FOLLOW UP: Yes  Activity Tolerance  Activity Tolerance: Patient limited by fatigue  Activity Tolerance: Pt not very motivated to work this date. Safety Devices  Safety Devices in place: Yes  Type of devices: Bed alarm in place; Left in bed;Call light within reach           Patient Diagnosis(es): The primary encounter diagnosis was Endometrial disorder. Diagnoses of Abdominal pain, unspecified abdominal location and Acute renal failure superimposed on chronic kidney disease, unspecified CKD stage, unspecified acute renal failure type [facility]) were also pertinent to this visit. has a past medical history of Cerebral aneurysm, Chronic renal insufficiency, Intracranial hemorrhage ([facility] [address] Obesity, Respiratory failure, acute ([facility] [address] and Seizures ([facility] [address] has a past surgical history that includes tracheostomy; Gastrostomy tube placement; and craniotomy.            Restrictions  Restrictions/Precautions  Restrictions/Precautions: General Precautions, Fall Risk    Subjective   General  Chart Reviewed: Yes  Patient assessed for rehabilitation services?: Yes  Pain Assessment  Pain Assessment: 0-10  Pain Level: 0  Pain Type: Acute pain  Pain Location: Pelvis  Pain Orientation: Lower, Mid  Pain Descriptors: Burning, Pressure  Pain Frequency: Continuous  Non-Pharmaceutical Pain Intervention(s): Relaxation techniques, Emotional support, Distraction  Response to Pain Intervention: Asleep with RR greater than 10  Oxygen Therapy  SpO2: 95 %  Pulse Oximeter Device Mode: Intermittent  Pulse Oximeter Device Location: Right;Finger  O2 Device: None (Room air)  Social/Functional History  Social/Functional History  Lives With: Spouse  Type of Home: House  Home Layout: One level  Home Access: Ramped entrance  Bathroom Shower/Tub: Walk-in shower  Bathroom Toilet: Standard  Bathroom Equipment: Shower chair, Grab bars in shower, Grab bars around toilet  Home Equipment: Rolling walker, Electric scooter  Receives Help From: Family  ADL Assistance: Independent  Homemaking Assistance: Independent  Homemaking Responsibilities: Yes  Ambulation Assistance: Independent  Transfer Assistance: Independent  Active : No  Occupation: On disability  Additional Comments: Pt has family and friends that help with grocery shopping. Still able to walk around grocery store pushing cart; has scooter to take to the store. Objective   Vision: Within Functional Limits  Hearing: Within functional limits    Orientation  Overall Orientation Status: Within Functional Limits(Pt requires 2 choices to choose from, but answers correctly, d/t expressive aphasia)        ADL  Feeding: Independent  Grooming: Stand by assistance  UE Bathing: Stand by assistance  LE Bathing: Unable to assess(comment)(not assessed at time of eval, but unable d/t impulsivity with donning and doffing socks, some assist would be needed)  UE Dressing: Stand by assistance(d/t impulsivity)  LE Dressing: Contact guard assistance(doffed R sock with CGA. Unable to doff L sock d/t past CVA on left side. Impulsive when reaching down and pulling leg up)        Bed mobility  Rolling to Right: Stand by assistance  Sit to Supine: Stand by assistance  Scooting: Stand by assistance  Transfers  Transfer Comments: Pt unwilling to get out of bed at time of evaluation. Supine to sit EOB briefly.      Cognition  Overall Cognitive Status: (difficulty d/t expressive aphasia)      LUE AROM (degrees)  LUE AROM : WFL  RUE AROM (degrees)  RUE AROM : WFL  RUE General AROM: decreased when compared to RUE d/t past CVA  Right Hand AROM (degrees)  Right Hand AROM: WFL  Right Hand General AROM: decreased movement compared to L hand d/t past CVA  LUE Strength  Gross LUE Strength: WFL  RUE Strength  Gross RUE Strength: WFL       Plan   Plan  Times per week: 7x/wek  Times per day: Daily  Current Treatment Recommendations: Strengthening, Endurance Training, Patient/Caregiver Education & Training, Self-Care / ADL, Safety Education & Training, Functional Mobility Training    Goals  Short term goals  Time Frame for Short term goals: 10 days  Short term goal 1: Pt will complete LB dressing/bathing c SUP with use of AE to increase independence and safety with ADL. Short term goal 2: Pt will tolerate standing for 8 minutes while engaging in therapeutic activity of choice with no LOB to increase safety for functional transfers and ADL. Short term goal 3: Pt will engage in 15 minutes of therex/theract to increase BUE strength for increased independence with ADL and functional transfers. Short term goal 4: Pt will complete grooming and oral hygiene routine while standing sinkside with SUP to increase independence for ADL engagement.        Therapy Time   Individual Concurrent Group Co-treatment   Time In 1345         Time Out 1412         Minutes 48 Sin Barnes OTR/L

## 2019-04-03 NOTE — PROGRESS NOTES
Patient attempted to have a bowel movement. Pt strained for a while but was unsuccessful. There was bright red blood in th toile when the patient stood up. Penelope care performed and pt walked back to chair. Pt agreeable to take miralx for constipation.

## 2019-04-03 NOTE — PROGRESS NOTES
Physical Therapy  Facility/Department: [facility] AT THE [facility] MED SURG  Daily Treatment Note  NAME: Rc Russell  : 1955  MRN: [id number]    Date of Service: 4/3/2019    Discharge Recommendations:  Home with assist PRN        Patient Diagnosis(es): The primary encounter diagnosis was Endometrial disorder. Diagnoses of Abdominal pain, unspecified abdominal location and Acute renal failure superimposed on chronic kidney disease, unspecified CKD stage, unspecified acute renal failure type [facility]) were also pertinent to this visit. has a past medical history of Cerebral aneurysm, Chronic renal insufficiency, Intracranial hemorrhage ([facility] [address] Obesity, Respiratory failure, acute ([facility] [address] and Seizures ([facility] [address] has a past surgical history that includes tracheostomy; Gastrostomy tube placement; and craniotomy. Restrictions  Restrictions/Precautions  Restrictions/Precautions: General Precautions, Fall Risk  Subjective   General  Chart Reviewed: Yes  Response To Previous Treatment: Patient with no complaints from previous session. Family / Caregiver Present: No  Subjective  Subjective: Pt denies pain at rest. States \"It will hurt when I stand. \"  Pain Screening  Patient Currently in Pain: Denies  Vital Signs  Patient Currently in Pain: Denies       Orientation  Orientation  Overall Orientation Status: Within Functional Limits  Cognition      Objective         Ambulation  Ambulation?: No        Exercises  Straight Leg Raise: x15  Quad Sets: x15  Heelslides: x15  Hip Abduction: x15  Knee Long Arc Quad: x15  Knee Short Arc Quad: x15  Ankle Pumps: 15x2  Comments: seated marching x15, B LE ther ex completed reclined and seated                        Assessment   Body structures, Functions, Activity limitations: Decreased functional mobility ; Decreased endurance;Decreased strength;Decreased safe awareness  Assessment: Pt demonstrated fair tolerance to tx. AAROM as needed. Refused to amb due to not wanting to \"feel the pain. \"  Treatment

## 2019-04-03 NOTE — CONSULTS
_                         Today's Date: 4/3/2019  Patient Name: Saskia Mon  Date of admission: 3/31/2019  4:42 AM  Patient's age: 70 y.o., 1955  Admission Dx: Pelvic pain [R10.2]  Pelvic pain [R10.2]  Hypoxia [R09.02]      Requesting Physician: Jatin Duarte MD    CHIEF COMPLAINT:  Pelvic pain. Bleeding. Pelvic mass. History Obtained From:  patient, electronic medical record    HISTORY OF PRESENT ILLNESS:      The patient is a 70 y.o.  female who is admitted to the hospital for further management of  Lower abdominal pain and vaginal bleeding. Patient had multiple medical co morbidities as listed. She had CVA with residual expressive aphasia. Difficult to get history from the patient. She had lower abdominal pain for several weeks getting worse lately. She had vaginal bleeding. She was evaluated in ER and had CT scan and was found to have pelvic mass with uterine wall thickening. She was evaluated by GYN. Pelvic exam showed protruding uterine/ cervix lesion to vagina. Biopsy was done. She had history  Of CRI with anemia of CRF. No smoking or alcohol drinking. Past Medical History:   has a past medical history of Cerebral aneurysm, Chronic renal insufficiency, Intracranial hemorrhage ([address] Obesity, Respiratory failure, acute ([address] and Seizures ([address] Past Surgical History:   has a past surgical history that includes tracheostomy; Gastrostomy tube placement; and craniotomy. Family History: family history is not on file. Social History:   reports that she has quit smoking. Her smoking use included cigarettes. She has a 15.00 pack-year smoking history. She has never used smokeless tobacco. She reports that she does not drink alcohol. Medications:    Prior to Admission medications    Medication Sig Start Date End Date Taking? Authorizing Provider   carbamazepine (TEGRETOL XR) 100 MG SR tablet Take 100 mg by mouth 2 times daily. S1, S2, no murmurs, rubs, clicks or gallops  Abdomen - soft, nontender, nondistended, no masses or organomegaly  Neurological - alert, oriented, expressive aphasia. Musculoskeletal - no joint tenderness, deformity or swelling  Extremities - peripheral pulses normal, no pedal edema, no clubbing or cyanosis  Skin - normal coloration and turgor, no rashes, no suspicious skin lesions noted           DATA:      Labs:       CBC:   Recent Labs     04/02/19  0544 04/03/19  0548   WBC 10.4 10.3   HGB 9.2* 9.2*   HCT 31.6* 31.7*   PLT See Reflexed IPF Result 150     BMP:   Recent Labs     04/02/19  0544 04/03/19  0548 04/03/19  1659    145*  --    K 4.9 5.6* 4.3   CO2 20 25  --    BUN 34* 36*  --    CREATININE 2.98* 2.99*  --    LABGLOM 16* 16*  --    GLUCOSE 160* 102*  --      PT/INR: No results for input(s): PROTIME, INR in the last 72 hours. APTT:No results for input(s): APTT in the last 72 hours. LIVER PROFILE:  Recent Labs     04/02/19  0544 04/03/19  0548   AST 9 12   ALT 11 13   LABALBU 3.2* 3.5     CT scan 4/1/19:  Impression   1. Distension of the endometrial canal with complex fluid and probable   hemorrhage.  Increased soft tissue density towards the cervix.  The findings   are concerning for underlying malignancy.  Ultrasound correlation and   surgical consultation recommended. 2. Mixed attenuation within the gallbladder, likely cholelithiasis and   sludge.  No acute features. 3. Bilateral renal cortical atrophy and scarring.  Mild left-sided   pyelocaliectasis and ureterectasis.  No obstructing calculi identified. 4. Diverticulosis with no acute features.              IMPRESSION:    Primary Problem  Pelvic pain    Active Hospital Problems    Diagnosis Date Noted    Hyperkalemia [E87.5] 04/03/2019    Acute renal failure superimposed on stage 4 chronic kidney disease ([facility] [address] [N17.9, N18.4] 04/03/2019    Vaginal mass [N89.9] 04/03/2019    Chronic diastolic CHF (congestive heart failure), NYHA class 3 ([address] [I50.32] 33/70/6447    Metabolic encephalopathy [Z50.12] 04/02/2019    Urinary retention [R33.9] 04/01/2019    Hypoxia [R09.02] 04/01/2019    CKD (chronic kidney disease) stage 4, GFR 15-29 ml/min ([address] [N18.4] 04/01/2019    Obesity [E66.9]     Seizures ([address] [R56.9]     Chronic renal insufficiency [N18.9]     Pelvic pain [R10.2] 03/31/2019    Cerebral aneurysm [I67.1] 01/01/1997       RECOMMENDATIONS:  1. Images and labs were reviewed and discussed with the patient. 2. Explained that likely dealing with malignancy presenting with pelvic mass as stated above. Uterine or cervical cancer. Pending pathology results of the biopsy done 4/2/19  3. Further recommendations will be based on pathology results. For squamous carcinoma, she will be treated as cervical cancer with radiation and chemotherapy. Cisplatin will t be used due to renal insufficiency. On the other hand, if pathology is positive for adenocarcinoma, will be treated as uterine cancer with surgery by [facility] oncology followed by chemotherapy. 4. We will follow up on pathology for further recommendations. 5. She has chronic anemia of chronic renal insufficiency. She will benefit from Aranesp treatment down the line. 6. Possible discharge tomorrow. 7. Patient's questions were answered to the best of her satisfaction and she verbalized full understanding and agreement. 8. Thank you for allowing us to participate in the care of this pleasant patient. Discussed with patient and Nurse. MD Jamey Torres MD  Cell: [phone]    This note is created with the assistance of a speech recognition program.  While intending to generate a document that actually reflects the content of the visit, the document can still have some errors including those of syntax and sound a like substitutions which may escape proof reading. It such instances, actual meaning can be extrapolated by contextual diversion.

## 2019-04-03 NOTE — PROGRESS NOTES
Physical Therapy  Facility/Department: [facility] AT THE [facility] MED SURG  Daily Treatment Note  NAME: Mónica Moy  : 1955  MRN: [id number]    Date of Service: 4/3/2019    Discharge Recommendations:  Home with assist PRN        Patient Diagnosis(es): The primary encounter diagnosis was Endometrial disorder. Diagnoses of Abdominal pain, unspecified abdominal location and Acute renal failure superimposed on chronic kidney disease, unspecified CKD stage, unspecified acute renal failure type [facility]) were also pertinent to this visit. has a past medical history of Cerebral aneurysm, Chronic renal insufficiency, Intracranial hemorrhage ([facility] [address] Obesity, Respiratory failure, acute ([facility] [address] and Seizures ([facility] [address] has a past surgical history that includes tracheostomy; Gastrostomy tube placement; and craniotomy. Restrictions  Restrictions/Precautions  Restrictions/Precautions: General Precautions, Fall Risk  Subjective   General  Chart Reviewed: Yes  Response To Previous Treatment: Patient with no complaints from previous session. Family / Caregiver Present: No  Subjective  Subjective: Pt denies pain and is sitting in recliner upon arrival.    Pain Screening  Patient Currently in Pain: No  Vital Signs  Patient Currently in Pain: No       Orientation  Orientation  Overall Orientation Status: Within Functional Limits  Cognition      Objective      Transfers  Sit to Stand: Stand by assistance  Stand to sit: Stand by assistance  Ambulation  Ambulation?: Yes  Ambulation 1  Surface: level tile  Device: No Device  Assistance: Stand by assistance  Quality of Gait: Waddle gait  Distance: 45 ft   Comments: Pt amb with no AD and SBA - no LOB noted. Exercises  Hip Flexion: x15  Hip Abduction: x15  Knee Long Arc Quad: x15  Ankle Pumps: x15  Comments: Above exercises completed seated in recliner. Visual cues for correct technique. Assessment   Assessment: Pt tolerated tx well.   Pt initially refused to ambulate, however, agreed once having to get up to use bathroom. Treatment Diagnosis: general weakness  Prognosis: Good  Patient Education: Pt educated on participation with therapy and correct technique. REQUIRES PT FOLLOW UP: Yes  Activity Tolerance  Activity Tolerance: Patient Tolerated treatment well     Goals  Short term goals  Time Frame for Short term goals: 10 days  Short term goal 1: Pt to ambulate 150ft without AD with no LOB. Short term goal 2: Pt to demonstrate good standing balance for decrease fall risk. Short term goal 3: Pt to complete all bed mob, transfers, and gait independently. Short term goal 4: Pt to tolerate 20-30 mins ther ex/act for improved strength and enduance with ADL's. Patient Goals   Patient goals : Home following discharge    Plan    Plan  Times per week: 7 x week  Times per day: Twice a day  Current Treatment Recommendations: Strengthening, Gait Training, Patient/Caregiver Education & Training, IADL Training, Stair training, Balance Training, Neuromuscular Re-education, Functional Mobility Training, Endurance Training, Home Exercise Program, Transfer Training  Safety Devices  Type of devices:  All fall risk precautions in place, Call light within reach, Left in chair, Nurse notified     Therapy Time   Individual Concurrent Group Co-treatment   Time In 1521         Time Out 1546         Minutes [address], PTA

## 2019-04-03 NOTE — PROGRESS NOTES
Hospitalist Progress Note    SUBJECTIVE/INTERVAL HISTORY:    Patient seen in follow up for pelvic pain, acute on CKD, CHF, hypoxia, obesity, urinary retention, seizure disorder, h/o cerebral aneurysm, mental status change. Cr 2.99. K=5.6. Na 145. Mental status improved. On RA with SaO2 92%. SBPs 90s-100s. She had pelvic exam, pap and endobiopsy 4/2/19.     OBJECTIVE:    Vitals:   Temp: 97.4 °F (36.3 °C)  BP: (!) 97/44  Resp: 16  Pulse: 67  SpO2: 92 %  24HR INTAKE/OUTPUT:      Intake/Output Summary (Last 24 hours) at 4/3/2019 0917  Last data filed at 4/3/2019 0061  Gross per 24 hour   Intake 800 ml   Output 1375 ml   Net -575 ml       -----------------------------------------------------------------  Review of Systems:  Unable to obtain, patient somnolent    Exam:  GEN: more awake today, oriented to person, place, not year, well-developed and well-nourished, in no acute distress  EYES: PERRL  NECK: normal, supple  PULM: Diminished bilaterally - no wheezes, rales or rhonchi,no respiratory distress, poor effort, on RA  COR: regular rate & rhythm and no murmurs  ABD:  Obese, soft, no tenderness, non-distended, normal bowel sounds  EXT:   edema: 1+ affecting bilateral foot, bilateral ankle  : jay  NEURO: follows commands, POLANCO, expressive aphagia  SKIN:  no rashes or significant lesions    -----------------------------------------------------------------  Diagnostic Data:  Lab Results   Component Value Date    WBC 10.3 04/03/2019    HGB 9.2 (L) 04/03/2019    HCT 31.7 (L) 04/03/2019     04/03/2019    ALT 13 04/03/2019    AST 12 04/03/2019     (H) 04/03/2019    K 5.6 (H) 04/03/2019     (H) 04/03/2019    CREATININE 2.99 (H) 04/03/2019    BUN 36 (H) 04/03/2019    CO2 25 04/03/2019    INR 1.0 05/13/2017    LABA1C 5.7 01/19/2017       ASSESSMENT:    Principal Problem:    Pelvic pain  Active Problems:    Obesity    Seizures (HCC)    Chronic renal insufficiency    Cerebral aneurysm    Urinary retention Hypoxia    CKD (chronic kidney disease) stage 4, GFR 15-29 ml/min ([facility])    Chronic diastolic CHF (congestive heart failure), NYHA class 3 ([facility])    Metabolic encephalopathy    Hyperkalemia    Acute renal failure superimposed on stage 4 chronic kidney disease (HCC)    Vaginal mass  Resolved Problems:    * No resolved hospital problems. *      Patient Active Problem List    Diagnosis Date Noted    Hyperkalemia 04/03/2019    Acute renal failure superimposed on stage 4 chronic kidney disease ([address] 04/03/2019    Vaginal mass 04/03/2019    Chronic diastolic CHF (congestive heart failure), NYHA class 3 ([address] 50/81/6886    Metabolic encephalopathy 89/12/2711    Urinary retention 04/01/2019    Hypoxia 04/01/2019    CKD (chronic kidney disease) stage 4, GFR 15-29 ml/min ([facility]) 04/01/2019    Obesity     Seizures ([address]     Chronic renal insufficiency     Pelvic pain 03/31/2019    CKD (chronic kidney disease) stage 3, GFR 30-59 ml/min ([facility] [address] 10/28/2015    Essential hypertension 10/28/2015    Hemorrhagic cerebrovascular accident (CVA) ([facility] [address] 10/28/2015    Cholelithiasis 04/12/2012    Cerebral aneurysm 01/01/1997       PLAN:    Metabolic encephalopathy - improved   Continue to monitor    Vaginal mas - Pelvic pain - vaginal bleeding   Appreciate GYN    Per GYN note: \"highly suspicious for cervical cancer as mass is   obliterating the vaginal canal\"    Obesity    Seizures    Continue home meds    Cerebral aneurysm    Urinary retention   remove jay    Hypoxia - resolved   Nebs   IS     HTN - now with low BPs   Stop vasotec    Acute on CKD  stage 4   May need fluids   Hyperkalemia   kayexcelate   Recheck K    Hypernatremia   monitor    PT/OT  Resolved Problems:    * No resolved hospital problems.  *      · High risk medication: none    LEONARDA Huitron PA-C  4/3/2019, 9:17 AM

## 2019-04-03 NOTE — PROGRESS NOTES
Pt is tearful and requesting something to help her rest. Ativan 1mg IV given at this time. Will continue to monitor.

## 2019-04-04 PROBLEM — G92.9 TOXIC ENCEPHALOPATHY: Status: ACTIVE | Noted: 2019-04-02

## 2019-04-04 PROBLEM — D64.9 ANEMIA: Status: ACTIVE | Noted: 2019-04-04

## 2019-04-04 LAB
ALBUMIN SERPL-MCNC: 2.9 G/DL (ref 3.5–5.2)
ALBUMIN/GLOBULIN RATIO: 1 (ref 1–2.5)
ALP BLD-CCNC: 74 U/L (ref 35–104)
ALT SERPL-CCNC: 13 U/L (ref 5–33)
ANION GAP SERPL CALCULATED.3IONS-SCNC: 11 MMOL/L (ref 9–17)
AST SERPL-CCNC: 12 U/L
BILIRUB SERPL-MCNC: 0.15 MG/DL (ref 0.3–1.2)
BUN BLDV-MCNC: 34 MG/DL (ref 8–23)
BUN/CREAT BLD: 12 (ref 9–20)
CALCIUM SERPL-MCNC: 8.6 MG/DL (ref 8.6–10.4)
CHLORIDE BLD-SCNC: 108 MMOL/L (ref 98–107)
CO2: 24 MMOL/L (ref 20–31)
CREAT SERPL-MCNC: 2.85 MG/DL (ref 0.5–0.9)
FERRITIN: 79 UG/L (ref 13–150)
FOLATE: 8.2 NG/ML
GFR AFRICAN AMERICAN: 20 ML/MIN
GFR NON-AFRICAN AMERICAN: 17 ML/MIN
GFR SERPL CREATININE-BSD FRML MDRD: ABNORMAL ML/MIN/{1.73_M2}
GFR SERPL CREATININE-BSD FRML MDRD: ABNORMAL ML/MIN/{1.73_M2}
GLUCOSE BLD-MCNC: 105 MG/DL (ref 74–100)
GLUCOSE BLD-MCNC: 98 MG/DL (ref 70–99)
GLUCOSE BLD-MCNC: 98 MG/DL (ref 74–100)
GLUCOSE BLD-MCNC: 98 MG/DL (ref 74–100)
HCT VFR BLD CALC: 25.4 % (ref 36.3–47.1)
HCT VFR BLD CALC: 29.1 % (ref 36.3–47.1)
HEMOGLOBIN: 7.5 G/DL (ref 11.9–15.1)
HEMOGLOBIN: 8.7 G/DL (ref 11.9–15.1)
IRON SATURATION: 25 % (ref 20–55)
IRON: 56 UG/DL (ref 37–145)
MCH RBC QN AUTO: 29.2 PG (ref 25.2–33.5)
MCHC RBC AUTO-ENTMCNC: 29.5 G/DL (ref 28.4–34.8)
MCV RBC AUTO: 98.8 FL (ref 82.6–102.9)
NRBC AUTOMATED: 0 PER 100 WBC
PDW BLD-RTO: 13.7 % (ref 11.8–14.4)
PLATELET # BLD: ABNORMAL K/UL (ref 138–453)
PLATELET, FLUORESCENCE: 121 K/UL (ref 138–453)
PLATELET, IMMATURE FRACTION: 3.7 % (ref 1.1–10.3)
PMV BLD AUTO: ABNORMAL FL (ref 8.1–13.5)
POTASSIUM SERPL-SCNC: 3.9 MMOL/L (ref 3.7–5.3)
RBC # BLD: 2.57 M/UL (ref 3.95–5.11)
SODIUM BLD-SCNC: 143 MMOL/L (ref 135–144)
SURGICAL PATHOLOGY REPORT: NORMAL
TOTAL IRON BINDING CAPACITY: 223 UG/DL (ref 250–450)
TOTAL PROTEIN: 5.7 G/DL (ref 6.4–8.3)
UNSATURATED IRON BINDING CAPACITY: 166.8 UG/DL (ref 112–347)
VITAMIN B-12: 378 PG/ML (ref 232–1245)
WBC # BLD: 10.1 K/UL (ref 3.5–11.3)

## 2019-04-04 PROCEDURE — 2580000003 HC RX 258: Performed by: INTERNAL MEDICINE

## 2019-04-04 PROCEDURE — 97116 GAIT TRAINING THERAPY: CPT

## 2019-04-04 PROCEDURE — 86900 BLOOD TYPING SEROLOGIC ABO: CPT

## 2019-04-04 PROCEDURE — 86850 RBC ANTIBODY SCREEN: CPT

## 2019-04-04 PROCEDURE — 1200000000 HC SEMI PRIVATE

## 2019-04-04 PROCEDURE — 85018 HEMOGLOBIN: CPT

## 2019-04-04 PROCEDURE — 85027 COMPLETE CBC AUTOMATED: CPT

## 2019-04-04 PROCEDURE — 97110 THERAPEUTIC EXERCISES: CPT

## 2019-04-04 PROCEDURE — 36415 COLL VENOUS BLD VENIPUNCTURE: CPT

## 2019-04-04 PROCEDURE — 82728 ASSAY OF FERRITIN: CPT

## 2019-04-04 PROCEDURE — P9016 RBC LEUKOCYTES REDUCED: HCPCS

## 2019-04-04 PROCEDURE — 6370000000 HC RX 637 (ALT 250 FOR IP): Performed by: INTERNAL MEDICINE

## 2019-04-04 PROCEDURE — 86901 BLOOD TYPING SEROLOGIC RH(D): CPT

## 2019-04-04 PROCEDURE — 83540 ASSAY OF IRON: CPT

## 2019-04-04 PROCEDURE — 85014 HEMATOCRIT: CPT

## 2019-04-04 PROCEDURE — 83550 IRON BINDING TEST: CPT

## 2019-04-04 PROCEDURE — 2580000003 HC RX 258: Performed by: PHYSICIAN ASSISTANT

## 2019-04-04 PROCEDURE — 80053 COMPREHEN METABOLIC PANEL: CPT

## 2019-04-04 PROCEDURE — 97535 SELF CARE MNGMENT TRAINING: CPT

## 2019-04-04 PROCEDURE — 36430 TRANSFUSION BLD/BLD COMPNT: CPT

## 2019-04-04 PROCEDURE — 85055 RETICULATED PLATELET ASSAY: CPT

## 2019-04-04 PROCEDURE — 82947 ASSAY GLUCOSE BLOOD QUANT: CPT

## 2019-04-04 RX ORDER — 0.9 % SODIUM CHLORIDE 0.9 %
250 INTRAVENOUS SOLUTION INTRAVENOUS ONCE
Status: COMPLETED | OUTPATIENT
Start: 2019-04-04 | End: 2019-04-04

## 2019-04-04 RX ADMIN — Medication 3 MG: at 21:14

## 2019-04-04 RX ADMIN — CARBAMAZEPINE 200 MG: 100 TABLET, EXTENDED RELEASE ORAL at 21:14

## 2019-04-04 RX ADMIN — ASPIRIN 81 MG: 81 TABLET, COATED ORAL at 08:07

## 2019-04-04 RX ADMIN — Medication 10 ML: at 08:08

## 2019-04-04 RX ADMIN — SODIUM CHLORIDE 250 ML: 9 INJECTION, SOLUTION INTRAVENOUS at 13:55

## 2019-04-04 RX ADMIN — Medication 10 ML: at 19:08

## 2019-04-04 RX ADMIN — CARBAMAZEPINE 200 MG: 100 TABLET, EXTENDED RELEASE ORAL at 08:25

## 2019-04-04 ASSESSMENT — PAIN SCALES - GENERAL
PAINLEVEL_OUTOF10: 0
PAINLEVEL_OUTOF10: 0

## 2019-04-04 NOTE — PROGRESS NOTES
Supervisor HEIDI Trammell called to restart an IV on the patient sine a unit of blood has been ordered. Pt has a 24 gauge IV that barely flushed this morning. IV will not be able to handle blood administration. Awaiting on Qwen to restart IV.

## 2019-04-04 NOTE — PROGRESS NOTES
Hospitalist Progress Note    SUBJECTIVE/INTERVAL HISTORY:    Patient seen in follow up for pelvic pain, acute on CKD, CHF, hypoxia, obesity, urinary retention, seizure disorder, h/o cerebral aneurysm, mental status change/toxic encephalopathy. Cr improved. K improved. Hb 7.4. Continues to have vaginal bleeding. Mental status improved. She had pelvic exam, pap and endobiopsy 4/2/19.     OBJECTIVE:    Vitals:   Temp: 97.9 °F (36.6 °C)  BP: (!) 119/54  Resp: 18  Pulse: 70  SpO2: 96 %  24HR INTAKE/OUTPUT:      Intake/Output Summary (Last 24 hours) at 4/4/2019 1018  Last data filed at 4/4/2019 0363  Gross per 24 hour   Intake 810 ml   Output 325 ml   Net 485 ml       -----------------------------------------------------------------  Review of Systems:  Unable to obtain effectively due to baseline aphagia    Exam:  GEN: sleepy, but awakes oriented to person only,  well-developed and well-nourished, in no acute distress  EYES: PERRL  NECK: normal, supple  PULM: Diminished bilaterally - no wheezes, rales or rhonchi, no respiratory distress, on RA  COR: regular rate & rhythm and no murmurs  ABD:  Obese, soft, no tenderness, non-distended, normal bowel sounds  EXT:   edema: 1+ affecting bilateral foot, bilateral ankle  : jay  NEURO: follows commands, POLANCO, expressive aphagia  SKIN:  no rashes or significant lesions    -----------------------------------------------------------------  Diagnostic Data:  Lab Results   Component Value Date    WBC 10.1 04/04/2019    HGB 7.5 (L) 04/04/2019    HCT 25.4 (L) 04/04/2019    PLT See Reflexed IPF Result 04/04/2019    ALT 13 04/04/2019    AST 12 04/04/2019     04/04/2019    K 3.9 04/04/2019     (H) 04/04/2019    CREATININE 2.85 (H) 04/04/2019    BUN 34 (H) 04/04/2019    CO2 24 04/04/2019    INR 1.0 05/13/2017    LABA1C 5.7 01/19/2017       ASSESSMENT:    Principal Problem:    Pelvic pain  Active Problems:    Obesity    Seizures (HCC)    Chronic renal insufficiency Cerebral aneurysm    Urinary retention    Hypoxia    CKD (chronic kidney disease) stage 4, GFR 15-29 ml/min ([facility])    Chronic diastolic CHF (congestive heart failure), NYHA class 3 ([facility])    Toxic encephalopathy    Hyperkalemia    Acute renal failure superimposed on chronic kidney disease (HCC)    Vaginal mass    Abdominal pain    Endometrial disorder    Pelvic mass    Anemia  Resolved Problems:    * No resolved hospital problems. *      Patient Active Problem List    Diagnosis Date Noted    Anemia 04/04/2019    Hyperkalemia 04/03/2019    Acute renal failure superimposed on chronic kidney disease ([address] 04/03/2019    Vaginal mass 04/03/2019    Abdominal pain     Endometrial disorder     Pelvic mass     Chronic diastolic CHF (congestive heart failure), NYHA class 3 (HCC) 04/02/2019    Toxic encephalopathy 04/02/2019    Urinary retention 04/01/2019    Hypoxia 04/01/2019    CKD (chronic kidney disease) stage 4, GFR 15-29 ml/min ([facility]) 04/01/2019    Obesity     Seizures ([address]     Chronic renal insufficiency     Pelvic pain 03/31/2019    CKD (chronic kidney disease) stage 3, GFR 30-59 ml/min ([address] 10/28/2015    Essential hypertension 10/28/2015    Hemorrhagic cerebrovascular accident (CVA) ([address] 10/28/2015    Cholelithiasis 04/12/2012    Cerebral aneurysm 01/01/1997       PLAN:   Toxic encephalopathy - improved   Continue to monitor    Vaginal mass - Pelvic pain - vaginal bleeding   Appreciate GYN    Per GYN note: \"highly suspicious for cervical cancer as   mass is obliterating the vaginal canal\"   Appreciate heme/onc    Needs referral to gyn onc surgeon   Anemia studies   Transfuse 1 unit PRBC    Obesity    Seizures    Continue home meds    Cerebral aneurysm    Urinary retention - resolved    Hypoxia - resolved   Nebs   IS     HTN    Acute on CKD  stage 4   improved   Hyperkalemia - resolved    Hypernatremia   monitor    PT/OT  Resolved Problems:    * No resolved hospital problems.  *      · High risk medication: none    Placement    Rayray Malin PA-C  4/4/2019, 10:18 AM

## 2019-04-04 NOTE — PROGRESS NOTES
Physical Therapy  Facility/Department: [facility] AT THE [facility] MED SURG  Daily Treatment Note  NAME: Keeley Toledo  : 1955  MRN: [id number]    Date of Service: 2019    Discharge Recommendations:  Home with assist PRN        Patient Diagnosis(es): The primary encounter diagnosis was Endometrial disorder. Diagnoses of Abdominal pain, unspecified abdominal location and Acute renal failure superimposed on chronic kidney disease, unspecified CKD stage, unspecified acute renal failure type [facility]) were also pertinent to this visit. has a past medical history of Cerebral aneurysm, Chronic renal insufficiency, Intracranial hemorrhage ([facility] [address] Obesity, Respiratory failure, acute ([facility] [address] and Seizures ([facility] [address] has a past surgical history that includes tracheostomy; Gastrostomy tube placement; and craniotomy. Restrictions  Restrictions/Precautions  Restrictions/Precautions: General Precautions, Fall Risk  Subjective   General  Chart Reviewed: Yes  Response To Previous Treatment: Patient with no complaints from previous session. Family / Caregiver Present: No  Subjective  Subjective: Pt denies pain at this time. Orientation  Orientation  Overall Orientation Status: Within Functional Limits  Cognition      Objective   Bed mobility  Comment: Pt up in chair upon arrival.  Transfers  Sit to Stand: Stand by assistance  Stand to sit: Stand by assistance  Comment: Vc's for proper hand placement. Ambulation  Ambulation?: Yes  Ambulation 1  Surface: level tile  Device: No Device  Assistance: Stand by assistance  Quality of Gait: Waddling gait with decreased B step length due to weakness on R side. Distance: 60ft  Comments: No noted LOB or path deviation. Required vc's for direction due to impaired vision on R side.   Stairs/Curb  Stairs?: No        Exercises  Straight Leg Raise: x15  Quad Sets: x15  Heelslides: x15  Hip Flexion: x15  Hip Abduction: 2x15  Knee Long Arc Quad: x15  Ankle Pumps: 2x15  Comments: B LE ther-ex completed seated and supine with AAROM on R side as needed and tactile cues for tech. Assessment   Body structures, Functions, Activity limitations: Decreased functional mobility ; Decreased endurance;Decreased strength;Decreased safe awareness  Assessment: Pt clement t well with no pain and min rest breaks required. Treatment Diagnosis: general weakness  Prognosis: Good  Patient Education: Pt educated on safety with amb/trans with pt verbalizing appropriate understanding. REQUIRES PT FOLLOW UP: Yes  Activity Tolerance  Activity Tolerance: Patient Tolerated treatment well     Goals  Short term goals  Time Frame for Short term goals: 10 days  Short term goal 1: Pt to ambulate 150ft without AD with no LOB. Short term goal 2: Pt to demonstrate good standing balance for decrease fall risk. Short term goal 3: Pt to complete all bed mob, transfers, and gait independently. Short term goal 4: Pt to tolerate 20-30 mins ther ex/act for improved strength and enduance with ADL's. Patient Goals   Patient goals : Home following discharge    Plan    Plan  Times per week: 7 x week  Times per day: Twice a day  Current Treatment Recommendations: Strengthening, Gait Training, Patient/Caregiver Education & Training, IADL Training, Stair training, Balance Training, Neuromuscular Re-education, Functional Mobility Training, Endurance Training, Home Exercise Program, Transfer Training  Safety Devices  Type of devices:  All fall risk precautions in place, Call light within reach, Left in chair, Nurse notified, Chair alarm in place     Therapy Time   Individual Concurrent Group Co-treatment   Time In 0703         Time Out 0730         Minutes [address] [place] [address]

## 2019-04-04 NOTE — PROGRESS NOTES
Patient stated her discharge plan is still home with no services. Patient again stated her friend takes her to all of her doctors appointments.   RUBIO Alba

## 2019-04-04 NOTE — PROGRESS NOTES
Occupational Therapy  Facility/Department: [facility] AT THE [facility] MED SURG  Daily Treatment Note  NAME: Sariak Walker  : 1955  MRN: [id number]    Date of Service: 2019    Discharge Recommendations:  Continue to assess pending progress       Assessment      Safety Devices  Safety Devices in place: Yes  Type of devices: Call light within reach; Left in chair;Chair alarm in place         Patient Diagnosis(es): The primary encounter diagnosis was Endometrial disorder. Diagnoses of Abdominal pain, unspecified abdominal location, Acute renal failure superimposed on chronic kidney disease, unspecified CKD stage, unspecified acute renal failure type ([address] and Pelvic pain were also pertinent to this visit. has a past medical history of Cerebral aneurysm, Chronic renal insufficiency, Intracranial hemorrhage ([address] Obesity, Respiratory failure, acute ([address] and Seizures ([address] has a past surgical history that includes tracheostomy; Gastrostomy tube placement; and craniotomy. Restrictions  Restrictions/Precautions  Restrictions/Precautions: General Precautions, Fall Risk  Subjective   General  Chart Reviewed: Yes  Patient assessed for rehabilitation services?: Yes  Response to previous treatment: Patient with no complaints from previous session  Family / Caregiver Present: No  Subjective  Subjective: Pt denies pain this date. Pt requires max encouragement for therapeutic participation. Orientation     Objective    ADL  Additional Comments: Educated on discharge folder        Functional Mobility  Functional - Mobility Device: No device  Assist Level: Stand by assistance  Functional Mobility Comments: Pt completed functional mobility throughout hallways with SBA sans device with max encouragement required for participation and 2 verbal cues for safe transfer techniques.                                                                        Plan   Plan  Times per week: 7x/wek  Times per day: Daily  Current Treatment Recommendations: Strengthening, Endurance Training, Patient/Caregiver Education & Training, Self-Care / ADL, Safety Education & Training, Functional Mobility Training  G-Code     OutComes Score                                                  AM-PAC Score             Goals  Short term goals  Time Frame for Short term goals: 10 days  Short term goal 1: Pt will complete LB dressing/bathing c SUP with use of AE to increase independence and safety with ADL. Short term goal 2: Pt will tolerate standing for 8 minutes while engaging in therapeutic activity of choice with no LOB to increase safety for functional transfers and ADL. Short term goal 3: Pt will engage in 15 minutes of therex/theract to increase BUE strength for increased independence with ADL and functional transfers. Short term goal 4: Pt will complete grooming and oral hygiene routine while standing sinkside with SUP to increase independence for ADL engagement.        Therapy Time   Individual Concurrent Group Co-treatment   Time In 1310         Time Out 1342         Minutes [facility] [facility]

## 2019-04-04 NOTE — PROGRESS NOTES
Writer noted small amount of bright red blood in toilet with small clots after patient used the restroom. Will continue to monitor.

## 2019-04-04 NOTE — PLAN OF CARE
Problem: Risk for Impaired Skin Integrity  Goal: Tissue integrity - skin and mucous membranes  Description  Structural intactness and normal physiological function of skin and  mucous membranes. Outcome: Ongoing  Note:   No signs of skin breakdown at this time. Patient able to turn and reposition self in bed and chair. Will continue to monitor. Problem: Falls - Risk of:  Goal: Will remain free from falls  Description  Will remain free from falls  Outcome: Ongoing  Note:   Bed in low position. 2/4 siderails up. Bed alarm on. Fall risk sign posted. Non-slip socks on. Call light within reach, patient uses call light appropriately. Problem: Pain:  Goal: Pain level will decrease  Description  Pain level will decrease  Outcome: Ongoing  Note:   Patient does not report any pain at this time. Will continue to monitor.

## 2019-04-04 NOTE — PLAN OF CARE
Problem: Risk for Impaired Skin Integrity  Goal: Tissue integrity - skin and mucous membranes  Description  Structural intactness and normal physiological function of skin and  mucous membranes. 4/4/2019 1118 by Víctor Carpenter RN  Outcome: Ongoing  Note:   Blayne scale monitoring per protocol. Inspect skin for breakdown frequently. Encourage pt to make frequent large adjustments in position or assist patient with turning. Document all areas of breakdown. Problem: Falls - Risk of:  Goal: Will remain free from falls  Description  Will remain free from falls  4/4/2019 1118 by Víctor Carpenter RN  Outcome: Ongoing  Note:   Call light in reach at all times, frequent checks, bed in lowest position, wheels of bed and chair locked, non skid footwear on, appropriate transfer techniques, personal items within reach, walkways free of obstructions, fall risk armband and sign displayed, [place] fall risk score per protocol. No falls this shift, will continue to monitor. Problem: Pain:  Goal: Pain level will decrease  Description  Pain level will decrease  4/4/2019 1118 by Víctor Carpenter RN  Outcome: Ongoing  Note:   Pain assessed every four hours and as needed using 0-10 pain scale. Pt educated on scale and uses scale appropriately. Encourage pt to notify staff of pain before pain becomes uncontrollable. Correlate periods of heavy activity after pain medication administration.  Use pharmacological and non pharmacological methods for pain relief such as: warm blankets, ice, television, reading, or rest.

## 2019-04-04 NOTE — PROGRESS NOTES
Physical Therapy  Facility/Department: [facility] AT THE [facility]  Daily Treatment Note  NAME: Jonas Sung  : 1955  MRN: [id number]    Date of Service: 2019    Discharge Recommendations:  Home with assist PRN        Patient Diagnosis(es): The primary encounter diagnosis was Endometrial disorder. Diagnoses of Abdominal pain, unspecified abdominal location, Acute renal failure superimposed on chronic kidney disease, unspecified CKD stage, unspecified acute renal failure type ([address] and Pelvic pain were also pertinent to this visit. has a past medical history of Cerebral aneurysm, Chronic renal insufficiency, Intracranial hemorrhage ([address] Obesity, Respiratory failure, acute ([address] and Seizures ([address] has a past surgical history that includes tracheostomy; Gastrostomy tube placement; and craniotomy. Restrictions  Restrictions/Precautions  Restrictions/Precautions: General Precautions, Fall Risk  Subjective   General  Chart Reviewed: Yes  Response To Previous Treatment: Patient with no complaints from previous session. Family / Caregiver Present: No  Subjective  Subjective: Pt denies pain, however reports she is tired. Orientation  Orientation  Overall Orientation Status: Within Functional Limits  Cognition      Objective   Bed mobility  Comment: Pt up in chair upon arrival.  Transfers  Sit to Stand: Stand by assistance  Stand to sit: Stand by assistance  Comment: Pt refused to transfer due to c/o being tired. Ambulation  Ambulation?: No  Ambulation 1  Surface: level tile  Device: No Device  Assistance: Stand by assistance  Quality of Gait: Waddling gait with decreased B step length due to weakness on R side. Distance: 60ft  Comments: Pt refused to amb due to c/o being tired and daughter coming to visit during therapy session.   Stairs/Curb  Stairs?: No        Exercises  Straight Leg Raise: x15  Quad Sets: x15  Heelslides: x15  Hip Flexion: x15  Hip Abduction: 2x15  Knee Long Arc Quad: x15  Knee Short Arc Quad: x15  Ankle Pumps: 2x15  Comments: DARYL DAMON ther-ex completed seated and supine with AAROM on R side as needed and tactile cues for tech. Assessment   Body structures, Functions, Activity limitations: Decreased functional mobility ; Decreased endurance;Decreased strength;Decreased safe awareness  Assessment: Pt refused to amb/trans this tx session due to reports of feeling \"very tired\", however pt was agreeable to perform seated and reclined ther-ex. Spoke with nurse and pt will be recieving a blood transfusion soon. Pt is pleasant and presents with expressive aphasis. Treatment Diagnosis: general weakness  Prognosis: Good  Patient Education: Pt educated on importance and benefits of therapy with good verbalizied understanding. REQUIRES PT FOLLOW UP: Yes  Activity Tolerance  Activity Tolerance: Patient Tolerated treatment well     Goals  Short term goals  Time Frame for Short term goals: 10 days  Short term goal 1: Pt to ambulate 150ft without AD with no LOB. Short term goal 2: Pt to demonstrate good standing balance for decrease fall risk. Short term goal 3: Pt to complete all bed mob, transfers, and gait independently. Short term goal 4: Pt to tolerate 20-30 mins ther ex/act for improved strength and enduance with ADL's. Patient Goals   Patient goals : Home following discharge    Plan    Plan  Times per week: 7 x week  Times per day: Twice a day  Current Treatment Recommendations: Strengthening, Gait Training, Patient/Caregiver Education & Training, IADL Training, Stair training, Balance Training, Neuromuscular Re-education, Functional Mobility Training, Endurance Training, Home Exercise Program, Transfer Training  Safety Devices  Type of devices:  All fall risk precautions in place, Call light within reach, Left in chair, Nurse notified, Chair alarm in place     Therapy Time   Individual Concurrent Group Co-treatment   Time In 1138         Time Out 1153         Minutes 15 Tami Perrin, PTA

## 2019-04-05 VITALS
DIASTOLIC BLOOD PRESSURE: 68 MMHG | TEMPERATURE: 97.9 F | HEART RATE: 62 BPM | SYSTOLIC BLOOD PRESSURE: 142 MMHG | WEIGHT: 227.5 LBS | OXYGEN SATURATION: 92 % | HEIGHT: 64 IN | RESPIRATION RATE: 16 BRPM | BODY MASS INDEX: 38.84 KG/M2

## 2019-04-05 PROBLEM — D50.0 ANEMIA DUE TO BLOOD LOSS: Status: ACTIVE | Noted: 2019-04-04

## 2019-04-05 LAB
ABO/RH: NORMAL
ALBUMIN SERPL-MCNC: 3.1 G/DL (ref 3.5–5.2)
ALBUMIN/GLOBULIN RATIO: 1 (ref 1–2.5)
ALP BLD-CCNC: 78 U/L (ref 35–104)
ALT SERPL-CCNC: 27 U/L (ref 5–33)
ANION GAP SERPL CALCULATED.3IONS-SCNC: 12 MMOL/L (ref 9–17)
ANTIBODY SCREEN: NEGATIVE
ARM BAND NUMBER: NORMAL
AST SERPL-CCNC: 27 U/L
BILIRUB SERPL-MCNC: 0.25 MG/DL (ref 0.3–1.2)
BLD PROD TYP BPU: NORMAL
BUN BLDV-MCNC: 36 MG/DL (ref 8–23)
BUN/CREAT BLD: 13 (ref 9–20)
CALCIUM SERPL-MCNC: 9 MG/DL (ref 8.6–10.4)
CHLORIDE BLD-SCNC: 110 MMOL/L (ref 98–107)
CO2: 22 MMOL/L (ref 20–31)
CREAT SERPL-MCNC: 2.79 MG/DL (ref 0.5–0.9)
CROSSMATCH RESULT: NORMAL
DISPENSE STATUS BLOOD BANK: NORMAL
EXPIRATION DATE: NORMAL
GFR AFRICAN AMERICAN: 21 ML/MIN
GFR NON-AFRICAN AMERICAN: 17 ML/MIN
GFR SERPL CREATININE-BSD FRML MDRD: ABNORMAL ML/MIN/{1.73_M2}
GFR SERPL CREATININE-BSD FRML MDRD: ABNORMAL ML/MIN/{1.73_M2}
GLUCOSE BLD-MCNC: 101 MG/DL (ref 74–100)
GLUCOSE BLD-MCNC: 104 MG/DL (ref 74–100)
GLUCOSE BLD-MCNC: 145 MG/DL (ref 74–100)
GLUCOSE BLD-MCNC: 93 MG/DL (ref 70–99)
HCT VFR BLD CALC: 26.2 % (ref 36.3–47.1)
HCT VFR BLD CALC: 28.4 % (ref 36.3–47.1)
HEMOGLOBIN: 8.1 G/DL (ref 11.9–15.1)
HEMOGLOBIN: 8.7 G/DL (ref 11.9–15.1)
MCH RBC QN AUTO: 29.6 PG (ref 25.2–33.5)
MCHC RBC AUTO-ENTMCNC: 30.6 G/DL (ref 28.4–34.8)
MCV RBC AUTO: 96.6 FL (ref 82.6–102.9)
NRBC AUTOMATED: 0 PER 100 WBC
PDW BLD-RTO: 14.9 % (ref 11.8–14.4)
PLATELET # BLD: ABNORMAL K/UL (ref 138–453)
PLATELET, FLUORESCENCE: 133 K/UL (ref 138–453)
PLATELET, IMMATURE FRACTION: 3.5 % (ref 1.1–10.3)
PMV BLD AUTO: ABNORMAL FL (ref 8.1–13.5)
POTASSIUM SERPL-SCNC: 3.9 MMOL/L (ref 3.7–5.3)
RBC # BLD: 2.94 M/UL (ref 3.95–5.11)
SODIUM BLD-SCNC: 144 MMOL/L (ref 135–144)
TOTAL PROTEIN: 6.2 G/DL (ref 6.4–8.3)
TRANSFUSION STATUS: NORMAL
UNIT DIVISION: 0
UNIT NUMBER: NORMAL
WBC # BLD: 8.9 K/UL (ref 3.5–11.3)

## 2019-04-05 PROCEDURE — 85027 COMPLETE CBC AUTOMATED: CPT

## 2019-04-05 PROCEDURE — 80053 COMPREHEN METABOLIC PANEL: CPT

## 2019-04-05 PROCEDURE — 82947 ASSAY GLUCOSE BLOOD QUANT: CPT

## 2019-04-05 PROCEDURE — 85018 HEMOGLOBIN: CPT

## 2019-04-05 PROCEDURE — 85014 HEMATOCRIT: CPT

## 2019-04-05 PROCEDURE — 97116 GAIT TRAINING THERAPY: CPT

## 2019-04-05 PROCEDURE — 97110 THERAPEUTIC EXERCISES: CPT

## 2019-04-05 PROCEDURE — 36415 COLL VENOUS BLD VENIPUNCTURE: CPT

## 2019-04-05 RX ORDER — CARBAMAZEPINE 100 MG/1
200 TABLET, EXTENDED RELEASE ORAL 2 TIMES DAILY
Qty: 60 TABLET | Refills: 0 | Status: ON HOLD
Start: 2019-04-05 | End: 2021-01-01 | Stop reason: HOSPADM

## 2019-04-05 NOTE — DISCHARGE SUMMARY
Transabdominal pelvic ultrasound was performed. Patient refused endovaginal exam. COMPARISON: CT abdomen and pelvis dated 03/29/2019 HISTORY: ORDERING SYSTEM PROVIDED HISTORY: Pelvic pain/abnormal CT scan of pelvis Abnormal CT appearance of the uterus. Pelvic pain. FINDINGS: Measurements: Uterus:  16.9 x 6.5 x 4.7 cm Endometrial stripe:  4.4 cm, although limited Right Ovary:  Not measured Left Ovary:  Not measured Ultrasound Findings: Uterus: Visibility is limited due to transabdominal imaging and nondistended urinary bladder due to Clements catheterization. Body habitus also limits evaluation. The uterus is bulky in size. Myometrial echotexture is heterogeneous. The endometrial/myometrial interface is ill-defined. Endometrial stripe: As above, imaging is limited. The endometrial tissue appears to be heterogeneous with areas of an echogenicity suggesting fluid. Right Ovary: Right ovary was not seen. Left Ovary:  Left ovary was not seen. Free Fluid: No evidence of free fluid. 1.  Significantly limited study due to transabdominal imaging, nondistended urinary bladder, and body habitus. Patient refused endovaginal imaging. 2.  Thickened and heterogeneous endometrial tissue, which could be related to endometrial carcinoma, polyp, or hyperplasia. Alternatively, this could be complex fluid in the uterine cavity secondary to a mass at the cervix. Correlation with physical exam and tissue sampling is recommended. If necessary, additional evaluation could be provided with pelvic MRI. 3.  Ovaries were not seen.      Vl Dup Lower Extremity Venous Bilateral    Result Date: 4/1/2019    [facility]  Vascular Lower Extremities DVT Study Procedure   Patient Name  [facility]      Date of Study           04/01/2019                Brooke Ran   Date of Birth 1955   Gender                  Female   Age           70 year(s)   Race                       Room Number   0315         Height:                 64.17 inch, 163 cm   Corporate ID  [id number]   Weight:                 231 pounds, 104.8 kg  #   Patient Acct  [de-identified]    BSA:        2.08 m^2    BMI:       39.44 kg/m^2  #   MR #          [id number]       [address] [place] [address] DEEJAY Au   Accession #   [id number]  Interpreting Physician  Cynthia Ibrahim MD   Referring                  Referring Physician     Talon Lopez PA-C  Nurse  Practitioner  Procedure Type of Study:   Veins: Lower Extremities DVT Study, Venous Scan Lower Bilateral.  Patient Status: In Patient. Technical Quality:Adequate visualization. Comments:INDICATIONS: SOB Shortness of breath  Conclusions   Summary   No evidence of superficial or deep venous thrombosis in both lower  extremities. Signature   ----------------------------------------------------------------  Electronically signed by DEEJAY Nevarez(Sonographer) on  04/01/2019 02:20 PM  ----------------------------------------------------------------   ----------------------------------------------------------------  Electronically signed by Cynthia Ibrahim MD(Interpreting  physician) on 04/01/2019 02:25 PM  ----------------------------------------------------------------  Findings:   Right Impression:                    Left Impression:  The common femoral, femoral, deep    The common femoral, femoral, deep  femoral, popliteal, tibials,         femoral, popliteal, tibials,  peroneal, and saphenous veins were   peroneal, and saphenous veins were  evaluated. evaluated. All veins were compressible with     All veins were compressible with  normal doppler responses. normal doppler responses. Velocities are measured in cm/s ; Diameters are measured in cm Right Lower Extremities DVT Study Measurements Right 2D Measurements +------------------------------------+----------+---------------+----------+ ! Location                            ! Visualized! Compressibility! Thrombosis! +------------------------------------+----------+---------------+----------+ ! Common Femoral                      !Yes       ! Yes            ! None      ! +------------------------------------+----------+---------------+----------+ ! Prox Femoral                        !Yes       ! Yes            ! None      ! +------------------------------------+----------+---------------+----------+ ! Mid Femoral                         !Yes       ! Yes            ! None      ! +------------------------------------+----------+---------------+----------+ ! Dist Femoral                        !Yes       ! Yes            ! None      ! +------------------------------------+----------+---------------+----------+ ! Deep Femoral                        !Yes       ! Yes            ! None      ! +------------------------------------+----------+---------------+----------+ ! Popliteal                           !Yes       ! Yes            ! None      ! +------------------------------------+----------+---------------+----------+ ! Sapheno Femoral Junction            ! Yes       ! Yes            ! None      ! +------------------------------------+----------+---------------+----------+ ! PTV                                 ! Yes       ! Yes            ! None      ! +------------------------------------+----------+---------------+----------+ ! Peroneal                            !Yes       ! Yes            ! None      ! +------------------------------------+----------+---------------+----------+ ! Gastroc                             ! Yes       ! Yes            ! None      ! +------------------------------------+----------+---------------+----------+ ! GSV Thigh                           ! Yes       ! Yes            ! None      ! +------------------------------------+----------+---------------+----------+ ! GSV Knee                            ! Yes       ! Yes            ! None      ! +------------------------------------+----------+---------------+----------+ ! GSV Ankle !Yes       !Yes            ! None      ! +------------------------------------+----------+---------------+----------+ ! SSV                                 ! Yes       ! Yes            ! None      ! +------------------------------------+----------+---------------+----------+ Right Doppler Measurements +---------------------------+------+------+--------------------------------+ ! Location                   ! Signal!Reflux! Reflux (msec)                   ! +---------------------------+------+------+--------------------------------+ ! Common Femoral             !Phasic! No    !                                ! +---------------------------+------+------+--------------------------------+ ! Prox Femoral               !Phasic! No    !                                ! +---------------------------+------+------+--------------------------------+ ! Popliteal                  !Phasic! No    !                                ! +---------------------------+------+------+--------------------------------+ Left Lower Extremities DVT Study Measurements Left 2D Measurements +------------------------------------+----------+---------------+----------+ ! Location                            ! Visualized! Compressibility! Thrombosis! +------------------------------------+----------+---------------+----------+ ! Common Femoral                      !Yes       ! Yes            ! None      ! +------------------------------------+----------+---------------+----------+ ! Prox Femoral                        !Yes       ! Yes            ! None      ! +------------------------------------+----------+---------------+----------+ ! Mid Femoral                         !Yes       ! Yes            ! None      ! +------------------------------------+----------+---------------+----------+ ! Dist Femoral                        !Yes       ! Yes            ! None      ! +------------------------------------+----------+---------------+----------+ ! Deep Femoral !Phasic! No    !                                ! +---------------------------+------+------+--------------------------------+ ! Popliteal                  !Phasic! No    !                                ! +---------------------------+------+------+--------------------------------+      Discharge Diagnoses:    Principal Problem:    Pelvic pain  Active Problems:    Obesity    Seizures ([facility])    Chronic renal insufficiency    Cerebral aneurysm    Urinary retention    Hypoxia    CKD (chronic kidney disease) stage 4, GFR 15-29 ml/min ([facility])    Chronic diastolic CHF (congestive heart failure), NYHA class 3 ([facility])    Toxic encephalopathy    Hyperkalemia    Acute renal failure superimposed on chronic kidney disease ([facility])    Vaginal mass    Abdominal pain    Endometrial disorder    Pelvic mass    Anemia due to blood loss  Resolved Problems:    * No resolved hospital problems. *      Active Hospital Problems    Diagnosis Date Noted    Anemia due to blood loss [D50.0] 04/04/2019    Hyperkalemia [E87.5] 04/03/2019    Acute renal failure superimposed on chronic kidney disease ([facility] [N17.9, N18.9] 04/03/2019    Vaginal mass [N89.9] 04/03/2019    Abdominal pain [R10.9]     Endometrial disorder [N94.9]     Pelvic mass [R19.00]     Chronic diastolic CHF (congestive heart failure), NYHA class 3 ([facility]) [I50.32] 04/02/2019    Toxic encephalopathy [G92] 04/02/2019    Urinary retention [R33.9] 04/01/2019    Hypoxia [R09.02] 04/01/2019    CKD (chronic kidney disease) stage 4, GFR 15-29 ml/min ([facility] [N18.4] 04/01/2019    Obesity [E66.9]     Seizures ([facility] [R56.9]     Chronic renal insufficiency [N18.9]     Pelvic pain [R10.2] 03/31/2019    Cerebral aneurysm [I67.1] 01/01/1997       Discharge Medications:       Oleg Luis Felipe   Home Medication Instructions VKR:[id number]    Printed on:04/05/19 0955   Medication Information                      aspirin 81 MG EC tablet  Take 81 mg by mouth daily.                DAGO ALK-PHENOBARBITAL PO  Take  by mouth.               carBAMazepine (TEGRETOL-XR) 100 MG extended release tablet  Take 2 tablets by mouth 2 times daily             enalapril (VASOTEC) 2.5 MG tablet  Take 2.5 mg by mouth daily. hydrocodone-acetaminophen (VICODIN) 5-500 MG per tablet  Take 1 tablet by mouth every 6 hours as needed. Melatonin 3 MG TABS  Take 3 mg by mouth nightly. metformin (GLUCOPHAGE) 500 MG tablet  Take 500 mg by mouth daily (with breakfast). vitamin D (CHOLECALCIFEROL) 25362 UNIT CAPS  Take 1 capsule by mouth once a week for 8 doses And then one cap every month for the next 4 months                 Patient Instructions:    Activity: activity as tolerated  Diet: diabetic diet  Wound Care: none needed  Other: CBC/CMP 4/8/19    Disposition:   Discharge to Home    Follow up:  Patient will be followed by Sugar Mitchell DO in 1 week  Dr. Melvin Landaverde office to call her with appt    CORE MEASURES on Discharge (if applicable)  ACE/ARB in CHF: NA  Statin in MI: NA  ASA in MI: NA  Statin in CVA: NA  Antiplatelet in CVA: NA    Total time spent on discharge services: 35 minutes    Including the following activities:  Evaluation and Management of patient  Discussion with patient and/or surrogate about current care plan  Coordination with Case Management and/or   Coordination of care with Consultants (if applicable)   Coordination of care with Receiving Facility Physician (if applicable)  Completion of DME forms (if applicable)  Preparation of Discharge Summary  Preparation of Medication Reconciliation  Preparation of Discharge Prescriptions    Signed:  Gwen Wheeler PA-C  4/5/2019, 9:55 AM

## 2019-04-05 NOTE — PROGRESS NOTES
Balance  Posture: Fair  Sitting - Static: Good  Sitting - Dynamic: Good;-  Standing - Static: Good  Standing - Dynamic: Fair;+  Exercises  Hip Flexion: x15  Hip Abduction: 15x  Knee Long Arc Quad: 15x  Ankle Pumps: 15x  Comments: Above exercises completed in sitting. Frequent cues for participation/technique. Assessment      Patient Education: Educated pt on safety with transfers/amb and on importance of participating in physical therapy. Pt with fair understanding. REQUIRES PT FOLLOW UP: Yes  Activity Tolerance  Activity Tolerance: Patient Tolerated treatment well     G-Code     OutComes Score    AM-PAC Score    Goals  Short term goals  Time Frame for Short term goals: 10 days  Short term goal 1: Pt to ambulate 150ft without AD with no LOB. Short term goal 2: Pt to demonstrate good standing balance for decrease fall risk. Short term goal 3: Pt to complete all bed mob, transfers, and gait independently. Short term goal 4: Pt to tolerate 20-30 mins ther ex/act for improved strength and enduance with ADL's. Patient Goals   Patient goals : Home following discharge    Plan    Plan  Times per week: 7 x week  Times per day: Twice a day  Current Treatment Recommendations: Strengthening, Gait Training, Patient/Caregiver Education & Training, IADL Training, Stair training, Balance Training, Neuromuscular Re-education, Functional Mobility Training, Endurance Training, Home Exercise Program, Transfer Training  Safety Devices  Type of devices:  All fall risk precautions in place, Call light within reach, Left in chair, Nurse notified, Chair alarm in place, Gait belt     Therapy Time   Individual Concurrent Group Co-treatment   Time In 0942         Time Out 1005         Minutes [address] [place] [address]

## 2019-04-05 NOTE — PROGRESS NOTES
Patiens SpO2 88-90% on finger, room air upon writer taking vital signs. Writer put paient on 2L O2 nasal cannula. SpO2 steady at 94% finger at this time. Will continue to monitor.

## 2019-04-05 NOTE — PROGRESS NOTES
Hospitalist Progress Note    SUBJECTIVE/INTERVAL HISTORY:    Patient seen in follow up for pelvic pain, acute on CKD, CHF, hypoxia, obesity, urinary retention, seizure disorder, h/o cerebral aneurysm, mental status change/toxic encephalopathy, blood loss anemia. Cr improved. Continues to have vaginal bleeding. Mental status at baseline. Hb 8.7 and stable after PRBC transfusion. Did well with PT. She had pelvic exam, pap and endobiopsy 4/2/19. - Path inconclusive - blood     OBJECTIVE:    Vitals:   Temp: 98.2 °F (36.8 °C)  BP: (!) 121/56  Resp: 18  Pulse: 58  SpO2: 93 %  24HR INTAKE/OUTPUT:      Intake/Output Summary (Last 24 hours) at 4/5/2019 0937  Last data filed at 4/5/2019 0453  Gross per 24 hour   Intake 1386.67 ml   Output --   Net 1386.67 ml       -----------------------------------------------------------------  Review of Systems:  Constitutional:negative  for fevers, and negative for chills. Eyes: negative for visual disturbance   ENT: negative for sore throat, negative nasal congestion, and negative for earache  Respiratory: negative for shortness of breath, negative for cough, and negative for wheezing  Cardiovascular: negative for chest pain, negative for palpitations, and negative for syncope  Gastrointestinal: negative for abdominal pain, negative for nausea,negative for vomiting, negative for diarrhea, negative for constipation, and negative for hematochezia or melena  Genitourinary: negative for dysuria, negative for urinary urgency, negative for urinary frequency, and negative for hematuria  Gyn: + vaginal bleeding  Skin: negative for skin rash, and negative for skin lesions  Neurological: negative for unilateral weakness, numbness or tingling.         Exam:  GEN:  alert and oriented to person, place and time, well-developed and well-nourished, in no acute distress  EYES:  PERRL  NECK: normal, supple  PULM: Diminished bilaterally - no wheezes, no rales, no rhonchi, no respiratory distress, on RA  COR:   regular rate & rhythm and no murmurs  ABD:    Obese, soft, non-tender, non-distended, normal bowel sounds, no masses or organomegaly  EXT:    edema: trace affecting bilateral foot, bilateral ankle  NEURO: follows commands, POLANCO, expressive aphagia  SKIN:   no rashes or significant lesions    -----------------------------------------------------------------  Diagnostic Data:  Lab Results   Component Value Date    WBC 8.9 04/05/2019    HGB 8.7 (L) 04/05/2019    HCT 28.4 (L) 04/05/2019    PLT See Reflexed IPF Result 04/05/2019    ALT 27 04/05/2019    AST 27 04/05/2019     04/05/2019    K 3.9 04/05/2019     (H) 04/05/2019    CREATININE 2.79 (H) 04/05/2019    BUN 36 (H) 04/05/2019    CO2 22 04/05/2019    INR 1.0 05/13/2017    LABA1C 5.7 01/19/2017       ASSESSMENT:    Principal Problem:    Pelvic pain  Active Problems:    Obesity    Seizures ([facility])    Chronic renal insufficiency    Cerebral aneurysm    Urinary retention    Hypoxia    CKD (chronic kidney disease) stage 4, GFR 15-29 ml/min ([facility])    Chronic diastolic CHF (congestive heart failure), NYHA class 3 ([facility])    Toxic encephalopathy    Hyperkalemia    Acute renal failure superimposed on chronic kidney disease ([facility])    Vaginal mass    Abdominal pain    Endometrial disorder    Pelvic mass    Anemia due to blood loss  Resolved Problems:    * No resolved hospital problems.  *      Patient Active Problem List    Diagnosis Date Noted    Anemia due to blood loss 04/04/2019    Hyperkalemia 04/03/2019    Acute renal failure superimposed on chronic kidney disease ([address] 04/03/2019    Vaginal mass 04/03/2019    Abdominal pain     Endometrial disorder     Pelvic mass     Chronic diastolic CHF (congestive heart failure), NYHA class 3 ([address] 04/02/2019    Toxic encephalopathy 04/02/2019    Urinary retention 04/01/2019    Hypoxia 04/01/2019    CKD (chronic kidney disease) stage 4, GFR 15-29 ml/min ([facility]) 04/01/2019    Obesity     Seizures ([address]    

## 2019-04-05 NOTE — PLAN OF CARE
Problem: Falls - Risk of:  Goal: Will remain free from falls  Description  Will remain free from falls  4/4/2019 2234 by Ramirez Bob RN  Outcome: Ongoing  Note:   Bed in low position. 2/4 siderails up. Fall risk sign posted. Non-slip socks on. Call light within reach. Patient alert and oriented but often forgets to call for help when getting up. Will continue to monitor. Problem: Pain:  Goal: Pain level will decrease  Description  Pain level will decrease  4/4/2019 2234 by Ramirez Bob RN  Outcome: Ongoing  Note:   Patient reports no pain at this time. Will continue to monitor.

## 2019-04-05 NOTE — PROGRESS NOTES
She has been evaluated by GYN. Samples obtained and sent to pathology were inconclusive due to amount of blood in sample. She was seen by hematology who recommend referral to [address] [place] [address]. She was given 1 unit PRBC for anemia and Hb on discharge 8.7. She does continue to have vaginal bleeding. Her mental status is a baseline after an instance of toxic encephalopathy [facility] secondary to patient taking her own meds in addition to meds we provided. She also had an acute on chronic kidney injury, which is improving. Discussed case with Dr. Ayden Mac. Hb stable after transfusion. Cr improved. Referral placed to Dr. Yaima Olivo for surgery. Patient had hypoxia during admission and is now off of O2. Will get CBC and CMP 4/8/19 to follow blood loss anemia and her acute on chronic kidney injury. Follow closely with PCP. She ambulated hallway and ate well. Will discharge to home. Dr. Ayden Mac okay with plan to discharge.     Josefina Whitehead PA-C  4/5/2019, 9:43 AM

## 2019-04-08 ENCOUNTER — HOSPITAL ENCOUNTER (OUTPATIENT)
Age: 64
Setting detail: OBSERVATION
Discharge: HOME | End: 2019-04-09
Attending: EMERGENCY MEDICINE | Admitting: INTERNAL MEDICINE
Payer: MEDICARE

## 2019-04-08 DIAGNOSIS — D64.9 ANEMIA, UNSPECIFIED TYPE: ICD-10-CM

## 2019-04-08 DIAGNOSIS — D50.0 ANEMIA DUE TO BLOOD LOSS: ICD-10-CM

## 2019-04-08 DIAGNOSIS — N93.9 VAGINAL BLEEDING: Primary | ICD-10-CM

## 2019-04-08 LAB
% FREE CARBAMAZEPINE: 24 % (ref 8–35)
ABSOLUTE EOS #: 0.35 K/UL (ref 0–0.44)
ABSOLUTE IMMATURE GRANULOCYTE: 0.07 K/UL (ref 0–0.3)
ABSOLUTE LYMPH #: 1.44 K/UL (ref 1.1–3.7)
ABSOLUTE MONO #: 0.78 K/UL (ref 0.1–1.2)
ANION GAP SERPL CALCULATED.3IONS-SCNC: 13 MMOL/L (ref 9–17)
BASOPHILS # BLD: 1 % (ref 0–2)
BASOPHILS ABSOLUTE: 0.05 K/UL (ref 0–0.2)
BUN BLDV-MCNC: 35 MG/DL (ref 8–23)
BUN/CREAT BLD: 12 (ref 9–20)
CALCIUM SERPL-MCNC: 9.3 MG/DL (ref 8.6–10.4)
CARBAMAZEPINE, ADMINISTRATION: NORMAL
CARBAMAZEPINE, FREE: 1.9 UG/ML (ref 1–3)
CARBAMAZEPINE, FREQUENCY: NORMAL
CARBAMAZEPINE, TOTAL: 7.9 UG/ML (ref 4–12)
CHLORIDE BLD-SCNC: 108 MMOL/L (ref 98–107)
CO2: 22 MMOL/L (ref 20–31)
CREAT SERPL-MCNC: 2.9 MG/DL (ref 0.5–0.9)
DIFFERENTIAL TYPE: ABNORMAL
EOSINOPHILS RELATIVE PERCENT: 4 % (ref 1–4)
FREE CARB DOSE INFO: NORMAL
FREE CARB DRAW TIME: NORMAL
GFR AFRICAN AMERICAN: 20 ML/MIN
GFR NON-AFRICAN AMERICAN: 16 ML/MIN
GFR SERPL CREATININE-BSD FRML MDRD: ABNORMAL ML/MIN/{1.73_M2}
GFR SERPL CREATININE-BSD FRML MDRD: ABNORMAL ML/MIN/{1.73_M2}
GLUCOSE BLD-MCNC: 95 MG/DL (ref 70–99)
HCT VFR BLD CALC: 23.5 % (ref 36.3–47.1)
HCT VFR BLD CALC: 26.2 % (ref 36.3–47.1)
HEMOGLOBIN: 7 G/DL (ref 11.9–15.1)
HEMOGLOBIN: 7.7 G/DL (ref 11.9–15.1)
IMMATURE GRANULOCYTES: 1 %
LYMPHOCYTES # BLD: 15 % (ref 24–43)
MCH RBC QN AUTO: 29.2 PG (ref 25.2–33.5)
MCH RBC QN AUTO: 29.4 PG (ref 25.2–33.5)
MCHC RBC AUTO-ENTMCNC: 29.4 G/DL (ref 28.4–34.8)
MCHC RBC AUTO-ENTMCNC: 29.8 G/DL (ref 28.4–34.8)
MCV RBC AUTO: 98.7 FL (ref 82.6–102.9)
MCV RBC AUTO: 99.2 FL (ref 82.6–102.9)
MONOCYTES # BLD: 8 % (ref 3–12)
NRBC AUTOMATED: 0 PER 100 WBC
NRBC AUTOMATED: 0 PER 100 WBC
PDW BLD-RTO: 14.6 % (ref 11.8–14.4)
PDW BLD-RTO: 14.7 % (ref 11.8–14.4)
PLATELET # BLD: 145 K/UL (ref 138–453)
PLATELET # BLD: 154 K/UL (ref 138–453)
PLATELET ESTIMATE: ABNORMAL
PMV BLD AUTO: 10.7 FL (ref 8.1–13.5)
PMV BLD AUTO: 10.8 FL (ref 8.1–13.5)
POTASSIUM SERPL-SCNC: 4.6 MMOL/L (ref 3.7–5.3)
RBC # BLD: 2.38 M/UL (ref 3.95–5.11)
RBC # BLD: 2.64 M/UL (ref 3.95–5.11)
RBC # BLD: ABNORMAL 10*6/UL
SEG NEUTROPHILS: 71 % (ref 36–65)
SEGMENTED NEUTROPHILS ABSOLUTE COUNT: 7.27 K/UL (ref 1.5–8.1)
SODIUM BLD-SCNC: 143 MMOL/L (ref 135–144)
WBC # BLD: 10 K/UL (ref 3.5–11.3)
WBC # BLD: 9 K/UL (ref 3.5–11.3)
WBC # BLD: ABNORMAL 10*3/UL

## 2019-04-08 PROCEDURE — 2580000003 HC RX 258: Performed by: INTERNAL MEDICINE

## 2019-04-08 PROCEDURE — 80048 BASIC METABOLIC PNL TOTAL CA: CPT

## 2019-04-08 PROCEDURE — P9016 RBC LEUKOCYTES REDUCED: HCPCS

## 2019-04-08 PROCEDURE — 86850 RBC ANTIBODY SCREEN: CPT

## 2019-04-08 PROCEDURE — 36430 TRANSFUSION BLD/BLD COMPNT: CPT

## 2019-04-08 PROCEDURE — 85027 COMPLETE CBC AUTOMATED: CPT

## 2019-04-08 PROCEDURE — 99284 EMERGENCY DEPT VISIT MOD MDM: CPT

## 2019-04-08 PROCEDURE — G0378 HOSPITAL OBSERVATION PER HR: HCPCS

## 2019-04-08 PROCEDURE — 6370000000 HC RX 637 (ALT 250 FOR IP): Performed by: INTERNAL MEDICINE

## 2019-04-08 PROCEDURE — 85025 COMPLETE CBC W/AUTO DIFF WBC: CPT

## 2019-04-08 PROCEDURE — 36415 COLL VENOUS BLD VENIPUNCTURE: CPT

## 2019-04-08 PROCEDURE — 86901 BLOOD TYPING SEROLOGIC RH(D): CPT

## 2019-04-08 PROCEDURE — 86900 BLOOD TYPING SEROLOGIC ABO: CPT

## 2019-04-08 RX ORDER — 0.9 % SODIUM CHLORIDE 0.9 %
250 INTRAVENOUS SOLUTION INTRAVENOUS ONCE
Status: COMPLETED | OUTPATIENT
Start: 2019-04-08 | End: 2019-04-08

## 2019-04-08 RX ORDER — HYDROCODONE BITARTRATE AND ACETAMINOPHEN 5; 325 MG/1; MG/1
1 TABLET ORAL EVERY 6 HOURS PRN
Status: DISCONTINUED | OUTPATIENT
Start: 2019-04-08 | End: 2019-04-09 | Stop reason: HOSPADM

## 2019-04-08 RX ORDER — UREA 10 %
3 LOTION (ML) TOPICAL NIGHTLY
Status: DISCONTINUED | OUTPATIENT
Start: 2019-04-08 | End: 2019-04-09 | Stop reason: HOSPADM

## 2019-04-08 RX ORDER — CARBAMAZEPINE 100 MG/1
200 TABLET, EXTENDED RELEASE ORAL 2 TIMES DAILY
Status: DISCONTINUED | OUTPATIENT
Start: 2019-04-08 | End: 2019-04-09 | Stop reason: HOSPADM

## 2019-04-08 RX ORDER — FAMOTIDINE 20 MG/1
20 TABLET, FILM COATED ORAL 2 TIMES DAILY
Status: DISCONTINUED | OUTPATIENT
Start: 2019-04-08 | End: 2019-04-09 | Stop reason: HOSPADM

## 2019-04-08 RX ORDER — SODIUM CHLORIDE 0.9 % (FLUSH) 0.9 %
10 SYRINGE (ML) INJECTION PRN
Status: DISCONTINUED | OUTPATIENT
Start: 2019-04-08 | End: 2019-04-09 | Stop reason: HOSPADM

## 2019-04-08 RX ORDER — SODIUM CHLORIDE 0.9 % (FLUSH) 0.9 %
10 SYRINGE (ML) INJECTION EVERY 12 HOURS SCHEDULED
Status: DISCONTINUED | OUTPATIENT
Start: 2019-04-08 | End: 2019-04-09 | Stop reason: HOSPADM

## 2019-04-08 RX ORDER — ENALAPRIL MALEATE 2.5 MG/1
2.5 TABLET ORAL DAILY
Status: DISCONTINUED | OUTPATIENT
Start: 2019-04-08 | End: 2019-04-09 | Stop reason: HOSPADM

## 2019-04-08 RX ADMIN — Medication 10 ML: at 21:39

## 2019-04-08 RX ADMIN — CARBAMAZEPINE 200 MG: 100 TABLET, EXTENDED RELEASE ORAL at 22:34

## 2019-04-08 RX ADMIN — SODIUM CHLORIDE 250 ML: 9 INJECTION, SOLUTION INTRAVENOUS at 21:38

## 2019-04-08 RX ADMIN — ENALAPRIL MALEATE 1.25 MG: 2.5 TABLET ORAL at 22:35

## 2019-04-08 ASSESSMENT — ENCOUNTER SYMPTOMS
ABDOMINAL PAIN: 0
FACIAL SWELLING: 0
PHOTOPHOBIA: 0
CHEST TIGHTNESS: 0
WHEEZING: 0
TROUBLE SWALLOWING: 0
BACK PAIN: 0
SORE THROAT: 0
VOICE CHANGE: 0
SHORTNESS OF BREATH: 0
DIARRHEA: 0
BLOOD IN STOOL: 0
COUGH: 0
NAUSEA: 0
VOMITING: 0

## 2019-04-08 NOTE — ED NOTES
Per pt, daughter to be contacted if needed. Her name is Kruger Form at [phone].      [place] RN  04/08/19 0237

## 2019-04-08 NOTE — ED PROVIDER NOTES
[facility] AT THE [facility] MED SURG  EMERGENCY DEPARTMENT     Pt Name: Irma Agrawal  MRN: [id number]  [place] 1955  Date of evaluation: 4/8/2019  Provider: Leny Lam DO, [address]       Chief Complaint   Patient presents with    Vaginal Bleeding       HISTORY OF PRESENT ILLNESS    Irma Agrawal is a 70 y.o. female who presents to the emergency department from home with complaints of vaginal bleeding. Patient has known malignancy, although unclear if this is uterine or cervical at this point. She has seen oncology who is referred her for a biopsy. It doesn't appear that by CSP and done quite yet. The patient does have a history of stroke with expressive aphasia, therefore the history is somewhat difficult, however it appears that she is concerned about worsening bleeding. The bleeding has been episodic. She uses large pads to contain the bleeding, but is unclear how many she soaks through a day as she is unable to tell me. She is scheduled to see GYN oncology in [place] and the appointment at some point this week. She seems to only be concerned about the large amount of bleeding at this point. Pain is no worse than before. No vomiting, no fevers. Some lightheadedness with ambulation but no syncope      Triage notes and Nursing notes were reviewed by myself. Any discrepancies are addressed above.     PAST MEDICAL HISTORY     Past Medical History:   Diagnosis Date    Cerebral aneurysm 1997    CHF (congestive heart failure) (HCC)     Chronic renal insufficiency     [facility]acranial hemorrhage [facility] 1997    Obesity     Respiratory failure, acute ([address] 1997    Seizures ([address]     no seizure since before 2002       SURGICAL HISTORY       Past Surgical History:   Procedure Laterality Date    CRANIOTOMY      microdissection    GASTROSTOMY TUBE PLACEMENT      TRACHEOSTOMY         CURRENT MEDICATIONS       Discharge Medication List as of 4/9/2019 12:15 PM      CONTINUE these medications which have NOT CHANGED    Details   carBAMazepine (TEGRETOL-XR) 100 MG extended release tablet Take 2 tablets by mouth 2 times daily, Disp-60 tablet, R-0Home dosingNO PRINT      enalapril (VASOTEC) 2.5 MG tablet Take 2.5 mg by mouth daily. Melatonin 3 MG TABS Take 3 mg by mouth nightly. aspirin 81 MG EC tablet Take 81 mg by mouth daily. hydrocodone-acetaminophen (VICODIN) 5-500 MG per tablet Take 1 tablet by mouth every 6 hours as needed. metformin (GLUCOPHAGE) 500 MG tablet Take 500 mg by mouth daily (with breakfast). vitamin D (CHOLECALCIFEROL) 31766 UNIT CAPS Take 1 capsule by mouth once a week for 8 doses And then one cap every month for the next 4 months, Disp-12 capsule, R-0      BELLADONNA ALK-PHENOBARBITAL PO Take  by mouth. ALLERGIES     Pcn [penicillins]    FAMILY HISTORY     History reviewed. No pertinent family history.      SOCIAL HISTORY       Social History     Socioeconomic History    Marital status:      Spouse name: None    Number of children: None    Years of education: None    Highest education level: None   Occupational History    None   Social Needs    Financial resource strain: None    Food insecurity:     Worry: None     Inability: None    Transportation needs:     Medical: None     Non-medical: None   Tobacco Use    Smoking status: Former Smoker     Packs/day: 1.00     Years: 15.00     Pack years: 15.00     Types: Cigarettes    Smokeless tobacco: Never Used    Tobacco comment: quit 30 yrs ago   Substance and Sexual Activity    Alcohol use: No    Drug use: None    Sexual activity: None   Lifestyle    Physical activity:     Days per week: None     Minutes per session: None    Stress: None   Relationships    Social connections:     Talks on phone: None     Gets together: None     Attends [employer] service: None     Active member of club or organization: None     Attends meetings of clubs or organizations: None     Relationship status: None    Intimate partner violence:     Fear of current or ex partner: None     Emotionally abused: None     Physically abused: None     Forced sexual activity: None   Other Topics Concern    None   Social History Narrative    None       REVIEW OF SYSTEMS     Review of Systems   Constitutional: Negative for chills, diaphoresis and fever. HENT: Negative for facial swelling, sore throat, trouble swallowing and voice change. Eyes: Negative for photophobia and visual disturbance. Respiratory: Negative for cough, chest tightness, shortness of breath and wheezing. Cardiovascular: Negative for chest pain, palpitations and leg swelling. Gastrointestinal: Negative for abdominal pain, blood in stool, diarrhea, nausea and vomiting. Endocrine: Negative for polydipsia and polyuria. Genitourinary: Positive for vaginal bleeding. Negative for dysuria, frequency, hematuria and urgency. Musculoskeletal: Negative for back pain, neck pain and neck stiffness. Skin: Negative for pallor and rash. Neurological: Negative for seizures, speech difficulty, weakness, numbness and headaches. Hematological: Does not bruise/bleed easily. Psychiatric/Behavioral: Negative for confusion. The patient is not nervous/anxious. Except as noted above the remainder of the review of systems was reviewed and is. PHYSICAL EXAM    (up to 7 for level 4, 8 or more for level 5)     ED Triage Vitals [04/08/19 1224]   BP Temp Temp Source Pulse Resp SpO2 Height Weight   124/63 97.8 °F (36.6 °C) Tympanic 73 16 94 % -- --       Physical Exam   Constitutional: She is oriented to person, place, and time. She appears well-developed and well-nourished. HENT:   Head: Normocephalic. Eyes: Pupils are equal, round, and reactive to light. Conjunctivae and EOM are normal.   Neck: Neck supple. No tracheal deviation present. Pulmonary/Chest: Effort normal.   No respiratory distress or tachypnea   Abdominal: She exhibits no mass. There is no tenderness. There is no guarding. Musculoskeletal: Normal range of motion. Neurological: She is alert and oriented to person, place, and time. No cranial nerve deficit. Skin: Skin is warm and dry. Nursing note and vitals reviewed.       DIAGNOSTIC RESULTS     EKG:(none if blank)  All E[facility]'s are [facility] who either signs or Co-signs this chart in the absence of a cardiologist.        RADIOLOGY: (none if blank)   Interpretation per the Radiologistbelow, if available at the time of this note:    No orders to display       LABS:  Labs Reviewed   CBC WITH AUTO DIFFERENTIAL - Abnormal; Notable for the following components:       Result Value    RBC 2.64 (*)     Hemoglobin 7.7 (*)     Hematocrit 26.2 (*)     RDW 14.6 (*)     Seg Neutrophils 71 (*)     Lymphocytes 15 (*)     Immature Granulocytes 1 (*)     All other components within normal limits   BASIC METABOLIC PANEL W/ REFLEX TO MG FOR LOW K - Abnormal; Notable for the following components:    BUN 35 (*)     CREATININE 2.90 (*)     Chloride 108 (*)     GFR Non- 16 (*)     GFR  20 (*)     All other components within normal limits   CBC - Abnormal; Notable for the following components:    RBC 2.38 (*)     Hemoglobin 7.0 (*)     Hematocrit 23.5 (*)     RDW 14.7 (*)     All other components within normal limits   CBC WITH AUTO DIFFERENTIAL - Abnormal; Notable for the following components:    RBC 2.82 (*)     Hemoglobin 8.3 (*)     Hematocrit 26.8 (*)     RDW 14.7 (*)     Seg Neutrophils 70 (*)     Lymphocytes 17 (*)     Immature Granulocytes 1 (*)     All other components within normal limits   HEMOGLOBIN AND HEMATOCRIT, BLOOD - Abnormal; Notable for the following components:    Hemoglobin 8.5 (*)     Hematocrit 27.8 (*)     All other components within normal limits   TYPE AND SCREEN   PREPARE RBC (CROSSMATCH)       All other labs were within normal range or not returned as of this dictation. EMERGENCY DEPARTMENT COURSE andMedical Decision Making:     MDM/  ED Course as of Apr 12 2034   Mon Apr 08, 2019   1425 Discussed w/ Dr Mariela Fuentes, who advises discharge with follow up in the office on Wednesday and will recheck Hgb in the office and will transfuse at that time if needed. Does not want to transfuse her currently    [AB]      ED Course User Index  [AB] Abimael Woodward, DO     Hgb dropped on recheck; no active hemorrhage at this time. Pt will be admitted for further workup and likely transufsion    ED Medications administered this visit:    Medications   0.9 % sodium chloride infusion 250 mL (250 mLs Intravenous New Bag 4/8/19 2138)       CONSULTS: (None if blank)  IP CONSULT TO CASE MANAGEMENT    Procedures: (None if blank)       CLINICAL       1. Vaginal bleeding    2. Anemia due to blood loss    3.  Anemia, unspecified type          DISPOSITION/PLAN   DISPOSITION Admitted 04/08/2019 05:43:47 PM      PATIENT REFERRED TO:  Harinder Ortega, [address] [facility] [address],[facility]  [phone]      keep appointment already scheduled for 4/12/19    Hugo Felton MD  [address]  [address]    Go on 4/17/2019  at 9:00 AM        DISCHARGE MEDICATIONS:  Discharge Medication List as of 4/9/2019 12:15 PM                 (Please note that portions of this note were completed with a voice recognition program.  Efforts were made to edit the dictations but occasionallywords are mis-transcribed.)      Saad Gan DO,FACEP (electronically signed)  Attending Emergency Department Provider          Marisa Vieira DO  04/12/19 2033       Marisa Vieira DO  04/12/19 2034

## 2019-04-09 VITALS
SYSTOLIC BLOOD PRESSURE: 122 MMHG | HEART RATE: 66 BPM | HEIGHT: 58 IN | WEIGHT: 228.2 LBS | TEMPERATURE: 98.2 F | BODY MASS INDEX: 47.9 KG/M2 | OXYGEN SATURATION: 96 % | DIASTOLIC BLOOD PRESSURE: 60 MMHG | RESPIRATION RATE: 16 BRPM

## 2019-04-09 LAB
ABSOLUTE EOS #: 0.33 K/UL (ref 0–0.44)
ABSOLUTE IMMATURE GRANULOCYTE: 0.05 K/UL (ref 0–0.3)
ABSOLUTE LYMPH #: 1.44 K/UL (ref 1.1–3.7)
ABSOLUTE MONO #: 0.71 K/UL (ref 0.1–1.2)
BASOPHILS # BLD: 0 % (ref 0–2)
BASOPHILS ABSOLUTE: 0.03 K/UL (ref 0–0.2)
DIFFERENTIAL TYPE: ABNORMAL
EOSINOPHILS RELATIVE PERCENT: 4 % (ref 1–4)
HCT VFR BLD CALC: 26.8 % (ref 36.3–47.1)
HCT VFR BLD CALC: 27.8 % (ref 36.3–47.1)
HEMOGLOBIN: 8.3 G/DL (ref 11.9–15.1)
HEMOGLOBIN: 8.5 G/DL (ref 11.9–15.1)
IMMATURE GRANULOCYTES: 1 %
LYMPHOCYTES # BLD: 17 % (ref 24–43)
MCH RBC QN AUTO: 29.4 PG (ref 25.2–33.5)
MCHC RBC AUTO-ENTMCNC: 31 G/DL (ref 28.4–34.8)
MCV RBC AUTO: 95 FL (ref 82.6–102.9)
MONOCYTES # BLD: 8 % (ref 3–12)
NRBC AUTOMATED: 0 PER 100 WBC
PDW BLD-RTO: 14.7 % (ref 11.8–14.4)
PLATELET # BLD: 145 K/UL (ref 138–453)
PLATELET ESTIMATE: ABNORMAL
PMV BLD AUTO: 10.7 FL (ref 8.1–13.5)
RBC # BLD: 2.82 M/UL (ref 3.95–5.11)
RBC # BLD: ABNORMAL 10*6/UL
SEG NEUTROPHILS: 70 % (ref 36–65)
SEGMENTED NEUTROPHILS ABSOLUTE COUNT: 5.93 K/UL (ref 1.5–8.1)
WBC # BLD: 8.5 K/UL (ref 3.5–11.3)
WBC # BLD: ABNORMAL 10*3/UL

## 2019-04-09 PROCEDURE — 6370000000 HC RX 637 (ALT 250 FOR IP): Performed by: INTERNAL MEDICINE

## 2019-04-09 PROCEDURE — G0378 HOSPITAL OBSERVATION PER HR: HCPCS

## 2019-04-09 PROCEDURE — 85014 HEMATOCRIT: CPT

## 2019-04-09 PROCEDURE — 36415 COLL VENOUS BLD VENIPUNCTURE: CPT

## 2019-04-09 PROCEDURE — 85018 HEMOGLOBIN: CPT

## 2019-04-09 PROCEDURE — 85025 COMPLETE CBC W/AUTO DIFF WBC: CPT

## 2019-04-09 RX ADMIN — ENALAPRIL MALEATE 2.5 MG: 2.5 TABLET ORAL at 08:30

## 2019-04-09 RX ADMIN — FAMOTIDINE 20 MG: 20 TABLET ORAL at 08:30

## 2019-04-09 RX ADMIN — CARBAMAZEPINE 200 MG: 100 TABLET, EXTENDED RELEASE ORAL at 08:30

## 2019-04-09 RX ADMIN — METFORMIN HYDROCHLORIDE 500 MG: 500 TABLET ORAL at 08:30

## 2019-04-09 NOTE — H&P
Provider, MD   Melatonin 3 MG TABS Take 3 mg by mouth nightly. Yes Historical Provider, MD   aspirin 81 MG EC tablet Take 81 mg by mouth daily. Yes Historical Provider, MD   hydrocodone-acetaminophen (VICODIN) 5-500 MG per tablet Take 1 tablet by mouth every 6 hours as needed. Yes Historical Provider, MD   metformin (GLUCOPHAGE) 500 MG tablet Take 500 mg by mouth daily (with breakfast). Yes Historical Provider, MD   vitamin D (CHOLECALCIFEROL) 24337 UNIT CAPS Take 1 capsule by mouth once a week for 8 doses And then one cap every month for the next 4 months 11/12/15 1/1/16  MD DAGO Jernigan-PHENOBARBITAL PO Take  by mouth. Historical Provider, MD       Review of Systems:  Constitutional:negative  for fevers, and negative for chills. Eyes: negative for visual disturbance   ENT: negative for sore throat, negative nasal congestion, and negative for earache  Respiratory: negative for shortness of breath, negative for cough, and negative for wheezing  Cardiovascular: negative for chest pain, negative for palpitations, and negative for syncope  Gastrointestinal: negative for abdominal pain, negative for nausea,negative for vomiting, negative for diarrhea, negative for constipation, and negative for hematochezia or melena  Genitourinary: negative for dysuria, negative for urinary urgency, negative for urinary frequency, and negative for hematuria  Skin: negative for skin rash, and negative for skin lesions  Neurological: negative for unilateral weakness, numbness or tingling.     Physical Exam:    Vitals:   Temp: 98.1 °F (36.7 °C)  BP: (!) 113/58  Resp: 16  Pulse: 60  SpO2: 94 %  24HR INTAKE/OUTPUT:      Intake/Output Summary (Last 24 hours) at 4/9/2019 0834  Last data filed at 4/9/2019 0035  Gross per 24 hour   Intake 391 ml   Output 300 ml   Net 91 ml       Exam:  GEN:  alert and oriented to person, place and time, well-developed and well-nourished, in no acute distress  EYES:  PERRL  NECK: normal, supple  PULM: Diminished bilaterally - no wheezes, no rales, no rhonchi, no respiratory distress, on RA  COR:   regular rate & rhythm and no murmurs  ABD:    Obese, soft, non-tender, non-distended, normal bowel sounds, no masses or organomegaly  EXT:    edema: trace affecting bilateral foot, bilateral ankle, bilateral calf  NEURO: follows commands, POLANCO, expressive aphagia  SKIN:   no rashes    -----------------------------------------------------------------  Diagnostic Data:   Lab Results   Component Value Date    WBC 8.5 04/09/2019    HGB 8.3 (L) 04/09/2019     04/09/2019       Lab Results   Component Value Date    BUN 35 (H) 04/08/2019    CREATININE 2.90 (H) 04/08/2019     04/08/2019    K 4.6 04/08/2019    CALCIUM 9.3 04/08/2019     (H) 04/08/2019    CO2 22 04/08/2019    LABGLOM 16 (L) 04/08/2019       Lab Results   Component Value Date    WBCUA 0 TO 2 03/31/2019    RBCUA None 03/31/2019    EPITHUA 0 TO 2 03/31/2019    LEUKOCYTESUR NEGATIVE 03/31/2019    SPECGRAV 1.015 03/31/2019    GLUCOSEU NEGATIVE 03/31/2019    KETUA NEGATIVE 03/31/2019    PROTEINU 1+ (A) 03/31/2019    HGBUR NEGATIVE 03/31/2019    CASTUA NOT REPORTED 03/31/2019    CRYSTUA NOT REPORTED 03/31/2019    BACTERIA NOT REPORTED 03/31/2019    YEAST NOT REPORTED 03/31/2019       Lab Results   Component Value Date    MYOGLOBIN 52 05/31/2018    TROPONINT <0.03 05/31/2018    CKTOTAL 54 05/13/2017    CKMB 1.9 05/31/2018       No results found. Assessment:    Active Problems:    Cerebral aneurysm    CKD (chronic kidney disease) stage 4, GFR 15-29 ml/min ([facility])    Chronic diastolic CHF (congestive heart failure), NYHA class 3 ([facility])    Vaginal mass    Anemia  Resolved Problems:    * No resolved hospital problems.  *      Patient Active Problem List    Diagnosis Date Noted    Anemia 04/08/2019    Anemia due to blood loss 04/04/2019    Hyperkalemia 04/03/2019    Acute renal failure superimposed on

## 2019-04-09 NOTE — DISCHARGE SUMMARY
Discharge Summary    Bridgette Simon  :  1955  MRN:  [id number]    Admit date:  2019      Discharge date: 2019     Admitting Physician:  Zachary Romero MD    Consultants:  none    Procedures: none    Complications: none    Discharge Condition: stable    Hospital Course:   Bridgette Simon is a 70 y.o. female admitted with continued vaginal bleeding. No dizziness, syncope or lightheadedness. NO weakness. Known vaginal mass from recent admission. Needs to see GYN/ONC surgery - referral placed prior to recent discharge, but no appointment yet. Patient was concerned about the bleeding and came to ER. She is aware that she will continue to bleed until she has a surgical procedure. Hb did drop to 7.0 and the ER provider wanted her admitted for transfusion. She was admitted for blood loss anemia. She had transfusion and Hb 8.3 morning of discharge. She will be discharged to home with CBC 19. Appointment with Dr. Jolie Beard next week.     Exam:  GEN:  alert and oriented to person, place and time, well-developed and well-nourished, in no acute distress  EYES:  PERRL  NECK: normal, supple  PULM: Diminished bilaterally - no wheezes, no rales, no rhonchi, no respiratory distress, on RA  COR:   regular rate & rhythm and no murmurs  ABD:    Obese, soft, non-tender, non-distended, normal bowel sounds, no masses or organomegaly  EXT:    edema: trace affecting bilateral foot, bilateral ankle, bilateral calf  NEURO: follows commands, POLANCO, expressive aphagia  SKIN:   no rashes        Significant Diagnostic Studies:   Lab Results   Component Value Date    WBC 8.5 2019    HGB 8.3 (L) 2019     2019       Lab Results   Component Value Date    BUN 35 (H) 2019    CREATININE 2.90 (H) 2019     2019    K 4.6 2019    CALCIUM 9.3 2019     (H) 2019    CO2 22 2019    LABGLOM 16 (L) 2019       Lab Results   Component Value Date    WBCUA 0 TO 2 03/31/2019    RBCUA None 03/31/2019    EPITHUA 0 TO 2 03/31/2019    LEUKOCYTESUR NEGATIVE 03/31/2019    SPECGRAV 1.015 03/31/2019    GLUCOSEU NEGATIVE 03/31/2019    KETUA NEGATIVE 03/31/2019    PROTEINU 1+ (A) 03/31/2019    HGBUR NEGATIVE 03/31/2019    CASTUA NOT REPORTED 03/31/2019    CRYSTUA NOT REPORTED 03/31/2019    BACTERIA NOT REPORTED 03/31/2019    YEAST NOT REPORTED 03/31/2019       No results found. Discharge Diagnoses: Active Problems:    Cerebral aneurysm    CKD (chronic kidney disease) stage 4, GFR 15-29 ml/min ([facility])    Chronic diastolic CHF (congestive heart failure), NYHA class 3 ([facility])    Vaginal mass    Anemia  Resolved Problems:    * No resolved hospital problems. *      Active Hospital Problems    Diagnosis Date Noted    Anemia [D64.9] 04/08/2019    Vaginal mass [N89.9] 04/03/2019    Chronic diastolic CHF (congestive heart failure), NYHA class 3 (HCC) [I50.32] 04/02/2019    CKD (chronic kidney disease) stage 4, [facility] [N18.4] 04/01/2019    Cerebral aneurysm [I67.1] 01/01/1997       Discharge Medications:       Nydia Vergara   Home Medication Instructions WHE:[id number]    Printed on:04/09/19 7952   Medication Information                      aspirin 81 MG EC tablet  Take 81 mg by mouth daily. BELLADONNA ALK-PHENOBARBITAL PO  Take  by mouth.               carBAMazepine (TEGRETOL-XR) 100 MG extended release tablet  Take 2 tablets by mouth 2 times daily             enalapril (VASOTEC) 2.5 MG tablet  Take 2.5 mg by mouth daily. hydrocodone-acetaminophen (VICODIN) 5-500 MG per tablet  Take 1 tablet by mouth every 6 hours as needed. Melatonin 3 MG TABS  Take 3 mg by mouth nightly. metformin (GLUCOPHAGE) 500 MG tablet  Take 500 mg by mouth daily (with breakfast).                vitamin D (CHOLECALCIFEROL) 94726 UNIT CAPS  Take 1 capsule by mouth once a week for 8 doses And then one cap every month for the next 4 months Patient Instructions:    Activity: activity as tolerated  Diet: regular diet  Wound Care: none needed  Other: CBC 4/11/19    Disposition:   Discharge to Home    Follow up:  Patient will be followed by Niesha Nolen DO in 1 week  Nephrology tomorrow  Dr. Farzaneh Galvan next week    CORE MEASURES on Discharge (if applicable)  ACE/ARB in CHF: NA  Statin in MI: NA  ASA in MI: NA  Statin in CVA: NA  Antiplatelet in CVA: NA    Total time spent on discharge services: 45 minutes    Including the following activities:  Evaluation and Management of patient  Discussion with patient and/or surrogate about current care plan  Coordination with Case Management and/or   Coordination of care with Consultants (if applicable)   Coordination of care with Receiving Facility Physician (if applicable)  Completion of DME forms (if applicable)  Preparation of Discharge Summary  Preparation of Medication Reconciliation  Preparation of Discharge Prescriptions    Signed:  Kenn Galvan PA-C  4/9/2019, 10:57 AM

## 2019-04-09 NOTE — CARE COORDINATION
Called Dr Karan Dale office (gynecology/oncology) for office appointment. Had sent Pt Info for the doctor review on 4/5/19 after hospital stay here last week. Appointment made for 4/17/19 at 9:00 AM.   Entered info into discharge instructions.   / KATE Cho

## 2019-04-09 NOTE — PROGRESS NOTES
Discussed discharge plans with the patient and daughter. Patient is a 70year old female here with anemia from bleeding vaginal mass. She is alert and oriented. Patient is  and lives at home with her . She uses no DME. Patient does her own cleaning and cooks frozen meals. She manages her own medications and family/friends do the driving. Her PCP is Dr. Montez Cortez. She has medical insurance that helps with medications costs. The discharge plan is home with no services. She does not have advance directives. LSW to monitor and assist with any needs or concerns as they arise.     RUBIO Calabrese

## 2019-04-09 NOTE — PLAN OF CARE
Problem: Falls - Risk of:  Goal: Will remain free from falls  Description  Will remain free from falls  Outcome: Ongoing  Note:   Patient is alert and oriented and has demonstrated the use of using the call light for assistance before getting up. Education has been provided to defer the use of the bed alarm and/or restraint free alarm as the patient has shown competency in calling for assistance and verbalizing the risk. Problem: Risk for Impaired Skin Integrity  Goal: Tissue integrity - skin and mucous membranes  Description  Structural intactness and normal physiological function of skin and  mucous membranes. Outcome: Ongoing  Note:   Skin assessment performed, no breakdown noted at this time. Patient skin is dry and intact. Will continue to monitor for redness or breakdown.

## 2019-04-09 NOTE — PROGRESS NOTES
Pt arrived to floor via bed and requested to be slid over to bed in room. Writer asked patient if she could stand and pivot to bed with assistance. Pt was able to stand, walk to bathroom, and return to bed without difficulty and no hands-on assistance. Pt's daughter and son at bedside. Pt's daughter states that pt is suppose to see Dr Janis Haney in [place] for follow-up from previous hospital stay, but has not yet. Pt has appointment with PCP, Dr Tanika Muse, for Friday 4/12. Pt and family updated on plan of care. Will inquire day nurse about making follow-up appointment with Dr Jada Carrizales for patient.

## 2019-04-09 NOTE — PROGRESS NOTES
Patient is agitated. Wants to leave. PAtient's daughter requesting we not release patient until she is contacted, as she is the patient's ride. Patient wants to walk home. Able to redirect patient. Once dtr called, this writer updated her on discharge instructions. She is sending her sister to  patient.

## 2019-04-11 ENCOUNTER — HOSPITAL ENCOUNTER (OUTPATIENT)
Age: 64
Discharge: HOME | End: 2019-04-11
Payer: MEDICARE

## 2019-04-11 ENCOUNTER — HOSPITAL ENCOUNTER (OUTPATIENT)
Age: 64
End: 2019-04-11
Payer: MEDICARE

## 2019-04-11 DIAGNOSIS — D50.0 ANEMIA DUE TO BLOOD LOSS: ICD-10-CM

## 2019-04-11 DIAGNOSIS — N93.9 VAGINAL BLEEDING: ICD-10-CM

## 2019-04-11 LAB
ABO/RH: NORMAL
ABSOLUTE EOS #: 0.37 K/UL (ref 0–0.44)
ABSOLUTE IMMATURE GRANULOCYTE: 0.08 K/UL (ref 0–0.3)
ABSOLUTE LYMPH #: 1.66 K/UL (ref 1.1–3.7)
ABSOLUTE MONO #: 0.87 K/UL (ref 0.1–1.2)
ANTIBODY SCREEN: NEGATIVE
ARM BAND NUMBER: NORMAL
BASOPHILS # BLD: 1 % (ref 0–2)
BASOPHILS ABSOLUTE: 0.07 K/UL (ref 0–0.2)
BLD PROD TYP BPU: NORMAL
CROSSMATCH RESULT: NORMAL
DIFFERENTIAL TYPE: ABNORMAL
DISPENSE STATUS BLOOD BANK: NORMAL
EOSINOPHILS RELATIVE PERCENT: 4 % (ref 1–4)
EXPIRATION DATE: NORMAL
HCT VFR BLD CALC: 30.2 % (ref 36.3–47.1)
HEMOGLOBIN: 9 G/DL (ref 11.9–15.1)
IMMATURE GRANULOCYTES: 1 %
LYMPHOCYTES # BLD: 16 % (ref 24–43)
MCH RBC QN AUTO: 29.2 PG (ref 25.2–33.5)
MCHC RBC AUTO-ENTMCNC: 29.8 G/DL (ref 28.4–34.8)
MCV RBC AUTO: 98.1 FL (ref 82.6–102.9)
MONOCYTES # BLD: 8 % (ref 3–12)
NRBC AUTOMATED: 0 PER 100 WBC
PDW BLD-RTO: 15.2 % (ref 11.8–14.4)
PLATELET # BLD: 185 K/UL (ref 138–453)
PLATELET ESTIMATE: ABNORMAL
PMV BLD AUTO: 10.8 FL (ref 8.1–13.5)
RBC # BLD: 3.08 M/UL (ref 3.95–5.11)
RBC # BLD: ABNORMAL 10*6/UL
SEG NEUTROPHILS: 70 % (ref 36–65)
SEGMENTED NEUTROPHILS ABSOLUTE COUNT: 7.47 K/UL (ref 1.5–8.1)
TRANSFUSION STATUS: NORMAL
UNIT DIVISION: 0
UNIT NUMBER: NORMAL
WBC # BLD: 10.5 K/UL (ref 3.5–11.3)
WBC # BLD: ABNORMAL 10*3/UL

## 2019-04-11 PROCEDURE — 36415 COLL VENOUS BLD VENIPUNCTURE: CPT

## 2019-04-11 PROCEDURE — 85025 COMPLETE CBC W/AUTO DIFF WBC: CPT

## 2019-04-16 ENCOUNTER — HOSPITAL ENCOUNTER (OUTPATIENT)
Age: 64
Discharge: HOME | End: 2019-04-16
Payer: MEDICARE

## 2019-04-16 LAB
ABSOLUTE EOS #: 0.4 K/UL (ref 0–0.44)
ABSOLUTE IMMATURE GRANULOCYTE: 0.04 K/UL (ref 0–0.3)
ABSOLUTE LYMPH #: 1.51 K/UL (ref 1.1–3.7)
ABSOLUTE MONO #: 0.66 K/UL (ref 0.1–1.2)
BASOPHILS # BLD: 1 % (ref 0–2)
BASOPHILS ABSOLUTE: 0.07 K/UL (ref 0–0.2)
DIFFERENTIAL TYPE: ABNORMAL
EOSINOPHILS RELATIVE PERCENT: 5 % (ref 1–4)
HCT VFR BLD CALC: 27.1 % (ref 36.3–47.1)
HEMOGLOBIN: 8 G/DL (ref 11.9–15.1)
IMMATURE GRANULOCYTES: 1 %
LYMPHOCYTES # BLD: 17 % (ref 24–43)
MCH RBC QN AUTO: 29 PG (ref 25.2–33.5)
MCHC RBC AUTO-ENTMCNC: 29.5 G/DL (ref 28.4–34.8)
MCV RBC AUTO: 98.2 FL (ref 82.6–102.9)
MONOCYTES # BLD: 7 % (ref 3–12)
NRBC AUTOMATED: 0 PER 100 WBC
PDW BLD-RTO: 15.3 % (ref 11.8–14.4)
PLATELET # BLD: 208 K/UL (ref 138–453)
PLATELET ESTIMATE: ABNORMAL
PMV BLD AUTO: 10.6 FL (ref 8.1–13.5)
RBC # BLD: 2.76 M/UL (ref 3.95–5.11)
RBC # BLD: ABNORMAL 10*6/UL
SEG NEUTROPHILS: 69 % (ref 36–65)
SEGMENTED NEUTROPHILS ABSOLUTE COUNT: 6.2 K/UL (ref 1.5–8.1)
WBC # BLD: 8.9 K/UL (ref 3.5–11.3)
WBC # BLD: ABNORMAL 10*3/UL

## 2019-04-16 PROCEDURE — 85025 COMPLETE CBC W/AUTO DIFF WBC: CPT

## 2019-04-16 PROCEDURE — 36415 COLL VENOUS BLD VENIPUNCTURE: CPT

## 2019-04-26 ENCOUNTER — HOSPITAL ENCOUNTER (EMERGENCY)
Age: 64
Discharge: ANOTHER HOSPITAL | End: 2019-04-26
Attending: EMERGENCY MEDICINE
Payer: MEDICARE

## 2019-04-26 VITALS
BODY MASS INDEX: 47.65 KG/M2 | DIASTOLIC BLOOD PRESSURE: 70 MMHG | HEART RATE: 62 BPM | TEMPERATURE: 98.4 F | SYSTOLIC BLOOD PRESSURE: 139 MMHG | RESPIRATION RATE: 16 BRPM | OXYGEN SATURATION: 100 % | WEIGHT: 228 LBS

## 2019-04-26 DIAGNOSIS — N93.9 VAGINAL BLEEDING: Primary | ICD-10-CM

## 2019-04-26 LAB
ALBUMIN SERPL-MCNC: 3.1 G/DL (ref 3.5–5.2)
ALBUMIN/GLOBULIN RATIO: 1.3 (ref 1–2.5)
ALP BLD-CCNC: 81 U/L (ref 35–104)
ALT SERPL-CCNC: 8 U/L (ref 5–33)
ANION GAP SERPL CALCULATED.3IONS-SCNC: 13 MMOL/L (ref 9–17)
AST SERPL-CCNC: 8 U/L
BILIRUB SERPL-MCNC: <0.1 MG/DL (ref 0.3–1.2)
BUN BLDV-MCNC: 28 MG/DL (ref 8–23)
BUN/CREAT BLD: 9 (ref 9–20)
CALCIUM SERPL-MCNC: 8.1 MG/DL (ref 8.6–10.4)
CHLORIDE BLD-SCNC: 109 MMOL/L (ref 98–107)
CO2: 19 MMOL/L (ref 20–31)
CREAT SERPL-MCNC: 3.22 MG/DL (ref 0.5–0.9)
GFR AFRICAN AMERICAN: 17 ML/MIN
GFR NON-AFRICAN AMERICAN: 14 ML/MIN
GFR SERPL CREATININE-BSD FRML MDRD: ABNORMAL ML/MIN/{1.73_M2}
GFR SERPL CREATININE-BSD FRML MDRD: ABNORMAL ML/MIN/{1.73_M2}
GLUCOSE BLD-MCNC: 127 MG/DL (ref 70–99)
HCG QUANTITATIVE: 2 IU/L
HCT VFR BLD CALC: 15.9 % (ref 36.3–47.1)
HEMOGLOBIN: 4.7 G/DL (ref 11.9–15.1)
MCH RBC QN AUTO: 30.9 PG (ref 25.2–33.5)
MCHC RBC AUTO-ENTMCNC: 29.6 G/DL (ref 28.4–34.8)
MCV RBC AUTO: 104.6 FL (ref 82.6–102.9)
NRBC AUTOMATED: 0 PER 100 WBC
PDW BLD-RTO: 17.6 % (ref 11.8–14.4)
PLATELET # BLD: 156 K/UL (ref 138–453)
PMV BLD AUTO: 10.4 FL (ref 8.1–13.5)
POTASSIUM SERPL-SCNC: 4.4 MMOL/L (ref 3.7–5.3)
RBC # BLD: 1.52 M/UL (ref 3.95–5.11)
SODIUM BLD-SCNC: 141 MMOL/L (ref 135–144)
TOTAL PROTEIN: 5.5 G/DL (ref 6.4–8.3)
WBC # BLD: 10.9 K/UL (ref 3.5–11.3)

## 2019-04-26 PROCEDURE — 99285 EMERGENCY DEPT VISIT HI MDM: CPT

## 2019-04-26 PROCEDURE — 86900 BLOOD TYPING SEROLOGIC ABO: CPT

## 2019-04-26 PROCEDURE — 86850 RBC ANTIBODY SCREEN: CPT

## 2019-04-26 PROCEDURE — 84702 CHORIONIC GONADOTROPIN TEST: CPT

## 2019-04-26 PROCEDURE — 80053 COMPREHEN METABOLIC PANEL: CPT

## 2019-04-26 PROCEDURE — P9016 RBC LEUKOCYTES REDUCED: HCPCS

## 2019-04-26 PROCEDURE — 85027 COMPLETE CBC AUTOMATED: CPT

## 2019-04-26 PROCEDURE — 86901 BLOOD TYPING SEROLOGIC RH(D): CPT

## 2019-04-26 PROCEDURE — 36415 COLL VENOUS BLD VENIPUNCTURE: CPT

## 2019-04-26 PROCEDURE — 36430 TRANSFUSION BLD/BLD COMPNT: CPT

## 2019-04-26 ASSESSMENT — ENCOUNTER SYMPTOMS: RESPIRATORY NEGATIVE: 1

## 2019-04-26 NOTE — ED NOTES
Patient wanted daughter Rosario Marcial called, Lida Kirkpatrick called and updated with plan of care, who verbalized understanding.   Continue to monitor     Romario Nava RN  04/26/19 8140

## 2019-04-26 NOTE — ED PROVIDER NOTES
[address] [facility] ED  eMERGENCY dEPARTMENT eNCOUnter      Pt Name: Cee Stewart  MRN: [id number]  [place] 1955  Date of evaluation: 4/26/2019  Provider: Makenzie Leon MD    CHIEF COMPLAINT     Chief Complaint   Patient presents with    Vaginal Bleeding     states having vaginal bleeding for years. states prob went through 5 pads in last hour, states worse tonight than normal         HISTORY OF PRESENT ILLNESS   (Location/Symptom, Timing/Onset, Context/Setting,Quality, Duration, Modifying Factors, Severity)  Note limiting factors. Cee Stewart is a66 y.o. female who presents to the emergency department      Chest is a 68-year-old female with a history of vaginal bleeding and abnormal pelvic ultrasound who presents with 2 days of exceptionally heavy bleeding. Even normal and her baseline. She is not in this right baseline but does complain of some dizziness. 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 She denies any vaginal pain or discharge. She has been seen once by Dr. Raul Roberson for this. Nursing Notes werereviewed. REVIEW OF SYSTEMS    (2-9 systems for level 4, 10 or more for level 5)     Review of Systems   Constitutional: Positive for fatigue. Negative for fever. Respiratory: Negative. Cardiovascular: Negative. Genitourinary: Positive for vaginal bleeding. Negative for genital sores, pelvic pain and vaginal discharge. Neurological: Positive for light-headedness. Negative for tremors and weakness. Except as noted above the remainder of the review of systems was reviewed and negative.        PAST MEDICAL HISTORY     Past Medical History:   Diagnosis Date    Cerebral aneurysm 0    CHF (congestive heart failure) (HCC)     Chronic renal insufficiency     Intracranial hemorrhage ([facility] 1997    Obesity     Respiratory failure, acute ([address] 1997    Seizures ([facility]     no seizure since before 2002 radiologist. Aldo Wang radiographic images are visualized and preliminarily interpreted by the emergency physician with the below findings:      Interpretationper the Radiologist below, if available at the time of this note:    No orders to display         ED BEDSIDE ULTRASOUND:   Performed by ED Physician - none    LABS:  Labs Reviewed   CBC - Abnormal; Notable for the following components:       Result Value    RBC 1.52 (*)     Hemoglobin 4.7 (*)     Hematocrit 15.9 (*)     .6 (*)     RDW 17.6 (*)     All other components within normal limits   COMPREHENSIVE METABOLIC PANEL - Abnormal; Notable for the following components:    Glucose 127 (*)     BUN 28 (*)     CREATININE 3.22 (*)     Calcium 8.1 (*)     Chloride 109 (*)     CO2 19 (*)     Total Bilirubin <0.10 (*)     Total Protein 5.5 (*)     Alb 3.1 (*)     GFR Non- 14 (*)     GFR  17 (*)     All other components within normal limits   HCG, QUANTITATIVE, PREGNANCY   TYPE AND SCREEN   PREPARE RBC (CROSSMATCH)       All other labs were within normal range or not returned as of this dictation. EMERGENCY DEPARTMENT COURSE and DIFFERENTIAL DIAGNOSIS/MDM:   Vitals:    Vitals:    04/26/19 0530 04/26/19 0540 04/26/19 0546 04/26/19 0601   BP:  109/63 125/71 (!) 112/38   Pulse:  64 72    Resp:       Temp:       TempSrc:       SpO2: 93% 97% 99% 95%         MDM  Number of Diagnoses or Management Options  Vaginal bleeding:   Diagnosis management comments: Patient's hemoglobin with marcated drop from 10 days ago. Currently 4.7. Typed and crossed for transfusion of 2 units. We'll transfer to [facility] for evaluation by Dr. Colletta Males            Procedures    FINAL IMPRESSION      1. Vaginal bleeding        DISPOSITION/PLAN   DISPOSITION Decision To Transfer 04/26/2019 06:08:39 AM      PATIENT REFERRED TO:  No follow-up provider specified.     DISCHARGE MEDICATIONS:  New Prescriptions    No medications on file

## 2019-04-26 NOTE — ED NOTES
Patient on stretcher with eyes closed, soft snoring heard from the patient.  Will continue to monitor     Claudette Costello RN  04/26/19 1519

## 2019-04-26 NOTE — ED NOTES
Spoke to Radha at [address] access to page Dr Tyronne Riedel at [facility] for [place] transfer, waiting for call back     Romario Aguila  04/26/19 4768

## 2019-04-26 NOTE — ED NOTES
Dr Reymundo Guerrero (covering for Dr Mike Rothman) called back and spoke to Dr Bridger Graham  04/26/19 8908

## 2019-04-28 LAB
ABO/RH: NORMAL
ANTIBODY SCREEN: NEGATIVE
ARM BAND NUMBER: NORMAL
BLD PROD TYP BPU: NORMAL
CROSSMATCH RESULT: NORMAL
DISPENSE STATUS BLOOD BANK: NORMAL
EXPIRATION DATE: NORMAL
TRANSFUSION STATUS: NORMAL
UNIT DIVISION: 0
UNIT NUMBER: NORMAL

## 2019-05-15 ENCOUNTER — HOSPITAL ENCOUNTER (EMERGENCY)
Age: 64
Discharge: HOME | End: 2019-05-16
Attending: EMERGENCY MEDICINE
Payer: MEDICARE

## 2019-05-15 ENCOUNTER — APPOINTMENT (OUTPATIENT)
Dept: GENERAL RADIOLOGY | Age: 64
End: 2019-05-15
Payer: MEDICARE

## 2019-05-15 ENCOUNTER — HOSPITAL ENCOUNTER (OUTPATIENT)
Age: 64
Setting detail: SPECIMEN
Discharge: HOME | End: 2019-05-15
Payer: MEDICARE

## 2019-05-15 DIAGNOSIS — J18.9 PNEUMONIA DUE TO ORGANISM: Primary | ICD-10-CM

## 2019-05-15 LAB
-: ABNORMAL
ABSOLUTE EOS #: 0 K/UL (ref 0–0.44)
ABSOLUTE EOS #: 0.1 K/UL (ref 0–0.44)
ABSOLUTE IMMATURE GRANULOCYTE: 0 K/UL (ref 0–0.3)
ABSOLUTE IMMATURE GRANULOCYTE: 0.07 K/UL (ref 0–0.3)
ABSOLUTE LYMPH #: 0.76 K/UL (ref 1.1–3.7)
ABSOLUTE LYMPH #: 0.98 K/UL (ref 1.1–3.7)
ABSOLUTE MONO #: 1.4 K/UL (ref 0.1–1.2)
ABSOLUTE MONO #: 1.49 K/UL (ref 0.1–1.2)
ALBUMIN SERPL-MCNC: 3.3 G/DL (ref 3.5–5.2)
ALBUMIN SERPL-MCNC: 3.4 G/DL (ref 3.5–5.2)
ALBUMIN/GLOBULIN RATIO: 0.9 (ref 1–2.5)
ALBUMIN/GLOBULIN RATIO: 1.1 (ref 1–2.5)
ALLEN TEST: ABNORMAL
ALP BLD-CCNC: 71 U/L (ref 35–104)
ALP BLD-CCNC: 77 U/L (ref 35–104)
ALT SERPL-CCNC: 20 U/L (ref 5–33)
ALT SERPL-CCNC: 22 U/L (ref 5–33)
AMORPHOUS: ABNORMAL
ANION GAP SERPL CALCULATED.3IONS-SCNC: 12 MMOL/L (ref 9–17)
ANION GAP SERPL CALCULATED.3IONS-SCNC: 14 MMOL/L (ref 9–17)
AST SERPL-CCNC: 13 U/L
AST SERPL-CCNC: 13 U/L
BACTERIA: ABNORMAL
BASOPHILS # BLD: 0 % (ref 0–2)
BASOPHILS # BLD: 1 % (ref 0–2)
BASOPHILS ABSOLUTE: 0 K/UL (ref 0–0.2)
BASOPHILS ABSOLUTE: 0.06 K/UL (ref 0–0.2)
BILIRUB SERPL-MCNC: 0.29 MG/DL (ref 0.3–1.2)
BILIRUB SERPL-MCNC: 0.3 MG/DL (ref 0.3–1.2)
BILIRUBIN URINE: NEGATIVE
BUN BLDV-MCNC: 21 MG/DL (ref 8–23)
BUN BLDV-MCNC: 22 MG/DL (ref 8–23)
BUN/CREAT BLD: 9 (ref 9–20)
BUN/CREAT BLD: 9 (ref 9–20)
CALCIUM SERPL-MCNC: 9.2 MG/DL (ref 8.6–10.4)
CALCIUM SERPL-MCNC: 9.3 MG/DL (ref 8.6–10.4)
CARBOXYHEMOGLOBIN: ABNORMAL % (ref 0–5)
CASTS UA: ABNORMAL /LPF
CHLORIDE BLD-SCNC: 107 MMOL/L (ref 98–107)
CHLORIDE BLD-SCNC: 111 MMOL/L (ref 98–107)
CO2: 23 MMOL/L (ref 20–31)
CO2: 23 MMOL/L (ref 20–31)
COLOR: YELLOW
COMMENT UA: ABNORMAL
CREAT SERPL-MCNC: 2.24 MG/DL (ref 0.5–0.9)
CREAT SERPL-MCNC: 2.55 MG/DL (ref 0.5–0.9)
CRYSTALS, UA: ABNORMAL /HPF
DIFFERENTIAL TYPE: ABNORMAL
DIFFERENTIAL TYPE: ABNORMAL
EOSINOPHILS RELATIVE PERCENT: 0 % (ref 1–4)
EOSINOPHILS RELATIVE PERCENT: 1 % (ref 1–4)
EPITHELIAL CELLS UA: ABNORMAL /HPF (ref 0–25)
FIO2: ABNORMAL
GFR AFRICAN AMERICAN: 23 ML/MIN
GFR AFRICAN AMERICAN: 27 ML/MIN
GFR NON-AFRICAN AMERICAN: 19 ML/MIN
GFR NON-AFRICAN AMERICAN: 22 ML/MIN
GFR SERPL CREATININE-BSD FRML MDRD: ABNORMAL ML/MIN/{1.73_M2}
GLUCOSE BLD-MCNC: 110 MG/DL (ref 70–99)
GLUCOSE BLD-MCNC: 111 MG/DL (ref 70–99)
GLUCOSE URINE: NEGATIVE
HCO3 VENOUS: 24.4 MMOL/L (ref 24–30)
HCT VFR BLD CALC: 32.3 % (ref 36.3–47.1)
HCT VFR BLD CALC: 33 % (ref 36.3–47.1)
HEMOGLOBIN: 9.5 G/DL (ref 11.9–15.1)
HEMOGLOBIN: 9.6 G/DL (ref 11.9–15.1)
IMMATURE GRANULOCYTES: 0 %
IMMATURE GRANULOCYTES: 1 %
INR BLD: 4 (ref 0.9–1.2)
KETONES, URINE: ABNORMAL
LACTIC ACID, SEPSIS WHOLE BLOOD: NORMAL MMOL/L (ref 0.5–1.9)
LACTIC ACID, SEPSIS: 1.3 MMOL/L (ref 0.5–1.9)
LEUKOCYTE ESTERASE, URINE: NEGATIVE
LYMPHOCYTES # BLD: 7 % (ref 24–43)
LYMPHOCYTES # BLD: 7 % (ref 24–43)
MCH RBC QN AUTO: 28.4 PG (ref 25.2–33.5)
MCH RBC QN AUTO: 29 PG (ref 25.2–33.5)
MCHC RBC AUTO-ENTMCNC: 29.1 G/DL (ref 28.4–34.8)
MCHC RBC AUTO-ENTMCNC: 29.4 G/DL (ref 28.4–34.8)
MCV RBC AUTO: 97.6 FL (ref 82.6–102.9)
MCV RBC AUTO: 98.5 FL (ref 82.6–102.9)
METHEMOGLOBIN: ABNORMAL % (ref 0–1.9)
MODE: ABNORMAL
MONOCYTES # BLD: 10 % (ref 3–12)
MONOCYTES # BLD: 13 % (ref 3–12)
MORPHOLOGY: ABNORMAL
MUCUS: ABNORMAL
NEGATIVE BASE EXCESS, VEN: 1.8 MMOL/L (ref 0–2)
NITRITE, URINE: NEGATIVE
NOTIFICATION TIME: ABNORMAL
NOTIFICATION: ABNORMAL
NRBC AUTOMATED: 0 PER 100 WBC
NRBC AUTOMATED: 0 PER 100 WBC
O2 DEVICE/FLOW/%: ABNORMAL
O2 SAT, VEN: 57.3 % (ref 60–85)
OTHER OBSERVATIONS UA: ABNORMAL
OXYHEMOGLOBIN: ABNORMAL % (ref 95–98)
PATIENT TEMP: 37
PCO2, VEN, TEMP ADJ: ABNORMAL MMHG (ref 39–55)
PCO2, VEN: 46.8 (ref 39–55)
PDW BLD-RTO: 19 % (ref 11.8–14.4)
PDW BLD-RTO: 19.2 % (ref 11.8–14.4)
PEEP/CPAP: ABNORMAL
PH UA: 6 (ref 5–9)
PH VENOUS: 7.33 (ref 7.32–7.42)
PH, VEN, TEMP ADJ: ABNORMAL (ref 7.32–7.42)
PLATELET # BLD: 163 K/UL (ref 138–453)
PLATELET # BLD: 166 K/UL (ref 138–453)
PLATELET ESTIMATE: ABNORMAL
PLATELET ESTIMATE: ABNORMAL
PMV BLD AUTO: 11.4 FL (ref 8.1–13.5)
PMV BLD AUTO: 12.2 FL (ref 8.1–13.5)
PO2, VEN, TEMP ADJ: ABNORMAL MMHG (ref 30–50)
PO2, VEN: 32.1 (ref 30–50)
POSITIVE BASE EXCESS, VEN: ABNORMAL MMOL/L (ref 0–2)
POTASSIUM SERPL-SCNC: 4.8 MMOL/L (ref 3.7–5.3)
POTASSIUM SERPL-SCNC: 4.9 MMOL/L (ref 3.7–5.3)
PROTEIN UA: ABNORMAL
PROTHROMBIN TIME: 39 SEC (ref 9.7–12.2)
PSV: ABNORMAL
PT. POSITION: ABNORMAL
RBC # BLD: 3.28 M/UL (ref 3.95–5.11)
RBC # BLD: 3.38 M/UL (ref 3.95–5.11)
RBC # BLD: ABNORMAL 10*6/UL
RBC # BLD: ABNORMAL 10*6/UL
RBC UA: ABNORMAL /HPF (ref 0–2)
RENAL EPITHELIAL, UA: ABNORMAL /HPF
RESPIRATORY RATE: ABNORMAL
SAMPLE SITE: ABNORMAL
SEG NEUTROPHILS: 77 % (ref 36–65)
SEG NEUTROPHILS: 83 % (ref 36–65)
SEGMENTED NEUTROPHILS ABSOLUTE COUNT: 11.62 K/UL (ref 1.5–8.1)
SEGMENTED NEUTROPHILS ABSOLUTE COUNT: 9.2 K/UL (ref 1.5–8.1)
SET RATE: ABNORMAL
SODIUM BLD-SCNC: 144 MMOL/L (ref 135–144)
SODIUM BLD-SCNC: 146 MMOL/L (ref 135–144)
SPECIFIC GRAVITY UA: 1.01 (ref 1.01–1.02)
TEXT FOR RESPIRATORY: ABNORMAL
TOTAL HB: ABNORMAL G/DL (ref 12–16)
TOTAL PROTEIN: 6.5 G/DL (ref 6.4–8.3)
TOTAL PROTEIN: 6.9 G/DL (ref 6.4–8.3)
TOTAL RATE: ABNORMAL
TRICHOMONAS: ABNORMAL
TURBIDITY: CLEAR
URINE HGB: ABNORMAL
UROBILINOGEN, URINE: NORMAL
VT: ABNORMAL
WBC # BLD: 11.7 K/UL (ref 3.5–11.3)
WBC # BLD: 14 K/UL (ref 3.5–11.3)
WBC # BLD: ABNORMAL 10*3/UL
WBC # BLD: ABNORMAL 10*3/UL
WBC UA: ABNORMAL /HPF (ref 0–5)
YEAST: ABNORMAL

## 2019-05-15 PROCEDURE — 81001 URINALYSIS AUTO W/SCOPE: CPT

## 2019-05-15 PROCEDURE — 71045 X-RAY EXAM CHEST 1 VIEW: CPT

## 2019-05-15 PROCEDURE — 2580000003 HC RX 258: Performed by: EMERGENCY MEDICINE

## 2019-05-15 PROCEDURE — 80202 ASSAY OF VANCOMYCIN: CPT

## 2019-05-15 PROCEDURE — 82800 BLOOD PH: CPT

## 2019-05-15 PROCEDURE — 82805 BLOOD GASES W/O2 SATURATION: CPT

## 2019-05-15 PROCEDURE — 96365 THER/PROPH/DIAG IV INF INIT: CPT

## 2019-05-15 PROCEDURE — P9603 ONE-WAY ALLOW PRORATED MILES: HCPCS

## 2019-05-15 PROCEDURE — 80053 COMPREHEN METABOLIC PANEL: CPT

## 2019-05-15 PROCEDURE — 6370000000 HC RX 637 (ALT 250 FOR IP): Performed by: EMERGENCY MEDICINE

## 2019-05-15 PROCEDURE — 6360000002 HC RX W HCPCS: Performed by: EMERGENCY MEDICINE

## 2019-05-15 PROCEDURE — 83605 ASSAY OF LACTIC ACID: CPT

## 2019-05-15 PROCEDURE — 85025 COMPLETE CBC W/AUTO DIFF WBC: CPT

## 2019-05-15 PROCEDURE — 87040 BLOOD CULTURE FOR BACTERIA: CPT

## 2019-05-15 PROCEDURE — 85610 PROTHROMBIN TIME: CPT

## 2019-05-15 PROCEDURE — 96366 THER/PROPH/DIAG IV INF ADDON: CPT

## 2019-05-15 PROCEDURE — 36415 COLL VENOUS BLD VENIPUNCTURE: CPT

## 2019-05-15 PROCEDURE — 99285 EMERGENCY DEPT VISIT HI MDM: CPT

## 2019-05-15 PROCEDURE — 96367 TX/PROPH/DG ADDL SEQ IV INF: CPT

## 2019-05-15 RX ORDER — LEVOFLOXACIN 500 MG/1
500 TABLET, FILM COATED ORAL DAILY
Qty: 10 TABLET | Refills: 0 | Status: SHIPPED | OUTPATIENT
Start: 2019-05-15 | End: 2019-05-15 | Stop reason: SDUPTHER

## 2019-05-15 RX ORDER — WARFARIN SODIUM 3 MG/1
3 TABLET ORAL DAILY
Status: ON HOLD | COMMUNITY
End: 2021-01-01 | Stop reason: HOSPADM

## 2019-05-15 RX ORDER — AZITHROMYCIN 250 MG/1
250 TABLET, FILM COATED ORAL DAILY
COMMUNITY
End: 2019-08-29 | Stop reason: ALTCHOICE

## 2019-05-15 RX ORDER — SODIUM CHLORIDE 0.9 % (FLUSH) 0.9 %
10 SYRINGE (ML) INJECTION EVERY 12 HOURS SCHEDULED
Status: DISCONTINUED | OUTPATIENT
Start: 2019-05-15 | End: 2019-05-16 | Stop reason: HOSPADM

## 2019-05-15 RX ORDER — POLYETHYLENE GLYCOL 3350 17 G/17G
17 POWDER, FOR SOLUTION ORAL DAILY
COMMUNITY
End: 2019-10-04 | Stop reason: ALTCHOICE

## 2019-05-15 RX ORDER — LEVOFLOXACIN 250 MG/1
TABLET ORAL
Qty: 12 TABLET | Refills: 0 | Status: SHIPPED | OUTPATIENT
Start: 2019-05-15 | End: 2019-05-25

## 2019-05-15 RX ORDER — ACETAMINOPHEN 325 MG/1
650 TABLET ORAL ONCE
Status: COMPLETED | OUTPATIENT
Start: 2019-05-15 | End: 2019-05-15

## 2019-05-15 RX ORDER — SODIUM CHLORIDE 0.9 % (FLUSH) 0.9 %
10 SYRINGE (ML) INJECTION PRN
Status: DISCONTINUED | OUTPATIENT
Start: 2019-05-15 | End: 2019-05-16 | Stop reason: HOSPADM

## 2019-05-15 RX ADMIN — VANCOMYCIN HYDROCHLORIDE 1500 MG: 1 INJECTION, POWDER, LYOPHILIZED, FOR SOLUTION INTRAVENOUS at 22:05

## 2019-05-15 RX ADMIN — CEFEPIME 2 G: 2 INJECTION, POWDER, FOR SOLUTION INTRAVENOUS at 21:33

## 2019-05-15 RX ADMIN — SODIUM CHLORIDE, PRESERVATIVE FREE 10 ML: 5 INJECTION INTRAVENOUS at 22:11

## 2019-05-15 RX ADMIN — ACETAMINOPHEN 650 MG: 325 TABLET, FILM COATED ORAL at 23:38

## 2019-05-16 VITALS
DIASTOLIC BLOOD PRESSURE: 64 MMHG | SYSTOLIC BLOOD PRESSURE: 116 MMHG | TEMPERATURE: 100.3 F | HEIGHT: 58 IN | RESPIRATION RATE: 18 BRPM | WEIGHT: 227.96 LBS | BODY MASS INDEX: 47.85 KG/M2 | OXYGEN SATURATION: 94 % | HEART RATE: 96 BPM

## 2019-05-16 LAB
VANCOMYCIN RANDOM DATE LAST DOSE: NORMAL
VANCOMYCIN RANDOM DOSE AMOUNT: NORMAL
VANCOMYCIN RANDOM TIME LAST DOSE: NORMAL
VANCOMYCIN RANDOM: <1.7 UG/ML

## 2019-05-16 PROCEDURE — 96366 THER/PROPH/DIAG IV INF ADDON: CPT

## 2019-05-16 NOTE — ED NOTES
Bed: 05  Expected date:   Expected time:   Means of arrival:   Comments:  tonny Lucas, [address] [facility]  05/15/19 2018

## 2019-05-16 NOTE — ED NOTES
Pt awake. Pt states she feels SOB and is tired. Pt encouraged to drink fluids.       Alla Mcdonald RN  05/16/19 7428

## 2019-05-16 NOTE — ED PROVIDER NOTES
[facility]  EMERGENCY DEPARTMENT ENCOUNTER   CHIEF COMPLAINT   Chief Complaint   Patient presents with    Other     Pt sent from [address] [place] [address] for abnormal chest Xray and low oxygen saturation      HPI   Sadia Burgess is a 70 y.o. female who presents with shortness of breath. The onset was unknown. She has history of seizure and cva and is in [facility] where she was discovered to have probable pneumonia on cxr. The duration has been constant since the onset. Pt says she feels good but family apparently wanted her sent in to hospital, although they did not come in with her. In ED off o2 o2 sats 91 to 93% The patient has associated cough. The quality of the cough is a non productive cough. History slightly limited due to patients decreased ability to relate recent events. REVIEW OF SYSTEMS   ROS limited, though patient denies all complaints and says she feels good when asked    PAST MEDICAL & SURGICAL HISTORY   Past Medical History:   Diagnosis Date    Cerebral aneurysm 1997    CHF (congestive heart failure) ([address]     Chronic renal insufficiency     Intracranial hemorrhage ([address] 1997    Obesity     Respiratory failure, acute ([address] 1997    Seizures ([address]     no seizure since before 2002     Past Surgical History:   Procedure Laterality Date    CRANIOTOMY      microdissection    GASTROSTOMY TUBE PLACEMENT      TRACHEOSTOMY        CURRENT MEDICATIONS   Current Outpatient Rx   Medication Sig Dispense Refill    azithromycin (ZITHROMAX) 250 MG tablet Take 250 mg by mouth daily      polyethylene glycol (MIRALAX) PACK packet Take 17 g by mouth daily      warfarin (COUMADIN) 3 MG tablet Take 3 mg by mouth daily      levofloxacin (LEVAQUIN) 250 MG tablet Take 3 tablets by mouth daily for 1 day, THEN 2 tablets every 48 hours for 9 days.  12 tablet 0    carBAMazepine (TEGRETOL-XR) 100 MG extended release tablet Take 2 tablets by mouth 2 times daily 60 tablet 0    enalapril (VASOTEC) 2.5 MG tablet Take 2.5 mg by mouth daily.  vitamin D (CHOLECALCIFEROL) 03075 UNIT CAPS Take 1 capsule by mouth once a week for 8 doses And then one cap every month for the next 4 months 12 capsule 0    BELLADONNA ALK-PHENOBARBITAL PO Take  by mouth.  Melatonin 3 MG TABS Take 3 mg by mouth nightly.  aspirin 81 MG EC tablet Take 81 mg by mouth daily.  hydrocodone-acetaminophen (VICODIN) 5-500 MG per tablet Take 1 tablet by mouth every 6 hours as needed.  metformin (GLUCOPHAGE) 500 MG tablet Take 500 mg by mouth daily (with breakfast).           ALLERGIES   Allergies   Allergen Reactions    Pcn [Penicillins]       SOCIAL & FAMILY HISTORY   Social History     Socioeconomic History    Marital status:      Spouse name: None    Number of children: None    Years of education: None    Highest education level: None   Occupational History    None   Social Needs    Financial resource strain: None    Food insecurity:     Worry: None     Inability: None    Transportation needs:     Medical: None     Non-medical: None   Tobacco Use    Smoking status: Former Smoker     Packs/day: 1.00     Years: 15.00     Pack years: 15.00     Types: Cigarettes    Smokeless tobacco: Never Used    Tobacco comment: quit 30 yrs ago   Substance and Sexual Activity    Alcohol use: No    Drug use: None    Sexual activity: None   Lifestyle    Physical activity:     Days per week: None     Minutes per session: None    Stress: None   Relationships    Social connections:     Talks on phone: None     Gets together: None     Attends [employer] service: None     Active member of club or organization: None     Attends meetings of clubs or organizations: None     Relationship status: None    Intimate partner violence:     Fear of current or ex partner: None     Emotionally abused: None     Physically abused: None     Forced sexual activity: None   Other Topics Concern    None   Social History Narrative    None     History reviewed. No pertinent family history. PHYSICAL EXAM   VITAL SIGNS: /76   Pulse 96   Temp 100.3 °F (37.9 °C) (Tympanic)   Resp 18   Ht 4' 9.87\" (1.47 m)   Wt 227 lb 15.3 oz (103.4 kg)   SpO2 94%   BMI 47.85 kg/m²    Constitutional: overweight, appears chronically ill   HENT: Atraumatic, slightly dry mucus membranes  Neck: supple, no JVD   Respiratory: Lungs reveal diminished lung sounds bilaterally with basilar crackles and poor effort  Cardiovascular: regular rate, no murmurs  GI: Soft, nontender, normal bowel sounds  Musculoskeletal: bilateral LE edema, no acute deformities  Integument: Skin is warm and dry, no petechiae   Neurologic: Alert , slow, likely baseline speech speech  Psych: Pleasant affect, the patient does not appear anxious         RADIOLOGY/PROCEDURES   XR CHEST PORTABLE   Final Result   Moderate cardiomegaly           ED COURSE & MEDICAL DECISION MAKING   Pertinent Labs & Imaging studies reviewed and interpreted. (See chart for details)     Vitals:    05/16/19 0108   BP: 118/76   Pulse:    Resp:    Temp:    SpO2:        Differential Diagnosis: COPD exacerbation, foreign body aspiration, Congestive Heart Failure, Myocardial Infarction, Pulmonary Embolus, Pneumonia, Pneumothorax, other     MDM: Patient will return to nursing home with diagnosis of pneumonia. While x-ray was normal today, and noted the temperature maximum 100.3 and that she was previously diagnosed with pneumonia in the outpatient setting. She'll be returned here from the nursing home again if she is not doing well on Levaquin at the nursing home. I have sent her home with that really just Levaquin. FINAL IMPRESSION   1.  Pneumonia due to organism      PLAN  Discharge to nursing home  Electronically signed by: Nathan Singh MD, 5/16/2019 1:10 AM  (This note was completed [facility] a voice recognition program)            Nathan Singh MD  05/16/19 0110

## 2019-05-16 NOTE — PROGRESS NOTES
Pharmacy Note  Vancomycin Consult    Cee Stewart is a 70 y.o. female started on Vancomycin for HAP; consult received from Dr. Marie Mccabe to manage therapy. Also receiving the following antibiotics: none. Patient Active Problem List   Diagnosis    Cholelithiasis    CKD (chronic kidney disease) stage 3, GFR 30-59 ml/min ([facility])    Essential hypertension    Hemorrhagic cerebrovascular accident (CVA) ([address]    Pelvic pain    Obesity    Seizures ([facility]    Chronic renal insufficiency    Cerebral aneurysm    Urinary retention    Hypoxia    CKD (chronic kidney disease) stage 4, GFR 15-29 ml/min ([facility])    Chronic diastolic CHF (congestive heart failure), NYHA class 3 ([facility])    Toxic encephalopathy    Hyperkalemia    Acute renal failure superimposed on chronic kidney disease ([facility])    Vaginal mass    Abdominal pain    Endometrial disorder    Pelvic mass    Anemia due to blood loss    Anemia       Allergies:  Pcn [penicillins]     Temp max: 100.3    Recent Labs     05/15/19  0540   BUN 21       Recent Labs     05/15/19  0540   CREATININE 2.24*       Recent Labs     05/15/19  0540   WBC 11.7*       No intake or output data in the 24 hours ending 05/15/19 2103    Culture Date      Source                       Results  pending    Ht Readings from Last 1 Encounters:   05/15/19 4' 9.87\" (1.47 m)        Wt Readings from Last 1 Encounters:   05/15/19 227 lb 15.3 oz (103.4 kg)         Body mass index is 47.85 kg/m². CrCl cannot be calculated (Unknown ideal weight.). Goal Trough Level: 15-20 mcg/mL    Assessment/Plan:  Will initiate vancomycin 1500 mg IV for one dose. Will order random level on 5/16/2019 due to renal insufficiency. Timing of trough level will be determined based on culture results, renal function, and clinical response. Thank you for the consult. Will continue to follow.     Samantha Rubalcava [facility]  5/15/2019 9:05 PM

## 2019-05-20 LAB
CULTURE: NORMAL
Lab: NORMAL
SPECIMEN DESCRIPTION: NORMAL

## 2019-05-28 ENCOUNTER — HOSPITAL ENCOUNTER (OUTPATIENT)
Age: 64
Setting detail: SPECIMEN
Discharge: HOME | End: 2019-05-28
Payer: MEDICARE

## 2019-05-31 ENCOUNTER — HOSPITAL ENCOUNTER (OUTPATIENT)
Age: 64
Discharge: HOME | End: 2019-05-31
Payer: MEDICARE

## 2019-05-31 LAB
INR BLD: 1.2 (ref 0.9–1.2)
PROTHROMBIN TIME: 11.9 SEC (ref 9.7–12.2)

## 2019-05-31 PROCEDURE — 36415 COLL VENOUS BLD VENIPUNCTURE: CPT

## 2019-05-31 PROCEDURE — 85610 PROTHROMBIN TIME: CPT

## 2019-06-06 ENCOUNTER — HOSPITAL ENCOUNTER (OUTPATIENT)
Age: 64
Discharge: HOME | End: 2019-06-06
Payer: MEDICARE

## 2019-06-06 LAB
ABSOLUTE EOS #: 0 K/UL (ref 0–0.44)
ABSOLUTE IMMATURE GRANULOCYTE: 0 K/UL (ref 0–0.3)
ABSOLUTE LYMPH #: 1.48 K/UL (ref 1.1–3.7)
ABSOLUTE MONO #: 0.24 K/UL (ref 0.1–1.2)
ALBUMIN SERPL-MCNC: 3.1 G/DL (ref 3.5–5.2)
ALBUMIN/GLOBULIN RATIO: 1.2 (ref 1–2.5)
ALP BLD-CCNC: 56 U/L (ref 35–104)
ALT SERPL-CCNC: 9 U/L (ref 5–33)
ANION GAP SERPL CALCULATED.3IONS-SCNC: 12 MMOL/L (ref 9–17)
AST SERPL-CCNC: 8 U/L
BASOPHILS # BLD: 0 % (ref 0–2)
BASOPHILS ABSOLUTE: 0 K/UL (ref 0–0.2)
BILIRUB SERPL-MCNC: 0.15 MG/DL (ref 0.3–1.2)
BUN BLDV-MCNC: 45 MG/DL (ref 8–23)
BUN/CREAT BLD: 9 (ref 9–20)
CALCIUM SERPL-MCNC: 8.7 MG/DL (ref 8.6–10.4)
CHLORIDE BLD-SCNC: 110 MMOL/L (ref 98–107)
CO2: 19 MMOL/L (ref 20–31)
CREAT SERPL-MCNC: 4.8 MG/DL (ref 0.5–0.9)
DIFFERENTIAL TYPE: ABNORMAL
EOSINOPHILS RELATIVE PERCENT: 0 % (ref 1–4)
GFR AFRICAN AMERICAN: 11 ML/MIN
GFR NON-AFRICAN AMERICAN: 9 ML/MIN
GFR SERPL CREATININE-BSD FRML MDRD: ABNORMAL ML/MIN/{1.73_M2}
GFR SERPL CREATININE-BSD FRML MDRD: ABNORMAL ML/MIN/{1.73_M2}
GLUCOSE BLD-MCNC: 88 MG/DL (ref 70–99)
HCT VFR BLD CALC: 23.1 % (ref 36.3–47.1)
HEMOGLOBIN: 6.8 G/DL (ref 11.9–15.1)
IMMATURE GRANULOCYTES: 0 %
LYMPHOCYTES # BLD: 25 % (ref 24–43)
MCH RBC QN AUTO: 29.1 PG (ref 25.2–33.5)
MCHC RBC AUTO-ENTMCNC: 29.4 G/DL (ref 28.4–34.8)
MCV RBC AUTO: 98.7 FL (ref 82.6–102.9)
MONOCYTES # BLD: 4 % (ref 3–12)
MORPHOLOGY: ABNORMAL
MORPHOLOGY: ABNORMAL
NRBC AUTOMATED: 0 PER 100 WBC
PDW BLD-RTO: 18.6 % (ref 11.8–14.4)
PLATELET # BLD: ABNORMAL K/UL (ref 138–453)
PLATELET ESTIMATE: ABNORMAL
PLATELET, FLUORESCENCE: 100 K/UL (ref 138–453)
PLATELET, IMMATURE FRACTION: 3.2 % (ref 1.1–10.3)
PMV BLD AUTO: ABNORMAL FL (ref 8.1–13.5)
POTASSIUM SERPL-SCNC: 4.7 MMOL/L (ref 3.7–5.3)
RBC # BLD: 2.34 M/UL (ref 3.95–5.11)
RBC # BLD: ABNORMAL 10*6/UL
SEG NEUTROPHILS: 71 % (ref 36–65)
SEGMENTED NEUTROPHILS ABSOLUTE COUNT: 4.18 K/UL (ref 1.5–8.1)
SODIUM BLD-SCNC: 141 MMOL/L (ref 135–144)
TOTAL PROTEIN: 5.6 G/DL (ref 6.4–8.3)
WBC # BLD: 5.9 K/UL (ref 3.5–11.3)
WBC # BLD: ABNORMAL 10*3/UL

## 2019-06-06 PROCEDURE — 85025 COMPLETE CBC W/AUTO DIFF WBC: CPT

## 2019-06-06 PROCEDURE — 85055 RETICULATED PLATELET ASSAY: CPT

## 2019-06-06 PROCEDURE — 36415 COLL VENOUS BLD VENIPUNCTURE: CPT

## 2019-06-06 PROCEDURE — 80053 COMPREHEN METABOLIC PANEL: CPT

## 2019-06-21 ENCOUNTER — HOSPITAL ENCOUNTER (OUTPATIENT)
Age: 64
Discharge: HOME | End: 2019-06-21
Payer: MEDICARE

## 2019-06-21 LAB
-: ABNORMAL
AMORPHOUS: ABNORMAL
ANION GAP SERPL CALCULATED.3IONS-SCNC: 11 MMOL/L (ref 9–17)
BACTERIA: ABNORMAL
BILIRUBIN URINE: NEGATIVE
BUN BLDV-MCNC: 28 MG/DL (ref 8–23)
BUN/CREAT BLD: 7 (ref 9–20)
CALCIUM SERPL-MCNC: 9 MG/DL (ref 8.6–10.4)
CASTS UA: ABNORMAL /LPF
CHLORIDE BLD-SCNC: 112 MMOL/L (ref 98–107)
CO2: 21 MMOL/L (ref 20–31)
COLOR: YELLOW
COMMENT UA: ABNORMAL
CREAT SERPL-MCNC: 3.95 MG/DL (ref 0.5–0.9)
CREATININE URINE: 60.3 MG/DL (ref 28–217)
CRYSTALS, UA: ABNORMAL /HPF
EPITHELIAL CELLS UA: ABNORMAL /HPF (ref 0–25)
GFR AFRICAN AMERICAN: 14 ML/MIN
GFR NON-AFRICAN AMERICAN: 11 ML/MIN
GFR SERPL CREATININE-BSD FRML MDRD: ABNORMAL ML/MIN/{1.73_M2}
GFR SERPL CREATININE-BSD FRML MDRD: ABNORMAL ML/MIN/{1.73_M2}
GLUCOSE BLD-MCNC: 74 MG/DL (ref 70–99)
GLUCOSE URINE: NEGATIVE
HCT VFR BLD CALC: 32.3 % (ref 36.3–47.1)
HEMOGLOBIN: 9.4 G/DL (ref 11.9–15.1)
KETONES, URINE: NEGATIVE
LEUKOCYTE ESTERASE, URINE: ABNORMAL
MCH RBC QN AUTO: 28.6 PG (ref 25.2–33.5)
MCHC RBC AUTO-ENTMCNC: 29.1 G/DL (ref 28.4–34.8)
MCV RBC AUTO: 98.2 FL (ref 82.6–102.9)
MUCUS: ABNORMAL
NITRITE, URINE: NEGATIVE
NRBC AUTOMATED: 0 PER 100 WBC
OTHER OBSERVATIONS UA: ABNORMAL
PDW BLD-RTO: 17.5 % (ref 11.8–14.4)
PH UA: 6 (ref 5–9)
PLATELET # BLD: 178 K/UL (ref 138–453)
PMV BLD AUTO: 11.4 FL (ref 8.1–13.5)
POTASSIUM SERPL-SCNC: 4.5 MMOL/L (ref 3.7–5.3)
PROTEIN UA: ABNORMAL
RBC # BLD: 3.29 M/UL (ref 3.95–5.11)
RBC UA: ABNORMAL /HPF (ref 0–2)
RENAL EPITHELIAL, UA: ABNORMAL /HPF
SODIUM BLD-SCNC: 144 MMOL/L (ref 135–144)
SPECIFIC GRAVITY UA: 1.01 (ref 1.01–1.02)
TOTAL PROTEIN, URINE: 58 MG/DL
TRICHOMONAS: ABNORMAL
TURBIDITY: CLEAR
URINE HGB: ABNORMAL
UROBILINOGEN, URINE: NORMAL
WBC # BLD: 9 K/UL (ref 3.5–11.3)
WBC UA: ABNORMAL /HPF (ref 0–5)
YEAST: ABNORMAL

## 2019-06-21 PROCEDURE — 85027 COMPLETE CBC AUTOMATED: CPT

## 2019-06-21 PROCEDURE — 80048 BASIC METABOLIC PNL TOTAL CA: CPT

## 2019-06-21 PROCEDURE — 81001 URINALYSIS AUTO W/SCOPE: CPT

## 2019-06-21 PROCEDURE — 82570 ASSAY OF URINE CREATININE: CPT

## 2019-06-21 PROCEDURE — 84156 ASSAY OF PROTEIN URINE: CPT

## 2019-06-21 PROCEDURE — 36415 COLL VENOUS BLD VENIPUNCTURE: CPT

## 2019-06-28 ENCOUNTER — HOSPITAL ENCOUNTER (OUTPATIENT)
Age: 64
Discharge: HOME | End: 2019-06-28
Payer: MEDICARE

## 2019-06-28 LAB
% CKMB: 3.8 % (ref 0–3)
ALBUMIN SERPL-MCNC: 3.4 G/DL (ref 3.5–5.2)
ANION GAP SERPL CALCULATED.3IONS-SCNC: 12 MMOL/L (ref 9–17)
BUN BLDV-MCNC: 39 MG/DL (ref 8–23)
BUN/CREAT BLD: 11 (ref 9–20)
CALCIUM SERPL-MCNC: 8.7 MG/DL (ref 8.6–10.4)
CHLORIDE BLD-SCNC: 108 MMOL/L (ref 98–107)
CK MB: 1.2 NG/ML
CKMB INTERPRETATION: ABNORMAL
CO2: 20 MMOL/L (ref 20–31)
CREAT SERPL-MCNC: 3.47 MG/DL (ref 0.5–0.9)
GFR AFRICAN AMERICAN: 16 ML/MIN
GFR NON-AFRICAN AMERICAN: 13 ML/MIN
GFR SERPL CREATININE-BSD FRML MDRD: ABNORMAL ML/MIN/{1.73_M2}
GFR SERPL CREATININE-BSD FRML MDRD: ABNORMAL ML/MIN/{1.73_M2}
GLUCOSE BLD-MCNC: 103 MG/DL (ref 70–99)
HCT VFR BLD CALC: 30.3 % (ref 36.3–47.1)
HEMOGLOBIN: 9 G/DL (ref 11.9–15.1)
INR BLD: 2.1 (ref 0.9–1.2)
MAGNESIUM: 1.7 MG/DL (ref 1.6–2.6)
MCH RBC QN AUTO: 28.8 PG (ref 25.2–33.5)
MCHC RBC AUTO-ENTMCNC: 29.7 G/DL (ref 28.4–34.8)
MCV RBC AUTO: 96.8 FL (ref 82.6–102.9)
NRBC AUTOMATED: 0 PER 100 WBC
PDW BLD-RTO: 16.9 % (ref 11.8–14.4)
PHOSPHORUS: 4.7 MG/DL (ref 2.6–4.5)
PLATELET # BLD: 145 K/UL (ref 138–453)
PMV BLD AUTO: 10.8 FL (ref 8.1–13.5)
POTASSIUM SERPL-SCNC: 4.7 MMOL/L (ref 3.7–5.3)
PROTHROMBIN TIME: 20.7 SEC (ref 9.7–12.2)
PTH INTACT: 447.8 PG/ML (ref 15–65)
RBC # BLD: 3.13 M/UL (ref 3.95–5.11)
SODIUM BLD-SCNC: 140 MMOL/L (ref 135–144)
TOTAL CK: 32 U/L (ref 26–192)
VITAMIN D 25-HYDROXY: 12.5 NG/ML (ref 30–100)
WBC # BLD: 7.9 K/UL (ref 3.5–11.3)

## 2019-06-28 PROCEDURE — 82040 ASSAY OF SERUM ALBUMIN: CPT

## 2019-06-28 PROCEDURE — 83735 ASSAY OF MAGNESIUM: CPT

## 2019-06-28 PROCEDURE — 80048 BASIC METABOLIC PNL TOTAL CA: CPT

## 2019-06-28 PROCEDURE — 83970 ASSAY OF PARATHORMONE: CPT

## 2019-06-28 PROCEDURE — 82550 ASSAY OF CK (CPK): CPT

## 2019-06-28 PROCEDURE — 82553 CREATINE MB FRACTION: CPT

## 2019-06-28 PROCEDURE — 85610 PROTHROMBIN TIME: CPT

## 2019-06-28 PROCEDURE — 85027 COMPLETE CBC AUTOMATED: CPT

## 2019-06-28 PROCEDURE — 84100 ASSAY OF PHOSPHORUS: CPT

## 2019-06-28 PROCEDURE — 36415 COLL VENOUS BLD VENIPUNCTURE: CPT

## 2019-06-28 PROCEDURE — 82306 VITAMIN D 25 HYDROXY: CPT

## 2019-06-29 ENCOUNTER — HOSPITAL ENCOUNTER (OUTPATIENT)
Age: 64
Setting detail: SPECIMEN
Discharge: HOME | End: 2019-06-29
Payer: MEDICARE

## 2019-06-29 LAB
-: ABNORMAL
AMORPHOUS: ABNORMAL
BACTERIA: ABNORMAL
BILIRUBIN URINE: NEGATIVE
CASTS UA: ABNORMAL /LPF
COLOR: YELLOW
COMMENT UA: ABNORMAL
CREATININE URINE: 43.3 MG/DL (ref 28–217)
CRYSTALS, UA: ABNORMAL /HPF
EPITHELIAL CELLS UA: ABNORMAL /HPF (ref 0–25)
GLUCOSE URINE: NEGATIVE
KETONES, URINE: NEGATIVE
LEUKOCYTE ESTERASE, URINE: ABNORMAL
MUCUS: ABNORMAL
NITRITE, URINE: NEGATIVE
OTHER OBSERVATIONS UA: ABNORMAL
PH UA: 6 (ref 5–9)
PROTEIN UA: ABNORMAL
RBC UA: ABNORMAL /HPF (ref 0–2)
RENAL EPITHELIAL, UA: ABNORMAL /HPF
SPECIFIC GRAVITY UA: 1.01 (ref 1.01–1.02)
TOTAL PROTEIN, URINE: 46 MG/DL
TRICHOMONAS: ABNORMAL
TURBIDITY: CLEAR
URINE HGB: ABNORMAL
URINE TOTAL PROTEIN CREATININE RATIO: 1.06 (ref 0–0.2)
UROBILINOGEN, URINE: NORMAL
WBC UA: ABNORMAL /HPF (ref 0–5)
YEAST: ABNORMAL

## 2019-06-29 PROCEDURE — 81001 URINALYSIS AUTO W/SCOPE: CPT

## 2019-06-29 PROCEDURE — 82570 ASSAY OF URINE CREATININE: CPT

## 2019-06-29 PROCEDURE — 87086 URINE CULTURE/COLONY COUNT: CPT

## 2019-06-29 PROCEDURE — 84156 ASSAY OF PROTEIN URINE: CPT

## 2019-07-01 LAB
CULTURE: NORMAL
Lab: NORMAL
SPECIMEN DESCRIPTION: NORMAL

## 2019-07-08 ENCOUNTER — HOSPITAL ENCOUNTER (OUTPATIENT)
Age: 64
Discharge: HOME | End: 2019-07-08
Payer: MEDICARE

## 2019-07-08 LAB
INR BLD: 1.8 (ref 0.9–1.2)
PROTHROMBIN TIME: 18.4 SEC (ref 9.7–12.2)

## 2019-07-08 PROCEDURE — 36415 COLL VENOUS BLD VENIPUNCTURE: CPT

## 2019-07-08 PROCEDURE — 85610 PROTHROMBIN TIME: CPT

## 2019-08-29 ENCOUNTER — HOSPITAL ENCOUNTER (OUTPATIENT)
Dept: PHARMACY | Age: 64
Setting detail: THERAPIES SERIES
Discharge: HOME | End: 2019-08-29
Payer: MEDICARE

## 2019-08-29 VITALS
WEIGHT: 219 LBS | SYSTOLIC BLOOD PRESSURE: 128 MMHG | DIASTOLIC BLOOD PRESSURE: 88 MMHG | BODY MASS INDEX: 45.97 KG/M2 | HEART RATE: 85 BPM

## 2019-08-29 DIAGNOSIS — Z79.01 LONG-TERM (CURRENT) USE OF ANTICOAGULANTS, INR GOAL 2.0-3.0: ICD-10-CM

## 2019-08-29 LAB — INR BLD: 1.7

## 2019-08-29 PROCEDURE — 99211 OFF/OP EST MAY X REQ PHY/QHP: CPT

## 2019-08-29 PROCEDURE — 99212 OFFICE O/P EST SF 10 MIN: CPT

## 2019-08-29 PROCEDURE — 85610 PROTHROMBIN TIME: CPT

## 2019-08-29 RX ORDER — ASCORBIC ACID 125 MG
1 TABLET,CHEWABLE ORAL 2 TIMES DAILY
COMMUNITY
End: 2019-10-04 | Stop reason: ALTCHOICE

## 2019-08-29 RX ORDER — DEXAMETHASONE 4 MG/1
4 TABLET ORAL DAILY
COMMUNITY
Start: 2019-04-27 | End: 2019-10-04 | Stop reason: ALTCHOICE

## 2019-09-13 ENCOUNTER — HOSPITAL ENCOUNTER (OUTPATIENT)
Dept: PHARMACY | Age: 64
Setting detail: THERAPIES SERIES
Discharge: HOME | End: 2019-09-13
Payer: MEDICARE

## 2019-09-13 VITALS — SYSTOLIC BLOOD PRESSURE: 147 MMHG | HEART RATE: 51 BPM | DIASTOLIC BLOOD PRESSURE: 75 MMHG

## 2019-09-13 DIAGNOSIS — Z79.01 LONG-TERM (CURRENT) USE OF ANTICOAGULANTS, INR GOAL 2.0-3.0: ICD-10-CM

## 2019-09-13 LAB — INR BLD: 2.3

## 2019-09-13 PROCEDURE — 99211 OFF/OP EST MAY X REQ PHY/QHP: CPT

## 2019-09-13 PROCEDURE — 85610 PROTHROMBIN TIME: CPT

## 2019-09-13 NOTE — PROGRESS NOTES
Patient states no visible blood in urine and no black tarry stool. No change in other medications. Will return to clinic in 3 weeks.

## 2019-10-04 ENCOUNTER — HOSPITAL ENCOUNTER (OUTPATIENT)
Dept: PHARMACY | Age: 64
Setting detail: THERAPIES SERIES
Discharge: HOME | End: 2019-10-04
Payer: MEDICARE

## 2019-10-04 VITALS
HEART RATE: 62 BPM | SYSTOLIC BLOOD PRESSURE: 141 MMHG | WEIGHT: 213 LBS | DIASTOLIC BLOOD PRESSURE: 88 MMHG | BODY MASS INDEX: 44.71 KG/M2

## 2019-10-04 DIAGNOSIS — Z79.01 LONG-TERM (CURRENT) USE OF ANTICOAGULANTS, INR GOAL 2.0-3.0: ICD-10-CM

## 2019-10-04 LAB — INR BLD: 2.2

## 2019-10-04 PROCEDURE — 99211 OFF/OP EST MAY X REQ PHY/QHP: CPT

## 2019-10-04 PROCEDURE — 85610 PROTHROMBIN TIME: CPT

## 2019-11-08 ENCOUNTER — HOSPITAL ENCOUNTER (OUTPATIENT)
Dept: PHARMACY | Age: 64
Setting detail: THERAPIES SERIES
Discharge: HOME | End: 2019-11-08
Payer: MEDICARE

## 2019-11-08 VITALS
BODY MASS INDEX: 46.26 KG/M2 | SYSTOLIC BLOOD PRESSURE: 157 MMHG | HEART RATE: 56 BPM | DIASTOLIC BLOOD PRESSURE: 93 MMHG | WEIGHT: 220.4 LBS

## 2019-11-08 DIAGNOSIS — Z79.01 LONG-TERM (CURRENT) USE OF ANTICOAGULANTS, INR GOAL 2.0-3.0: ICD-10-CM

## 2019-11-08 LAB — INR BLD: 1.9

## 2019-11-08 PROCEDURE — 99211 OFF/OP EST MAY X REQ PHY/QHP: CPT

## 2019-11-08 PROCEDURE — 85610 PROTHROMBIN TIME: CPT

## 2019-11-24 PROCEDURE — 85610 PROTHROMBIN TIME: CPT

## 2019-11-24 PROCEDURE — 99212 OFFICE O/P EST SF 10 MIN: CPT

## 2019-11-25 ENCOUNTER — HOSPITAL ENCOUNTER (OUTPATIENT)
Dept: PHARMACY | Age: 64
Setting detail: THERAPIES SERIES
Discharge: HOME | End: 2019-11-25
Payer: MEDICARE

## 2019-11-25 VITALS
DIASTOLIC BLOOD PRESSURE: 78 MMHG | WEIGHT: 217 LBS | BODY MASS INDEX: 45.55 KG/M2 | HEART RATE: 51 BPM | SYSTOLIC BLOOD PRESSURE: 151 MMHG

## 2019-11-25 DIAGNOSIS — Z79.01 LONG-TERM (CURRENT) USE OF ANTICOAGULANTS, INR GOAL 2.0-3.0: ICD-10-CM

## 2019-11-25 LAB — INR BLD: 3.4

## 2019-12-16 ENCOUNTER — HOSPITAL ENCOUNTER (OUTPATIENT)
Dept: PHARMACY | Age: 64
Setting detail: THERAPIES SERIES
Discharge: HOME | End: 2019-12-16
Payer: MEDICARE

## 2019-12-16 VITALS — BODY MASS INDEX: 45.76 KG/M2 | WEIGHT: 218 LBS

## 2019-12-16 DIAGNOSIS — Z79.01 LONG-TERM (CURRENT) USE OF ANTICOAGULANTS, INR GOAL 2.0-3.0: ICD-10-CM

## 2019-12-16 LAB — INR BLD: 2.5

## 2019-12-16 PROCEDURE — 85610 PROTHROMBIN TIME: CPT | Performed by: FAMILY MEDICINE

## 2019-12-16 PROCEDURE — 99211 OFF/OP EST MAY X REQ PHY/QHP: CPT | Performed by: FAMILY MEDICINE

## 2020-01-24 ENCOUNTER — HOSPITAL ENCOUNTER (OUTPATIENT)
Dept: PHARMACY | Age: 65
Setting detail: THERAPIES SERIES
Discharge: HOME | End: 2020-01-24
Payer: MEDICARE

## 2020-01-24 VITALS
DIASTOLIC BLOOD PRESSURE: 73 MMHG | BODY MASS INDEX: 44.71 KG/M2 | HEART RATE: 54 BPM | SYSTOLIC BLOOD PRESSURE: 147 MMHG | WEIGHT: 213 LBS

## 2020-01-24 LAB — INR BLD: 2

## 2020-01-24 PROCEDURE — 85610 PROTHROMBIN TIME: CPT

## 2020-01-24 PROCEDURE — 99211 OFF/OP EST MAY X REQ PHY/QHP: CPT

## 2020-02-12 ENCOUNTER — ANTI-COAG VISIT (OUTPATIENT)
Dept: PHARMACY | Age: 65
End: 2020-02-12

## 2020-02-12 NOTE — PROGRESS NOTES
Called new RX into [facility] for warfarin 3 mg tablets take 1 tablet daily or as directed #90 with 3 refills per Dr Lisette Hearn.   Denilson Krishnan, PharmD 2/12/2020 3:06 PM

## 2020-03-03 ENCOUNTER — HOSPITAL ENCOUNTER (OUTPATIENT)
Dept: PHARMACY | Age: 65
Setting detail: THERAPIES SERIES
Discharge: HOME | End: 2020-03-03
Payer: MEDICARE

## 2020-03-03 VITALS
BODY MASS INDEX: 46.1 KG/M2 | SYSTOLIC BLOOD PRESSURE: 142 MMHG | WEIGHT: 219.6 LBS | HEART RATE: 61 BPM | DIASTOLIC BLOOD PRESSURE: 68 MMHG

## 2020-03-03 LAB — INR BLD: 1.1

## 2020-03-03 PROCEDURE — 99212 OFFICE O/P EST SF 10 MIN: CPT | Performed by: FAMILY MEDICINE

## 2020-03-03 PROCEDURE — 85610 PROTHROMBIN TIME: CPT | Performed by: FAMILY MEDICINE

## 2020-03-03 PROCEDURE — 99211 OFF/OP EST MAY X REQ PHY/QHP: CPT | Performed by: FAMILY MEDICINE

## 2020-03-03 NOTE — PATIENT INSTRUCTIONS
Please take 2 tablets (6mg) today and tomorrow then return to your regular dosing. Continue to monitor for signs of bleeding. Return to coumadin clinic in 2 weeks.

## 2020-03-03 NOTE — PROGRESS NOTES
Fingerstick INR drawn per clinic protocol. Patient states no visible blood in urine and no black tarry stool. Denies any missed doses of warfarin. No change in other maintenance medications or in diet. Will continue current warfarin regimen and recheck INR in 2 week(s). Dmitry Garnica and daughter John Fairbanks are present for appointment. Patient denies missed doses but daughter states patient was out of medication for a period of time. Patient and daughter are questioning how long she needs to remain on warfarin-initially referred to clinic with 6 month consult from Dr Sherrie Rashid. I have contacted Dr Artur Salcido office for clarification as he is her PCP and they state they have had no follow up with Dr Sherrie Rashid. Dr Naveed Jones states his office will contact Dr Tamra Buerger office for a re-eval and let daughter know of this plan. Dmitry Garnica believes her medication is the incorrect \"color\" so daughter is verifying this and will call office back to update. Daughter feels patient is \"just confused\". Patient to take warfarin 6mg today and tomorrow then resume previous dosing of 3mg daily and recheck INR in 2 weeks. Patient acknowledges working in consult agreement with pharmacist as referred by his/her physician.

## 2020-03-04 NOTE — PROGRESS NOTES
Patient's daughter left a message on voicemail and warfarin color is brown which is correct for 3 mg tablet strength.

## 2020-03-17 ENCOUNTER — HOSPITAL ENCOUNTER (OUTPATIENT)
Dept: PHARMACY | Age: 65
Setting detail: THERAPIES SERIES
Discharge: HOME | End: 2020-03-17
Payer: MEDICARE

## 2020-03-17 VITALS
SYSTOLIC BLOOD PRESSURE: 155 MMHG | BODY MASS INDEX: 45.68 KG/M2 | HEART RATE: 48 BPM | WEIGHT: 217.6 LBS | DIASTOLIC BLOOD PRESSURE: 71 MMHG

## 2020-03-17 LAB — INR BLD: 1.3

## 2020-03-17 PROCEDURE — 99212 OFFICE O/P EST SF 10 MIN: CPT | Performed by: FAMILY MEDICINE

## 2020-03-17 PROCEDURE — 85610 PROTHROMBIN TIME: CPT | Performed by: FAMILY MEDICINE

## 2020-03-17 NOTE — PATIENT INSTRUCTIONS
Please take 2 tablets (6mg) warfarin today then increase your dosing to take warfarin 4.5mg (1 1/2 tablets) on Fridays and 1 tablet (3mg) all other days. Continue to monitor for signs of bleeding. Return to coumadin clinic in 2 weeks.

## 2020-03-17 NOTE — PROGRESS NOTES
Fingerstick INR drawn per clinic protocol. Patient states no visible blood in urine and no black tarry stool. Denies any missed doses of warfarin. No change in other maintenance medications or in diet. Will increase current warfarin regimen and recheck INR in 2 week(s). Patient and daughter state neither have any coughing, respiratory symptoms of fever today. Patient states she has not missed any doses of warfarin but is a poor historian. Daughter states she forgot to double check the pillminder to see if medications were remaining. Patient instructed to take 6mg warfarin today then increase weekly dosage 7% to 4.5mg on Fridays and 3mg all other days. Patient acknowledges working in consult agreement with pharmacist as referred by his/her physician.       CLINICAL PHARMACY CONSULT: MED RECONCILIATION/REVIEW ADDENDUM    For Pharmacy Admin Tracking Only    PHSO: No  Total # of Interventions Recommended: 1  - Increased Dose #: 11  - Maintenance Safety Lab Monitoring #: 1  -adjusted medication list to reflect change in dosing of tegretorl    Total Interventions Accepted: 1  Time Spent (min): [address], PharmD

## 2020-03-31 ENCOUNTER — HOSPITAL ENCOUNTER (OUTPATIENT)
Dept: PHARMACY | Age: 65
Setting detail: THERAPIES SERIES
Discharge: HOME | End: 2020-03-31
Payer: MEDICARE

## 2020-03-31 LAB
INR REFERENCE RANGE:: ABNORMAL
POC INR: 1.9 (ref 0.9–1.2)

## 2020-03-31 PROCEDURE — 85610 PROTHROMBIN TIME: CPT

## 2020-03-31 NOTE — PROGRESS NOTES
Fingerstick INR performed by [facility] with result of 1.9. Communication is via telephone with patient's daughter Jon Rhodes. Patient's daughter states no visible blood in urine and no black tarry stool. Denies any missed doses of warfarin. No change in other maintenance medications. Patient has been eating Healthy Choice meals with broccoli. Patient will take warfarin 6 mg today. Will continue current warfarin regimen of 4.5 mg on Friday and 3 mg all other days and recheck INR in 3 week(s). Patient's daughter verbalizes via telephone call understanding of warfarin dosing instructions. CLINICAL PHARMACY CONSULT: MED RECONCILIATION/REVIEW ADDENDUM    For Pharmacy Admin Tracking Only    PHSO: No  Total # of Interventions Recommended: 1  - Updated Order #: 1 Updated Order Reason(s):  Medication  - Maintenance Safety Lab Monitoring #: 1  Total Interventions Accepted: 2  Time Spent (min):  [address] [place], [facility] [address]                  Patient verbalizes via telephone call understanding of warfarin dosing instructions.

## 2020-04-20 ENCOUNTER — TELEPHONE (OUTPATIENT)
Dept: PHARMACY | Age: 65
End: 2020-04-20

## 2020-04-21 ENCOUNTER — HOSPITAL ENCOUNTER (OUTPATIENT)
Dept: PHARMACY | Age: 65
Setting detail: THERAPIES SERIES
Discharge: HOME | End: 2020-04-21
Payer: MEDICARE

## 2020-04-21 LAB
INR BLD: 2.1
INR REFERENCE RANGE:: ABNORMAL
POC INR: 2.1 (ref 0.9–1.2)

## 2020-04-21 PROCEDURE — 85610 PROTHROMBIN TIME: CPT

## 2020-04-21 NOTE — PROGRESS NOTES
Fingerstick INR performed by [facility] with result of 2.1. Communication is via telephone with patient's daughter Candy Zamora. Patient's daughter states no visible blood in urine and no black tarry stool. Denies any missed doses of warfarin. No change in other maintenance medications. Patient stopped eating the broccoli in her Healthy Choice meals. Will continue current warfarin regimen of 4.5 mg on Friday and 3 mg all other days and recheck INR in 5 week(s). Patient's daughter verbalizes via telephone call understanding of warfarin dosing instructions. CLINICAL PHARMACY CONSULT: MED RECONCILIATION/REVIEW ADDENDUM    For Pharmacy Admin Tracking Only    PHSO: No  Total # of Interventions Recommended: 0  - Maintenance Safety Lab Monitoring #: 1    Total Interventions Accepted: 1  Time Spent (min): [address]  [address]

## 2020-05-26 ENCOUNTER — TELEPHONE (OUTPATIENT)
Dept: PHARMACY | Age: 65
End: 2020-05-26

## 2020-05-28 ENCOUNTER — TELEPHONE (OUTPATIENT)
Dept: PHARMACY | Age: 65
End: 2020-05-28

## 2020-05-28 NOTE — TELEPHONE ENCOUNTER
Recent Travel Screening and Travel History documentation:     Travel Screening       Question Response     Do you have any of the following symptoms? None of these     In the last month, have you been in contact with someone who was confirmed or suspected to have Coronavirus / COVID-19? No / Unsure     Have you traveled internationally in the last month? No      Travel History   Travel since 04/28/20     No documented travel since 04/28/20         Patient denies fever and respiratory symptoms and understands that it will be a CURBSIDE visit at the main hospital entrance and to stay in her car.     Edmond Esparza, PharmD 5/28/2020 12:32 PM

## 2020-05-29 ENCOUNTER — HOSPITAL ENCOUNTER (OUTPATIENT)
Dept: PHARMACY | Age: 65
Setting detail: THERAPIES SERIES
Discharge: HOME | End: 2020-05-29
Payer: MEDICARE

## 2020-05-29 VITALS — TEMPERATURE: 97.9 F

## 2020-05-29 LAB — INR BLD: 1.5

## 2020-05-29 PROCEDURE — 99212 OFFICE O/P EST SF 10 MIN: CPT

## 2020-05-29 PROCEDURE — 85610 PROTHROMBIN TIME: CPT

## 2020-05-29 NOTE — PROGRESS NOTES
Patient states no visible blood in urine and no black tarry stool. No change in other medications. Will return to clinic in 2 weeks. Patient is eating broccoli in frozen meals and will continue. Increase warfarin to 1.5 tablets for 4.5 mg on Mondays and Friday and 3 mg all other days. Saw patient CURBSIDE with daughter Chelsey Nelson driving. CLINICAL PHARMACY CONSULT: MED RECONCILIATION/REVIEW ADDENDUM    For Pharmacy Admin Tracking Only    PHSO: No  Total # of Interventions Recommended: 1  - Increased Dose #: 1  - Maintenance Safety Lab Monitoring #: 1  Recommended intervention potential cost savings:   Accepted intervention potential cost savings:    Total Interventions Accepted: 1  Time Spent (min): 30    Gaurav WhalenD

## 2020-06-11 ENCOUNTER — TELEPHONE (OUTPATIENT)
Dept: PHARMACY | Age: 65
End: 2020-06-11

## 2020-06-11 NOTE — TELEPHONE ENCOUNTER
Recent Travel Screening and Travel History documentation:     Travel Screening       Question Response     Have you been in contact with someone who was sick? Do you have any of the following symptoms? Unable to assess     In the last month, have you been in contact with someone who was confirmed or suspected to have Coronavirus / COVID-19? Unable to assess     Have you traveled internationally in the last month? Unable to assess      Travel History   Travel since 05/11/20     No documented travel since 05/11/20         Had to leave voicemail for patient to reschedule of any fever or respiratory symptoms and informed that it will be a CURBSIDE visit at the main hospital entrance and to stay in their car.     Josseline Schumacher, PharmD 6/11/2020 9:20 AM

## 2020-06-12 ENCOUNTER — HOSPITAL ENCOUNTER (OUTPATIENT)
Dept: PHARMACY | Age: 65
Setting detail: THERAPIES SERIES
Discharge: HOME | End: 2020-06-12
Payer: MEDICARE

## 2020-06-12 VITALS — TEMPERATURE: 98.7 F

## 2020-06-12 LAB — INR BLD: 2.8

## 2020-06-12 PROCEDURE — 85610 PROTHROMBIN TIME: CPT

## 2020-06-12 PROCEDURE — 99211 OFF/OP EST MAY X REQ PHY/QHP: CPT

## 2020-06-12 NOTE — PROGRESS NOTES
Patient states minor visible blood in urine. No change in other medications. Daughter, Gabby Chaidez, accompanies patient and states patient has history of vaginal bleeding. Daughter to follow up with referring physician for evaluation of continued warfarin use (as initial duration of warfarin therapy was 6 months - per faxed referral - and patient started warfarin 8/2019) and notification of blood in urine. Will return to clinic in 4 weeks. Patient will continue warfarin dosage of warfarin 4.5 mg Mon, Fri; 3 mg all other days. Saw patient ALEXANDRU. CLINICAL PHARMACY CONSULT: MED RECONCILIATION/REVIEW ADDENDUM    For Pharmacy Admin Tracking Only    PHSO: No  Total # of Interventions Recommended: 1  - Updated Order #: 0 Updated Order Reason(s): Other  - Maintenance Safety Lab Monitoring #: 1  Total Interventions Accepted: 1  Time Spent (min): [address]  [address] [place] [address]

## 2020-07-08 ENCOUNTER — TELEPHONE (OUTPATIENT)
Dept: PHARMACY | Age: 65
End: 2020-07-08

## 2020-07-08 NOTE — TELEPHONE ENCOUNTER
Recent Travel Screening and Travel History documentation:     Travel Screening       Question Response     Have you been in contact with someone who was sick? Do you have any of the following symptoms? Unable to assess     In the last month, have you been in contact with someone who was confirmed or suspected to have Coronavirus / COVID-19? Unable to assess     Have you traveled internationally in the last month? Unable to assess      Travel History   Travel since 06/08/20     No documented travel since 06/08/20         Had to leave [facility] for daughter, Peterson Ji to reschedule her mom if any fever or respiratory symptoms and informed that it will be a CURBSIDE visit at the main hospital entrance and to stay in the car.     Hellen Vega, PharmD 7/8/2020 8:45 AM

## 2020-07-09 ENCOUNTER — HOSPITAL ENCOUNTER (OUTPATIENT)
Dept: PHARMACY | Age: 65
Setting detail: THERAPIES SERIES
Discharge: HOME | End: 2020-07-09
Payer: MEDICARE

## 2020-07-09 VITALS — TEMPERATURE: 98.1 F

## 2020-07-09 LAB — INR BLD: 1.9

## 2020-07-09 PROCEDURE — 85610 PROTHROMBIN TIME: CPT

## 2020-07-09 PROCEDURE — 99211 OFF/OP EST MAY X REQ PHY/QHP: CPT

## 2020-07-09 NOTE — PROGRESS NOTES
Patient states no visible blood in urine and no black tarry stool. No change in other medications. Will return to clinic in 5 weeks. Patient is eating broccoli in frozen meals and will continue. Daughter, Bo Boast, accompanies patient and states patient has history of vaginal bleeding. Daughter to follow up with referring physician for evaluation of continued warfarin use (as initial duration of warfarin therapy was 6 months - per faxed referral - and patient started warfarin 8/2019) and notification of blood in urine. Patient is getting established with Dr Tolu Frazier due to kidney's failing. Patient will take 4.5 mg today then continue warfarin 1.5 tablets for 4.5 mg on Mondays and Friday and whole 3 mg tablet all other days. Saw patient CURBSIDE with daughter Bo Boast driving. CLINICAL PHARMACY CONSULT: MED RECONCILIATION/REVIEW ADDENDUM    For Pharmacy Admin Tracking Only    PHSO: No  Total # of Interventions Recommended: 1  - Increased Dose #: 1  - Maintenance Safety Lab Monitoring #: 1  Recommended intervention potential cost savings:   Accepted intervention potential cost savings:    Total Interventions Accepted: 2  Time Spent (min): 30    Aleyda Vizcarra, GauravD

## 2020-07-09 NOTE — PATIENT INSTRUCTIONS
Continue to monitor urine and stool. Continue to monitor for signs of bleeding. Return to clinic in 5 weeks. Take 4.5 mg today then continue warfarin 1.5 tablets for 4.5 mg on Mondays and Friday and whole 3 mg tablet all other days.

## 2020-07-14 ENCOUNTER — HOSPITAL ENCOUNTER (OUTPATIENT)
Dept: GENERAL RADIOLOGY | Age: 65
Discharge: HOME | End: 2020-07-16
Payer: MEDICARE

## 2020-07-14 ENCOUNTER — HOSPITAL ENCOUNTER (OUTPATIENT)
Dept: LAB | Age: 65
Discharge: HOME | End: 2020-07-14
Payer: MEDICARE

## 2020-07-14 LAB
HAV IGM SER IA-ACNC: NONREACTIVE
HEPATITIS B CORE IGM ANTIBODY: NONREACTIVE
HEPATITIS B SURFACE ANTIGEN: NONREACTIVE
HEPATITIS C ANTIBODY: NONREACTIVE

## 2020-07-14 PROCEDURE — 36415 COLL VENOUS BLD VENIPUNCTURE: CPT

## 2020-07-14 PROCEDURE — 71046 X-RAY EXAM CHEST 2 VIEWS: CPT

## 2020-07-14 PROCEDURE — 80074 ACUTE HEPATITIS PANEL: CPT

## 2020-08-06 ENCOUNTER — ANTI-COAG VISIT (OUTPATIENT)
Dept: PHARMACY | Age: 65
End: 2020-08-06

## 2020-08-06 PROBLEM — Z79.01 LONG-TERM (CURRENT) USE OF ANTICOAGULANTS, INR GOAL 2.0-3.0: Status: RESOLVED | Noted: 2019-08-29 | Resolved: 2020-08-06

## 2020-08-06 NOTE — PROGRESS NOTES
Discontinue warfarin per Dr Mayank Hudson MD [address] as patient has completed a year of warfarin post provoked DVT. Faxed discharge letter to Dr Lynnette Mendoza, referring doctor.   Jesse Harrison PharmD 8/6/2020 11:33 AM

## 2021-01-01 ENCOUNTER — HOSPITAL ENCOUNTER (INPATIENT)
Age: 66
LOS: 3 days | DRG: 177 | End: 2021-09-24
Attending: FAMILY MEDICINE | Admitting: INTERNAL MEDICINE
Payer: MEDICARE

## 2021-01-01 ENCOUNTER — APPOINTMENT (OUTPATIENT)
Dept: GENERAL RADIOLOGY | Age: 66
DRG: 177 | End: 2021-01-01
Payer: MEDICARE

## 2021-01-01 VITALS
WEIGHT: 202.9 LBS | BODY MASS INDEX: 42.59 KG/M2 | OXYGEN SATURATION: 86 % | SYSTOLIC BLOOD PRESSURE: 55 MMHG | DIASTOLIC BLOOD PRESSURE: 32 MMHG | HEART RATE: 80 BPM | RESPIRATION RATE: 25 BRPM | HEIGHT: 58 IN | TEMPERATURE: 97.2 F

## 2021-01-01 DIAGNOSIS — U07.1 PNEUMONIA DUE TO COVID-19 VIRUS: ICD-10-CM

## 2021-01-01 DIAGNOSIS — G93.49 UREMIC ENCEPHALOPATHY: Primary | ICD-10-CM

## 2021-01-01 DIAGNOSIS — N19 UREMIC ENCEPHALOPATHY: Primary | ICD-10-CM

## 2021-01-01 DIAGNOSIS — N17.9 ACUTE RENAL FAILURE, UNSPECIFIED ACUTE RENAL FAILURE TYPE (HCC): ICD-10-CM

## 2021-01-01 DIAGNOSIS — J12.82 PNEUMONIA DUE TO COVID-19 VIRUS: ICD-10-CM

## 2021-01-01 DIAGNOSIS — U07.1 COVID-19: ICD-10-CM

## 2021-01-01 LAB
ABSOLUTE EOS #: 0 K/UL (ref 0–0.44)
ABSOLUTE IMMATURE GRANULOCYTE: 0 K/UL (ref 0–0.3)
ABSOLUTE LYMPH #: 0.41 K/UL (ref 1.1–3.7)
ABSOLUTE MONO #: 0.28 K/UL (ref 0.1–1.2)
ALBUMIN SERPL-MCNC: 3.7 G/DL (ref 3.5–5.2)
ALBUMIN/GLOBULIN RATIO: 1 (ref 1–2.5)
ALLEN TEST: ABNORMAL
ALLEN TEST: ABNORMAL
ALP BLD-CCNC: 83 U/L (ref 35–104)
ALT SERPL-CCNC: 14 U/L (ref 5–33)
AMMONIA: 22 UMOL/L (ref 11–41)
ANION GAP SERPL CALCULATED.3IONS-SCNC: 28 MMOL/L (ref 9–17)
ANION GAP SERPL CALCULATED.3IONS-SCNC: 28 MMOL/L (ref 9–17)
AST SERPL-CCNC: 14 U/L
BASOPHILS # BLD: 0 % (ref 0–2)
BASOPHILS ABSOLUTE: 0 K/UL (ref 0–0.2)
BILIRUB SERPL-MCNC: 0.26 MG/DL (ref 0.3–1.2)
BNP INTERPRETATION: ABNORMAL
BUN BLDV-MCNC: 140 MG/DL (ref 8–23)
BUN BLDV-MCNC: 155 MG/DL (ref 8–23)
BUN/CREAT BLD: 10 (ref 9–20)
BUN/CREAT BLD: 11 (ref 9–20)
CALCIUM SERPL-MCNC: 10 MG/DL (ref 8.6–10.4)
CALCIUM SERPL-MCNC: 10.3 MG/DL (ref 8.6–10.4)
CARBOXYHEMOGLOBIN: ABNORMAL % (ref 0–5)
CARBOXYHEMOGLOBIN: ABNORMAL % (ref 0–5)
CHLORIDE BLD-SCNC: 106 MMOL/L (ref 98–107)
CHLORIDE BLD-SCNC: 108 MMOL/L (ref 98–107)
CO2: 7 MMOL/L (ref 20–31)
CO2: 8 MMOL/L (ref 20–31)
CREAT SERPL-MCNC: 13.57 MG/DL (ref 0.5–0.9)
CREAT SERPL-MCNC: 13.61 MG/DL (ref 0.5–0.9)
CULTURE: ABNORMAL
DIFFERENTIAL TYPE: ABNORMAL
EKG ATRIAL RATE: 84 BPM
EKG P AXIS: 31 DEGREES
EKG P-R INTERVAL: 166 MS
EKG Q-T INTERVAL: 352 MS
EKG QRS DURATION: 88 MS
EKG QTC CALCULATION (BAZETT): 415 MS
EKG R AXIS: -7 DEGREES
EKG T AXIS: 22 DEGREES
EKG VENTRICULAR RATE: 84 BPM
EOSINOPHILS RELATIVE PERCENT: 0 % (ref 1–4)
FIO2: ABNORMAL
FIO2: ABNORMAL
GFR AFRICAN AMERICAN: 3 ML/MIN
GFR AFRICAN AMERICAN: 3 ML/MIN
GFR NON-AFRICAN AMERICAN: 3 ML/MIN
GFR NON-AFRICAN AMERICAN: 3 ML/MIN
GFR SERPL CREATININE-BSD FRML MDRD: ABNORMAL ML/MIN/{1.73_M2}
GLUCOSE BLD-MCNC: 104 MG/DL (ref 70–99)
GLUCOSE BLD-MCNC: 93 MG/DL (ref 70–99)
HCO3 ARTERIAL: 6.9 MMOL/L (ref 22–26)
HCO3 VENOUS: 9.6 MMOL/L (ref 24–30)
HCT VFR BLD CALC: 31.1 % (ref 36.3–47.1)
HEMOGLOBIN: 9.9 G/DL (ref 11.9–15.1)
IMMATURE GRANULOCYTES: 0 %
INR BLD: 1.2
LACTIC ACID, WHOLE BLOOD: NORMAL MMOL/L (ref 0.7–2.1)
LACTIC ACID: 0.8 MMOL/L (ref 0.5–2.2)
LYMPHOCYTES # BLD: 3 % (ref 24–43)
Lab: ABNORMAL
MCH RBC QN AUTO: 30.8 PG (ref 25.2–33.5)
MCHC RBC AUTO-ENTMCNC: 31.8 G/DL (ref 28.4–34.8)
MCV RBC AUTO: 96.9 FL (ref 82.6–102.9)
METHEMOGLOBIN: ABNORMAL % (ref 0–1.9)
METHEMOGLOBIN: ABNORMAL % (ref 0–1.9)
MODE: ABNORMAL
MODE: ABNORMAL
MONOCYTES # BLD: 2 % (ref 3–12)
MORPHOLOGY: NORMAL
MYOGLOBIN: 309 NG/ML (ref 25–58)
NEGATIVE BASE EXCESS, ART: 20.1 MMOL/L (ref 0–2)
NEGATIVE BASE EXCESS, VEN: 18.4 MMOL/L (ref 0–2)
NOTIFICATION TIME: ABNORMAL
NOTIFICATION TIME: ABNORMAL
NOTIFICATION: ABNORMAL
NOTIFICATION: ABNORMAL
NRBC AUTOMATED: 0 PER 100 WBC
O2 DEVICE/FLOW/%: ABNORMAL
O2 DEVICE/FLOW/%: ABNORMAL
O2 SAT, ARTERIAL: 96.8 % (ref 95–98)
O2 SAT, VEN: 51.1 % (ref 60–85)
OXYHEMOGLOBIN: ABNORMAL % (ref 95–98)
OXYHEMOGLOBIN: ABNORMAL % (ref 95–98)
PATIENT TEMP: 37
PATIENT TEMP: 37
PCO2 ARTERIAL: 20.4 MMHG (ref 35–45)
PCO2, ART, TEMP ADJ: ABNORMAL
PCO2, VEN, TEMP ADJ: ABNORMAL MMHG (ref 39–55)
PCO2, VEN: 30 (ref 39–55)
PDW BLD-RTO: 12.9 % (ref 11.8–14.4)
PEEP/CPAP: ABNORMAL
PEEP/CPAP: ABNORMAL
PH ARTERIAL: 7.14 (ref 7.35–7.45)
PH VENOUS: 7.12 (ref 7.32–7.42)
PH, ART, TEMP ADJ: ABNORMAL (ref 7.35–7.45)
PH, VEN, TEMP ADJ: ABNORMAL (ref 7.32–7.42)
PLATELET # BLD: 124 K/UL (ref 138–453)
PLATELET ESTIMATE: ABNORMAL
PMV BLD AUTO: 11.1 FL (ref 8.1–13.5)
PO2 ARTERIAL: 111.2 MMHG (ref 80–100)
PO2, ART, TEMP ADJ: ABNORMAL MMHG (ref 80–100)
PO2, VEN, TEMP ADJ: ABNORMAL MMHG (ref 30–50)
PO2, VEN: 35.2 (ref 30–50)
POSITIVE BASE EXCESS, ART: ABNORMAL MMOL/L (ref 0–2)
POSITIVE BASE EXCESS, VEN: ABNORMAL MMOL/L (ref 0–2)
POTASSIUM SERPL-SCNC: 4.6 MMOL/L (ref 3.7–5.3)
POTASSIUM SERPL-SCNC: 4.7 MMOL/L (ref 3.7–5.3)
PRO-BNP: 1307 PG/ML
PROTHROMBIN TIME: 14.8 SEC (ref 11.5–14.2)
PSV: ABNORMAL
PSV: ABNORMAL
PT. POSITION: ABNORMAL
PT. POSITION: ABNORMAL
RBC # BLD: 3.21 M/UL (ref 3.95–5.11)
RBC # BLD: ABNORMAL 10*6/UL
RESPIRATORY RATE: ABNORMAL
RESPIRATORY RATE: ABNORMAL
SAMPLE SITE: ABNORMAL
SAMPLE SITE: ABNORMAL
SARS-COV-2, RAPID: DETECTED
SEG NEUTROPHILS: 95 % (ref 36–65)
SEGMENTED NEUTROPHILS ABSOLUTE COUNT: 13.11 K/UL (ref 1.5–8.1)
SET RATE: ABNORMAL
SET RATE: ABNORMAL
SODIUM BLD-SCNC: 141 MMOL/L (ref 135–144)
SODIUM BLD-SCNC: 144 MMOL/L (ref 135–144)
SPECIMEN DESCRIPTION: ABNORMAL
SPECIMEN DESCRIPTION: ABNORMAL
TEXT FOR RESPIRATORY: ABNORMAL
TEXT FOR RESPIRATORY: ABNORMAL
TOTAL CK: 51 U/L (ref 26–192)
TOTAL HB: ABNORMAL G/DL (ref 12–16)
TOTAL HB: ABNORMAL G/DL (ref 12–16)
TOTAL PROTEIN: 7.3 G/DL (ref 6.4–8.3)
TOTAL RATE: ABNORMAL
TOTAL RATE: ABNORMAL
TROPONIN INTERP: ABNORMAL
TROPONIN T: ABNORMAL NG/ML
TROPONIN, HIGH SENSITIVITY: 49 NG/L (ref 0–14)
VT: ABNORMAL
VT: ABNORMAL
WBC # BLD: 13.8 K/UL (ref 3.5–11.3)
WBC # BLD: ABNORMAL 10*3/UL

## 2021-01-01 PROCEDURE — 2500000003 HC RX 250 WO HCPCS: Performed by: FAMILY MEDICINE

## 2021-01-01 PROCEDURE — 6370000000 HC RX 637 (ALT 250 FOR IP): Performed by: NURSE PRACTITIONER

## 2021-01-01 PROCEDURE — 82550 ASSAY OF CK (CPK): CPT

## 2021-01-01 PROCEDURE — 87150 DNA/RNA AMPLIFIED PROBE: CPT

## 2021-01-01 PROCEDURE — 6360000002 HC RX W HCPCS: Performed by: FAMILY MEDICINE

## 2021-01-01 PROCEDURE — 94761 N-INVAS EAR/PLS OXIMETRY MLT: CPT

## 2021-01-01 PROCEDURE — 36415 COLL VENOUS BLD VENIPUNCTURE: CPT

## 2021-01-01 PROCEDURE — 71045 X-RAY EXAM CHEST 1 VIEW: CPT

## 2021-01-01 PROCEDURE — 1200000000 HC SEMI PRIVATE

## 2021-01-01 PROCEDURE — 87205 SMEAR GRAM STAIN: CPT

## 2021-01-01 PROCEDURE — 93005 ELECTROCARDIOGRAM TRACING: CPT | Performed by: FAMILY MEDICINE

## 2021-01-01 PROCEDURE — 80053 COMPREHEN METABOLIC PANEL: CPT

## 2021-01-01 PROCEDURE — 80048 BASIC METABOLIC PNL TOTAL CA: CPT

## 2021-01-01 PROCEDURE — 2580000003 HC RX 258: Performed by: FAMILY MEDICINE

## 2021-01-01 PROCEDURE — 83874 ASSAY OF MYOGLOBIN: CPT

## 2021-01-01 PROCEDURE — 82805 BLOOD GASES W/O2 SATURATION: CPT

## 2021-01-01 PROCEDURE — 83880 ASSAY OF NATRIURETIC PEPTIDE: CPT

## 2021-01-01 PROCEDURE — 96375 TX/PRO/DX INJ NEW DRUG ADDON: CPT

## 2021-01-01 PROCEDURE — 6360000002 HC RX W HCPCS: Performed by: INTERNAL MEDICINE

## 2021-01-01 PROCEDURE — 51702 INSERT TEMP BLADDER CATH: CPT

## 2021-01-01 PROCEDURE — 84484 ASSAY OF TROPONIN QUANT: CPT

## 2021-01-01 PROCEDURE — 93010 ELECTROCARDIOGRAM REPORT: CPT | Performed by: INTERNAL MEDICINE

## 2021-01-01 PROCEDURE — 87040 BLOOD CULTURE FOR BACTERIA: CPT

## 2021-01-01 PROCEDURE — 85610 PROTHROMBIN TIME: CPT

## 2021-01-01 PROCEDURE — 96365 THER/PROPH/DIAG IV INF INIT: CPT

## 2021-01-01 PROCEDURE — 85025 COMPLETE CBC W/AUTO DIFF WBC: CPT

## 2021-01-01 PROCEDURE — 96376 TX/PRO/DX INJ SAME DRUG ADON: CPT

## 2021-01-01 PROCEDURE — 83605 ASSAY OF LACTIC ACID: CPT

## 2021-01-01 PROCEDURE — 82140 ASSAY OF AMMONIA: CPT

## 2021-01-01 PROCEDURE — 99285 EMERGENCY DEPT VISIT HI MDM: CPT

## 2021-01-01 PROCEDURE — 87635 SARS-COV-2 COVID-19 AMP PRB: CPT

## 2021-01-01 PROCEDURE — C9803 HOPD COVID-19 SPEC COLLECT: HCPCS

## 2021-01-01 PROCEDURE — 96366 THER/PROPH/DIAG IV INF ADDON: CPT

## 2021-01-01 PROCEDURE — 94660 CPAP INITIATION&MGMT: CPT

## 2021-01-01 RX ORDER — 0.9 % SODIUM CHLORIDE 0.9 %
500 INTRAVENOUS SOLUTION INTRAVENOUS ONCE
Status: DISCONTINUED | OUTPATIENT
Start: 2021-01-01 | End: 2021-01-01

## 2021-01-01 RX ORDER — ONDANSETRON 4 MG/1
4 TABLET, ORALLY DISINTEGRATING ORAL EVERY 8 HOURS PRN
Status: DISCONTINUED | OUTPATIENT
Start: 2021-01-01 | End: 2021-01-01 | Stop reason: HOSPADM

## 2021-01-01 RX ORDER — ATROPINE SULFATE 10 MG/ML
3 SOLUTION/ DROPS OPHTHALMIC EVERY 30 MIN PRN
Status: DISCONTINUED | OUTPATIENT
Start: 2021-01-01 | End: 2021-01-01 | Stop reason: HOSPADM

## 2021-01-01 RX ORDER — ACETAMINOPHEN 650 MG/1
650 SUPPOSITORY RECTAL EVERY 6 HOURS PRN
Status: DISCONTINUED | OUTPATIENT
Start: 2021-01-01 | End: 2021-01-01 | Stop reason: HOSPADM

## 2021-01-01 RX ORDER — MORPHINE SULFATE 20 MG/ML
5 SOLUTION ORAL
Status: DISCONTINUED | OUTPATIENT
Start: 2021-01-01 | End: 2021-01-01 | Stop reason: HOSPADM

## 2021-01-01 RX ORDER — LORAZEPAM 2 MG/ML
1 CONCENTRATE ORAL EVERY 4 HOURS PRN
Status: DISCONTINUED | OUTPATIENT
Start: 2021-01-01 | End: 2021-01-01 | Stop reason: HOSPADM

## 2021-01-01 RX ORDER — DEXAMETHASONE SODIUM PHOSPHATE 10 MG/ML
10 INJECTION INTRAMUSCULAR; INTRAVENOUS ONCE
Status: COMPLETED | OUTPATIENT
Start: 2021-01-01 | End: 2021-01-01

## 2021-01-01 RX ORDER — 0.9 % SODIUM CHLORIDE 0.9 %
250 INTRAVENOUS SOLUTION INTRAVENOUS ONCE
Status: COMPLETED | OUTPATIENT
Start: 2021-01-01 | End: 2021-01-01

## 2021-01-01 RX ORDER — MORPHINE SULFATE 2 MG/ML
2 INJECTION, SOLUTION INTRAMUSCULAR; INTRAVENOUS EVERY 4 HOURS PRN
Status: DISCONTINUED | OUTPATIENT
Start: 2021-01-01 | End: 2021-01-01

## 2021-01-01 RX ORDER — ACETAMINOPHEN 325 MG/1
650 TABLET ORAL EVERY 6 HOURS PRN
Status: DISCONTINUED | OUTPATIENT
Start: 2021-01-01 | End: 2021-01-01 | Stop reason: HOSPADM

## 2021-01-01 RX ADMIN — SODIUM BICARBONATE: 84 INJECTION INTRAVENOUS at 03:25

## 2021-01-01 RX ADMIN — ATROPINE SULFATE 3 DROP: 10 SOLUTION/ DROPS OPHTHALMIC at 07:52

## 2021-01-01 RX ADMIN — Medication 5 MG: at 17:09

## 2021-01-01 RX ADMIN — Medication 5 MG: at 07:52

## 2021-01-01 RX ADMIN — Medication 5 MG: at 20:49

## 2021-01-01 RX ADMIN — ATROPINE SULFATE 3 DROP: 10 SOLUTION/ DROPS OPHTHALMIC at 20:49

## 2021-01-01 RX ADMIN — SODIUM CHLORIDE 250 ML: 9 INJECTION, SOLUTION INTRAVENOUS at 21:46

## 2021-01-01 RX ADMIN — MORPHINE SULFATE 2 MG: 2 INJECTION, SOLUTION INTRAMUSCULAR; INTRAVENOUS at 20:25

## 2021-01-01 RX ADMIN — ATROPINE SULFATE 3 DROP: 10 SOLUTION/ DROPS OPHTHALMIC at 06:41

## 2021-01-01 RX ADMIN — Medication 5 MG: at 14:13

## 2021-01-01 RX ADMIN — MORPHINE SULFATE 2 MG: 2 INJECTION, SOLUTION INTRAMUSCULAR; INTRAVENOUS at 04:53

## 2021-01-01 RX ADMIN — SODIUM BICARBONATE 50 MEQ: 84 INJECTION, SOLUTION INTRAVENOUS at 21:41

## 2021-01-01 RX ADMIN — Medication 1 MG: at 14:13

## 2021-01-01 RX ADMIN — Medication 5 MG: at 06:41

## 2021-01-01 RX ADMIN — DEXAMETHASONE SODIUM PHOSPHATE 10 MG: 10 INJECTION INTRAMUSCULAR; INTRAVENOUS at 21:13

## 2021-01-01 ASSESSMENT — ENCOUNTER SYMPTOMS
SHORTNESS OF BREATH: 1
ALLERGIC/IMMUNOLOGIC NEGATIVE: 1
STRIDOR: 0
CHEST TIGHTNESS: 1
EYES NEGATIVE: 1
CHOKING: 0
WHEEZING: 0
GASTROINTESTINAL NEGATIVE: 1
COUGH: 1
APNEA: 0

## 2021-01-01 ASSESSMENT — PAIN DESCRIPTION - PAIN TYPE: TYPE: ACUTE PAIN

## 2021-01-01 ASSESSMENT — PAIN SCALES - GENERAL
PAINLEVEL_OUTOF10: 0
PAINLEVEL_OUTOF10: 0
PAINLEVEL_OUTOF10: 4
PAINLEVEL_OUTOF10: 4
PAINLEVEL_OUTOF10: 0
PAINLEVEL_OUTOF10: 4
PAINLEVEL_OUTOF10: 0
PAINLEVEL_OUTOF10: 0
PAINLEVEL_OUTOF10: 4

## 2021-01-01 ASSESSMENT — PAIN DESCRIPTION - LOCATION: LOCATION: GENERALIZED

## 2021-09-20 NOTE — ED PROVIDER NOTES
[facility]      Pt Name: Shakir Franz  MRN: [id number]  [place] 1955  Date of evaluation: 9/20/2021  Provider: Mattie Sadler MD    CHIEF COMPLAINT     Chief Complaint   Patient presents with    Altered Mental Status     per report, pt was found with decreased responsiveness in bed. Spouse stated pt has not been able to \"get out of bed\" in several days. Decreased SpO2 noted on arrival, spouse said to be Covid positive         HISTORY OF PRESENT ILLNESS   (Location/Symptom, Timing/Onset, Context/Setting,Quality, Duration, Modifying Factors, Severity)  Note limiting factors. Shakir Franz is a75 y.o. female who presents to the emergency department      This is a 59years old female presenting due to some respiratory distress. Apparently her  called the ambulance because she has been on the bed for the last 2 3 days without getting out of bed. She is increasingly weaker and short of breath and feels found to be satting at 92% on room air. EMS started her on a nonrebreather because she seemed to be labored and in respiratory distress. She denies any pain but she does state that she does not feel well. She has history of CHF however she does not have a history of COPD. Nursing Notes werereviewed. REVIEW OF SYSTEMS    (2-9 systems for level 4, 10 or more for level 5)     Review of Systems   Constitutional: Positive for activity change, appetite change, chills and fatigue. Negative for diaphoresis, fever and unexpected weight change. HENT: Positive for congestion. Eyes: Negative. Respiratory: Positive for cough, chest tightness and shortness of breath. Negative for apnea, choking, wheezing and stridor. Cardiovascular: Negative. Gastrointestinal: Negative. Endocrine: Negative. Genitourinary: Negative. Musculoskeletal: Negative. Allergic/Immunologic: Negative.     Neurological:        She appears somewhat obtunded however not really sure what is her baseline. She does answer when asked what her name however she was not able to tell us what is her birthday. Hematological: Negative. Psychiatric/Behavioral:        Flat affect       Except as noted above the remainder of the review of systems was reviewed and negative. PAST MEDICAL HISTORY     Past Medical History:   Diagnosis Date    Cerebral aneurysm 0    CHF (congestive heart failure) ([facility])     Chronic renal insufficiency     Intracranial hemorrhage ([facility] 1997    Obesity     Respiratory failure, acute ([facility] 1997    Seizures ([facility]     no seizure since before 2002         SURGICALHISTORY       Past Surgical History:   Procedure Laterality Date    CRANIOTOMY      microdissection    GASTROSTOMY TUBE PLACEMENT      TRACHEOSTOMY           CURRENT MEDICATIONS       Discharge Medication List as of 9/24/2021  4:57 PM               Pcn [penicillins]    FAMILY HISTORY     No family history on file. SOCIAL HISTORY       Social History     Socioeconomic History    Marital status:      Spouse name: Not on file    Number of children: Not on file    Years of education: Not on file    Highest education level: Not on file   Occupational History    Not on file   Tobacco Use    Smoking status: Former Smoker     Packs/day: 1.00     Years: 15.00     Pack years: 15.00     Types: Cigarettes    Smokeless tobacco: Never Used    Tobacco comment: quit 30 yrs ago   Substance and Sexual Activity    Alcohol use: No    Drug use: Not on file    Sexual activity: Not on file   Other Topics Concern    Not on file   Social History Narrative    Not on file     Social Determinants of Health     Financial Resource Strain:     Difficulty of Paying Living Expenses:    Food Insecurity:     Worried About [address] in the Last Year:     [address] in the Last Year:    Transportation Needs:     Lack of Transportation (Medical):      Lack of Transportation (Non-Medical):    Physical Activity:     Days of Exercise per Week:     Minutes of Exercise per Session:    Stress:     Feeling of Stress :    Social Connections:     Frequency of Communication with Friends and Family:     Frequency of Social Gatherings with Friends and Family:     Attends [employer] Services:     Active Member of Clubs or Organizations:     Attends Club or Organization Meetings:     Marital Status:    Intimate Partner Violence:     Fear of Current or Ex-Partner:     Emotionally Abused:     Physically Abused:     Sexually Abused:        SCREENINGS      [place] Coma Scale  Eye Opening: None  Best Verbal Response: None  Best Motor Response: None  [place] Coma Scale Score: 3             PHYSICAL EXAM    (up to 7 for level 4, 8 or more for level 5)     ED Triage Vitals [09/20/21 1915]   BP Temp Temp src Pulse Resp SpO2 Height Weight   (!) 173/75 -- -- 85 (!) 32 95 % -- --       Physical Exam  Constitutional:       General: She is in acute distress. Appearance: She is ill-appearing. She is not toxic-appearing or diaphoretic. Comments: Patient is a chronic sickly appearing, morbidly obese frail. She is in the respiratory distress. HENT:      Head: Normocephalic and atraumatic. Eyes:      General: No visual field deficit. Extraocular Movements: Extraocular movements intact. Pupils: Pupils are equal, round, and reactive to light. Cardiovascular:      Rate and Rhythm: Normal rate and regular rhythm. Pulmonary:      Effort: Respiratory distress present. Breath sounds: No stridor. Wheezing, rhonchi and rales present. Chest:      Chest wall: No tenderness. Abdominal:      General: There is no distension. Palpations: There is no mass. Tenderness: There is no abdominal tenderness. There is no guarding. Musculoskeletal:         General: No swelling or tenderness. Normal range of motion. Skin:     Coloration: Skin is not cyanotic or pale.       Findings: No erythema or rash. Neurological:      Mental Status: She is alert. Mental status is at baseline. She is confused. GCS: GCS eye subscore is 4. GCS verbal subscore is 5. GCS motor subscore is 6. Cranial Nerves: No cranial nerve deficit, dysarthria or facial asymmetry. Psychiatric:         Mood and Affect: Mood normal.      Comments: Unknown what her baseline is however she appears to be flat affected. DIAGNOSTIC RESULTS     EKG: All EKG's are interpreted by the Emergency Department Physician who either signs orCo-signs this chart in the absence of a cardiologist.        RADIOLOGY:   plain film images such as CT, Ultrasound and MRI are read by the radiologist. Plain radiographic images are visualized and preliminarily interpreted by the emergency physician with the below findings:        Interpretation per the Radiologist below, ifavailable at the time of this note:    XR CHEST (SINGLE VIEW FRONTAL)   Final Result   Dense multifocal pulmonary opacities with relative sparing of the lung   apices. Findings may be due to edema or multifocal infection in the   appropriate clinical setting. Small right pleural effusion.                ED BEDSIDE ULTRASOUND:   Performed by ED Physician - none    LABS:  Labs Reviewed   COVID-19, RAPID - Abnormal; Notable for the following components:       Result Value    SARS-CoV-2, Rapid DETECTED (*)     All other components within normal limits   CULTURE, BLOOD 1 - Abnormal; Notable for the following components:    Culture   (*)     Value: POSITIVE BLOOD CULTURE, RN NOTIFIED: [facility] Patterson [facility] @ 1811  RB    Culture   (*)     Value: Staphylococcus epidermidis Detected: mecA/C Gene Detected- Methicillin Resistant Organism Methodology- Polymerase Chain Reaction (PCR)    Culture   (*)     Value: STAPHYLOCOCCUS EPIDERMIDIS A single positive blood culture of coagulase negative Staphylocci, diphtheroids,micrococci, Cutibacterium, viridans Streptocci, Bacillus, or Lactobacillus species should be interpreted with caution and viewed as a likely skin contaminant.     All other components within normal limits   CBC WITH AUTO DIFFERENTIAL - Abnormal; Notable for the following components:    WBC 13.8 (*)     RBC 3.21 (*)     Hemoglobin 9.9 (*)     Hematocrit 31.1 (*)     Platelets 636 (*)     Seg Neutrophils 95 (*)     Lymphocytes 3 (*)     Monocytes 2 (*)     Eosinophils % 0 (*)     Segs Absolute 13.11 (*)     Absolute Lymph # 0.41 (*)     All other components within normal limits   COMPREHENSIVE METABOLIC PANEL - Abnormal; Notable for the following components:     (*)     CREATININE 13.57 (*)     CO2 7 (*)     Anion Gap 28 (*)     Total Bilirubin 0.26 (*)     GFR Non- 3 (*)     GFR  3 (*)     All other components within normal limits   PROTIME-INR - Abnormal; Notable for the following components:    Protime 14.8 (*)     All other components within normal limits   BLOOD GAS, ARTERIAL - Abnormal; Notable for the following components:    pH, Arterial 7.144 (*)     pCO2, Arterial 20.4 (*)     pO2, Arterial 111.2 (*)     HCO3, Arterial 6.9 (*)     Negative Base Excess, Art 20.1 (*)     All other components within normal limits   TROPONIN - Abnormal; Notable for the following components:    Troponin, High Sensitivity 49 (*)     All other components within normal limits   BRAIN NATRIURETIC PEPTIDE - Abnormal; Notable for the following components:    Pro-BNP 1,307 (*)     All other components within normal limits   BLOOD GAS, VENOUS - Abnormal; Notable for the following components:    pH, Bernabe 7.122 (*)     pCO2, Bernabe 30.0 (*)     HCO3, Venous 9.6 (*)     Negative Base Excess, Bernabe 18.4 (*)     O2 Sat, Bernabe 51.1 (*)     All other components within normal limits   MYOGLOBIN, SERUM - Abnormal; Notable for the following components:    Myoglobin 309 (*)     All other components within normal limits   BASIC METABOLIC PANEL - Abnormal; Notable for the following components:    Glucose 104 (*)      (*)     CREATININE 13.61 (*)     Chloride 108 (*)     CO2 8 (*)     Anion Gap 28 (*)     GFR Non- 3 (*)     GFR  3 (*)     All other components within normal limits   LACTIC ACID, PLASMA   AMMONIA   CK       All other labs were within normal range ornot returned as of this dictation. EMERGENCY DEPARTMENT COURSE and DIFFERENTIAL DIAGNOSIS/MDM:   Vitals:    Vitals:    09/23/21 1900 09/24/21 0030 09/24/21 0639 09/24/21 1314   BP: (!) 153/68 (!) 147/60 113/63 (!) 55/32   Pulse: 76 77 96 80   Resp: 18 20 24 25   Temp: 98 °F (36.7 °C) 97.5 °F (36.4 °C) 97.3 °F (36.3 °C) 97.2 °F (36.2 °C)   TempSrc: Temporal Temporal Temporal Temporal   SpO2: (!) 88% (!) 85% (!) 86% (!) 86%   Weight:       Height:               MDM  Number of Diagnoses or Management Options  Acute renal failure, unspecified acute renal failure type (HCC)  COVID-19  Pneumonia due to COVID-19 virus  Uremic encephalopathy  Diagnosis management comments: Patient critically ill was brought to the ER after her  alert to the AMS due to decreased responsiveness. She was sent by EMS with low oxygenation and low alertness. She was diagnosed with COVID-19 pneumonia and acute renal failure. Should be admitted to the hospital for further evaluation and treatment however she appears to have a poor prognosis at this time. She also evidently has encephalopathy due to above diagnoses as well. Patient did not want to be intubated this was communicated to us by the family as well. Also patient refused dialysis. Initially we try to transfer patient to higher level care where she would receive dialysis however later on where he learned that the patient did not want to have any dialysis which was confirmed by the patient and the family as well. CRITICAL CARE TIME   Total CriticalCare time was  minutes, excluding separately reportable procedures.   There was

## 2021-09-21 PROBLEM — U07.1 COVID-19 VIRUS INFECTION: Status: ACTIVE | Noted: 2021-01-01

## 2021-09-21 PROBLEM — J96.01 ACUTE RESPIRATORY FAILURE WITH HYPOXIA (HCC): Status: ACTIVE | Noted: 2021-01-01

## 2021-09-21 PROBLEM — N19 UREMIA OF RENAL ORIGIN: Status: ACTIVE | Noted: 2021-01-01

## 2021-09-21 PROBLEM — N18.6 END STAGE RENAL DISEASE (HCC): Status: ACTIVE | Noted: 2021-01-01

## 2021-09-21 PROBLEM — Z51.5 ADMISSION FOR PALLIATIVE CARE: Status: ACTIVE | Noted: 2021-01-01

## 2021-09-21 NOTE — ED NOTES
[facility] [employer] cannot take pt, trying [facility] [employer].      Rosie Goes  09/20/21 6084

## 2021-09-21 NOTE — H&P
Provider, MD   aspirin 81 MG EC tablet Take 81 mg by mouth daily. Historical Provider, MD       Review of Systems: Patient unable due to unresponsiveness and altered mental status      Physical Exam:    Vitals:   Temp: 96.8 °F (36 °C)  BP: (!) 148/56  Resp: 22  Pulse: 65  SpO2: 91 %  24HR INTAKE/OUTPUT:      Intake/Output Summary (Last 24 hours) at 9/21/2021 1851  Last data filed at 9/21/2021 0109  Gross per 24 hour   Intake --   Output 525 ml   Net -525 ml       Exam:  GEN:  frail-appearing, obese, pale and somnolent  EYES: No gross abnormalities.  and PERRL  NECK: normal, supple, no lymphadenopathy,  no carotid bruits  PULM: rales present-bilateral bases  COR: regular rate & rhythm, no murmurs, no gallops, S1 normal and S2 normal  ABD:  soft, non-tender, non-distended, normal bowel sounds, no masses or organomegaly  EXT:   no cyanosis, clubbing or edema present    NEURO: Does not follow commands, moans  SKIN:  no rashes or significant lesions  -----------------------------------------------------------------  Diagnostic Data:   Lab Results   Component Value Date    WBC 13.8 (H) 09/20/2021    HGB 9.9 (L) 09/20/2021     (L) 09/20/2021       Lab Results   Component Value Date     (HH) 09/21/2021    CREATININE 13.61 (HH) 09/21/2021     09/21/2021    K 4.6 09/21/2021    CALCIUM 10.0 09/21/2021     (H) 09/21/2021    CO2 8 (LL) 09/21/2021    LABGLOM 3 (L) 09/21/2021       Lab Results   Component Value Date    WBCUA 10 TO 20 06/29/2019    RBCUA 0 TO 2 06/29/2019    EPITHUA 0 TO 2 06/29/2019    LEUKOCYTESUR SMALL (A) 06/29/2019    SPECGRAV 1.010 06/29/2019    GLUCOSEU NEGATIVE 06/29/2019    KETUA NEGATIVE 06/29/2019    PROTEINU 1+ (A) 06/29/2019    HGBUR TRACE (A) 06/29/2019    CASTUA NOT REPORTED 06/29/2019    CRYSTUA 0 TO 2 CALCIUM OXALATE (A) 06/29/2019    BACTERIA 1+ (A) 06/29/2019    YEAST NOT REPORTED 06/29/2019       Lab Results   Component Value Date    MYOGLOBIN 309 (H) 09/21/2021 TROPONINT NOT REPORTED 09/20/2021    CKTOTAL 51 09/21/2021    CKMB 1.2 06/28/2019    PROBNP 1,307 (H) 09/20/2021       XR CHEST (SINGLE VIEW FRONTAL)    Result Date: 9/20/2021  EXAMINATION: ONE XRAY VIEW OF THE CHEST 9/20/2021 4:31 pm COMPARISON: 07/14/2020, 04/01/2019 HISTORY: ORDERING SYSTEM PROVIDED HISTORY: resp distress TECHNOLOGIST PROVIDED HISTORY: resp distress FINDINGS: Dense multifocal pulmonary opacities bilaterally with relative sparing of the lung apices. Small right effusion. No pneumothorax. Cardiomegaly with a tortuous aorta, similar to prior. No acute bony findings. Dense multifocal pulmonary opacities with relative sparing of the lung apices. Findings may be due to edema or multifocal infection in the appropriate clinical setting. Small right pleural effusion. XR CHEST (SINGLE VIEW FRONTAL)   Final Result   Dense multifocal pulmonary opacities with relative sparing of the lung   apices. Findings may be due to edema or multifocal infection in the   appropriate clinical setting. Small right pleural effusion. Assessment:    Principal Problem:    Uremia of renal origin /  patient discontinued dialysis  Active Problems:    Acute respiratory failure with hypoxia ([address]    COVID-19 virus infection    Admission for palliative care    End stage renal disease ([facility] [address]  Resolved Problems:    * No resolved hospital problems.  *      Patient Active Problem List    Diagnosis Date Noted    Acute respiratory failure with hypoxia ([address] 09/21/2021    COVID-19 virus infection 09/21/2021    Admission for palliative care 09/21/2021    End stage renal disease ([address] 09/21/2021    Uremia of renal origin /  patient discontinued dialysis 09/21/2021    Anemia 04/08/2019    Anemia due to blood loss 04/04/2019    Hyperkalemia 04/03/2019    Acute renal failure superimposed on chronic kidney disease ([address] 04/03/2019    Vaginal mass 04/03/2019    Abdominal pain     Endometrial disorder     Pelvic mass     Chronic diastolic CHF (congestive heart failure), NYHA class 3 ([facility]) 04/02/2019    Toxic encephalopathy 04/02/2019    Urinary retention 04/01/2019    Hypoxia 04/01/2019    Obesity     Seizures ([facility])     Chronic renal insufficiency     Pelvic pain 03/31/2019    CKD (chronic kidney disease) stage 3, GFR 30-59 ml/min ([facility]) 10/28/2015    Essential hypertension 10/28/2015    Hemorrhagic cerebrovascular accident (CVA) ([address] 10/28/2015    Cholelithiasis 04/12/2012    Cerebral aneurysm 01/01/1997       Plan:     · This patient requires inpatient admission because of uremia of renal origin  · Factors affecting the medical complexity of this patient include palliative care, end-stage renal disease, COVID-19 virus infection acute respiratory failure with hypoxia  · Estimated length of stay is 2 days  · COVID-19 virus infection  · Droplet precautions  · Acute respiratory failure with hypoxia  · Supplemental oxygen for comfort  · As needed morphine  · Uremia of renal origin  · Clements catheter  · As needed morphine for pain  · As needed Ativan for agitation  · As needed atropine drops for increased secretions  · As needed Zofran for nausea  · As needed Tylenol for fever  · DVT prophylaxis: Palliative care  · Peptic ulcer prophylaxis: Pepcid  · High risk medications: Morphine, Ativan  · Social Service and Case Management consults for DC planning  · Dietician consult initiated    CORE MEASURES  DVT prophylaxis: Palliative care  Decubitus ulcer present on admission: No  CODE STATUS: DNR-CC  Nutrition Status: poor  Physical therapy: No   Old Charts reviewed: Yes  EKG Reviewed: Yes  Advance Directive Addressed:  \"Yes - in chart    MIKO Jones - CNP, MIKO, NP-C  9/21/2021, 6:51 PM

## 2021-09-21 NOTE — ED NOTES
Pt daughter would like to be re-updated later on tonight if patient is admitted or transferred. Daughter already given update as of right now for patients current condition.      Reginaldo Casas RN  09/20/21 1217

## 2021-09-21 NOTE — ED NOTES
Daughter Prabhu Hutchinson called asking for update on pt. Daughter stated that pt was a previous dialysis patient from June 2020 - November 2020. Daughter stated that at that time, pt voiced wishes of not wanting to continue with dialysis treatments. Dialysis treatments at that time were stopped. Daughter expressed concerns over current transfer for possible emergent dialysis due to pt's previous wishes. Daughter stated pt does not have a current POA. Pt remains asleep at bedside unable to express wishes with current treatment plan. Daughter Prabhu Hutchinson stated that she would speak with her sister Nirav Nguyen figure something out. \". Dr. Liz Bruner updated on conversation.       Wenceslao Rachel, HEIDI  09/21/21 [id number], RN  09/21/21 4264

## 2021-09-21 NOTE — ED PROVIDER NOTES
Care of Janae Arriaza was assumed from previous attending and is being seen for Altered Mental Status (per report, pt was found with decreased responsiveness in bed. Spouse stated pt has not been able to \"get out of bed\" in several days. Decreased SpO2 noted on arrival, spouse said to be Covid positive)  . The patient's initial evaluation and plan have been discussed with the prior provider who initially evaluated the patient. Handoff taken on the following patient from prior Attending Physician:    Janis Soto    I was available and discussed any additional care issues that arose and coordinated the management plans with the resident(s) caring for the patient during my duty period. Any areas of disagreement with residents documentation of care or procedures are noted on the chart. I was personally present for the key portions of any/all procedures during my duty period. I have documented in the chart those procedures where I was not present during the key portions.       EMERGENCY DEPARTMENT COURSE / MEDICAL DECISION MAKING:       MEDICATIONS GIVEN:  Orders Placed This Encounter   Medications    dexamethasone (DECADRON) injection 10 mg    DISCONTD: 0.9 % sodium chloride bolus    sodium bicarbonate 8.4 % injection 50 mEq    0.9 % sodium chloride bolus    sodium bicarbonate 150 mEq in dextrose 5 % 1,000 mL infusion    sodium bicarbonate 8.4 % injection     Cecilio Tabaresin: cabinet override       LABS / RADIOLOGY:     Labs Reviewed   COVID-19, RAPID - Abnormal; Notable for the following components:       Result Value    SARS-CoV-2, Rapid DETECTED (*)     All other components within normal limits   CBC WITH AUTO DIFFERENTIAL - Abnormal; Notable for the following components:    WBC 13.8 (*)     RBC 3.21 (*)     Hemoglobin 9.9 (*)     Hematocrit 31.1 (*)     Platelets 303 (*)     Seg Neutrophils 95 (*)     Lymphocytes 3 (*)     Monocytes 2 (*)     Eosinophils % 0 (*)     Segs Absolute 13.11 (*)     Absolute Lymph # 0.41 (*)     All other components within normal limits   COMPREHENSIVE METABOLIC PANEL - Abnormal; Notable for the following components:     (*)     CREATININE 13.57 (*)     CO2 7 (*)     Anion Gap 28 (*)     Total Bilirubin 0.26 (*)     GFR Non- 3 (*)     GFR  3 (*)     All other components within normal limits   PROTIME-INR - Abnormal; Notable for the following components:    Protime 14.8 (*)     All other components within normal limits   BLOOD GAS, ARTERIAL - Abnormal; Notable for the following components:    pH, Arterial 7.144 (*)     pCO2, Arterial 20.4 (*)     pO2, Arterial 111.2 (*)     HCO3, Arterial 6.9 (*)     Negative Base Excess, Art 20.1 (*)     All other components within normal limits   TROPONIN - Abnormal; Notable for the following components:    Troponin, High Sensitivity 49 (*)     All other components within normal limits   BRAIN NATRIURETIC PEPTIDE - Abnormal; Notable for the following components:    Pro-BNP 1,307 (*)     All other components within normal limits   BLOOD GAS, VENOUS - Abnormal; Notable for the following components:    pH, Bernabe 7.122 (*)     pCO2, Bernabe 30.0 (*)     HCO3, Venous 9.6 (*)     Negative Base Excess, Bernabe 18.4 (*)     O2 Sat, Bernabe 51.1 (*)     All other components within normal limits   MYOGLOBIN, SERUM - Abnormal; Notable for the following components:    Myoglobin 309 (*)     All other components within normal limits   BASIC METABOLIC PANEL - Abnormal; Notable for the following components:    Glucose 104 (*)      (*)     CREATININE 13.61 (*)     Chloride 108 (*)     CO2 8 (*)     Anion Gap 28 (*)     GFR Non- 3 (*)     GFR  3 (*)     All other components within normal limits   CULTURE, BLOOD 1   LACTIC ACID, PLASMA   AMMONIA   CK       XR CHEST (SINGLE VIEW FRONTAL)    Result Date: 9/20/2021  EXAMINATION: ONE XRAY VIEW OF THE CHEST 9/20/2021 4:31 pm COMPARISON: 07/14/2020, 04/01/2019 HISTORY: ORDERING SYSTEM PROVIDED HISTORY: resp distress TECHNOLOGIST PROVIDED HISTORY: resp distress FINDINGS: Dense multifocal pulmonary opacities bilaterally with relative sparing of the lung apices. Small right effusion. No pneumothorax. Cardiomegaly with a tortuous aorta, similar to prior. No acute bony findings. Dense multifocal pulmonary opacities with relative sparing of the lung apices. Findings may be due to edema or multifocal infection in the appropriate clinical setting. Small right pleural effusion. RECENT VITALS:     Temp: 98.9 °F (37.2 °C),  Pulse: 60, Resp: 26, BP: 134/61, SpO2: 95 %    This patient is a 74 y.o. Female with altered mentation, and decreased responsiveness. Was brought in showing pneumonia on x-ray consistent with Covid and Covid test positive. Also showing renal failure. Patient is a previous dialysis patient but did not want to continue it about a year ago. And stopped dialysis completely and had voiced opinion that she did not want to have dialysis again. She even voiced to ER nurse as well as ER physician last night that she would not agree to dialysis. Patient on my evaluation is worsened mentation, she does open her eyes and moves extremities spontaneously but is not able to verbalize or comment. I did call and speak with her daughter Chris Regalado who is at bedside at this time regarding patient's care. Patient initially had been accepted at [facility] but was called by staff stating that they did not have space for her at this time. Given that patient is refusing dialysis. Transferring her to another facility does not seem necessary. She did have worsening respiratory status and was placed on BiPAP and appears more comfortable at this time. I called and spoke with patient's spouse Cezar Cabello on the phone and updated him on her current state being very guarded as she is very sick and I expressed concern that she would die.   Patient wants me to initiate dialysis. I spoke with him stating that patient very clearly had told previous physician that she did not want this and has communicated that previously with multiple family members and that was her wishes. Patient's CODE STATUS is otherwise full code including CPR as well as intubation. Will speak with nephrology again regarding additional treatment. She is on a bicarb drip at this time. She got steroids last night due to her pneumonia. I have expressed concern that patient is very sick and there is a good chance that she may die. And this was communicated to patient's  as well as her daughter who is present at the bedside. I did speak with nephrologist Dr. Violet Mao at [facility]"" who also confirmed without dialysis nothing else to do, can continue with bicarb drip. I updated the daughter at bedside who has also spoken with spouse Jane Magana). Daughter is wanting more home with hospice care, but spouse still wanting full code. I spoke with Dr. Bisi Schmitt who accepted this patient. OUTSTANDING TASKS / RECOMMENDATIONS:      1. Uremic encephalopathy    2. Acute renal failure, unspecified acute renal failure type ([address]    3. COVID-19    4.  Pneumonia due to COVID-19 virus          Chriss Knox, DO, DO  Attending Emergency Physician  [facility]        Aliza Esteban DO  09/21/21 4550

## 2021-09-21 NOTE — ED NOTES
[facility] [facility] for transfer, [facility] and [facility] are not full, trying [facility], waiting for call back.       [facility]  09/20/21 1129

## 2021-09-21 NOTE — PROGRESS NOTES
Morphine given per family request d/t patient looking uncomfortable in bed and pulling legs into chest. Patient did not have any s/s of pain when Morphine given. Will continue to monitor for intervention needs. Family updated that CNP will be in shortly to discuss plan of care with family.

## 2021-09-21 NOTE — ED NOTES
[facility] has contacted [facility], [facility], [facility] Corina Calvert [place], [place], [place], [address] [facility] does not have a bed for the pt tonight, trying [facility] for acceptance.       Samm Acosta  09/21/21 0022

## 2021-09-21 NOTE — ED NOTES
Waiting on [address] to call back with decision on if they will accept pt or not.       Magui Kerns  09/20/21 3642

## 2021-09-21 NOTE — PROGRESS NOTES
SPO2 89-91% on 10L high flow, but no respiratory distress noted. Patient unresponsive to verbal communication, but does moan at times when moved. Patient made comfortable in bed at this time.

## 2021-09-21 NOTE — ED NOTES
Pt placed on 4L NC, SpO2 decreased to 87%, [facility]  09/20/21 4629

## 2021-09-21 NOTE — ED NOTES
Pt remains confused and lethargic in room. Pt removed O2, O2 reapplied, verbal redirection given.      Amadou Sawant RN  09/21/21 4384

## 2021-09-22 NOTE — PROGRESS NOTES
The patient is moaning and feels like she is in pain but is not opening her mouth for oral morphine, so the write called Dr. Ginette Florez and obtained the order for IV morphine.

## 2021-09-22 NOTE — PROGRESS NOTES
Nancy Sultana M.D. Internal Medicine Progress Note     SUBJECTIVE:    Patient seen for f/u of Uremia of renal origin. She is somnolent and difficult to arouse this AM.   Resting comfortably, does not appear to be in any distress    ROS:   Unobtainable due to decreased responsiveness    OBJECTIVE:  Vitals:   Temp: 98.5 °F (36.9 °C)  BP: (!) 119/58  Resp: 22  Pulse: 88  SpO2: 92 % on supplemental O2    24HR INTAKE/OUTPUT:      Intake/Output Summary (Last 24 hours) at 9/22/2021 1021  Last data filed at 9/22/2021 0159  Gross per 24 hour   Intake --   Output 900 ml   Net -900 ml     -----------------------------------------------------------------  Exam:  GEN:    somnolent - does not arouse to verbal / tactile stimuli. I did not attempt noxious stimuli  CVS:    regular rate and rhythm, no audible murmur  PULM:  diminished with bilateral scattered rhonchi, no acute respiratory distress  ABD:    Bowels sounds normal.  Abdomen is soft. No distention. EXT:   no edema bilaterally . No calf tenderness. NEURO: Moves all extremities. Motor and sensory are grossly intact  SKIN:  No rashes. No skin lesions.    -----------------------------------------------------------------  Diagnostic Data:    · All available data reviewed  Lab Results   Component Value Date    WBC 13.8 (H) 09/20/2021    HGB 9.9 (L) 09/20/2021    MCV 96.9 09/20/2021     (L) 09/20/2021      Lab Results   Component Value Date    GLUCOSE 104 (H) 09/21/2021     (HH) 09/21/2021    CREATININE 13.61 (HH) 09/21/2021     09/21/2021    K 4.6 09/21/2021    CALCIUM 10.0 09/21/2021     (H) 09/21/2021    CO2 8 (LL) 09/21/2021       XR CHEST (SINGLE VIEW FRONTAL)   Final Result   Dense multifocal pulmonary opacities with relative sparing of the lung   apices. Findings may be due to edema or multifocal infection in the   appropriate clinical setting. Small right pleural effusion.              ASSESSMENT / PLAN:  End stage renal failure with uremia of renal origin  · Admitted for palliative care  · Pt declines any further dialysis / treatement  · Recommend Hospice consult  · Covid-19 viral pneumonia with acute hypoxemic respiratory failure  · Palliative care  · Supplemental O2 for comfort  · High risk medications: none   · Disposition:   consult for Connie Florence MD , M.D.  9/22/2021  10:21 AM

## 2021-09-22 NOTE — PROGRESS NOTES
The patient's family requested ativan for patient's anxiety but patient's refused to open her mouth even after explaining what the medication is for. The ativan that was pulled out for her has been wasted at the med room container with another nurse, Floretta Hammans.

## 2021-09-22 NOTE — CONSULTS
Palliative Care Notes    Reason for Consult:  hospice discussion    Patient Active Problem List   Diagnosis    Cholelithiasis    CKD (chronic kidney disease) stage 3, GFR 30-59 ml/min ([facility])    Essential hypertension    [facility]vascular [facility] ([facility]A) [facility]    Pelvic pain    Obesity    Seizures [facility]    Chronic renal insufficiency    Cerebral aneurysm    Urinary retention    Hypoxia    Chronic diastolic CHF (congestive heart failure), NYHA class 3 ([facility])    Toxic encephalopathy    Hyperkalemia    Acute renal failure superimposed on chronic kidney disease (HCC)    Vaginal mass    Abdominal pain    Endometrial disorder    Pelvic mass    Anemia due to blood loss    Anemia    Acute respiratory failure with hypoxia ([facility]    COVID-19 virus infection    Admission for palliative care    End stage renal disease ([facility]    Uremia of renal origin /  patient discontinued dialysis       Advance Directives:  Code status: DNR-CC  Patient has capacity for medical decisions: no  Health Care Power of : no  Living Will: no        Pain Management:  Unresponsive at this time. She has oral and IV pain medication ordered as well as IV anxiety medication    Symptom Management:  Are there any other symptoms that are distressing to the patient or family that needs addressed? Anxiety:  HAYES                          Dyspnea:  acute dyspnea                          Fatigue:  HAYES                          Bladder function:  Clements catheter in place                          Bowel function:  bowel movement accidents/incontinence    Other:  Denies issues     Spiritual history/needs:   notified: n/a    Palliative Performance Scale:  ___70%  Ambulation reduced;  Some disease; Can't do normal job or work; intake normal or reduced; can do full self care; LOC full  ___60%  Ambulation reduced; Significant disease; Can't do hobbies/housework; intake normal or reduced; occasional assist; LOC full/confusion  ___50%  Mainly sit/lie; Extensive disease; Can't do any work; Considerable assist; intake normal or reduced; LOC full/confusion  ___40%  Mainly in bed; Extensive disease; Mainly assist; intake normal or reduced; LOC full/confusion   ___30%  Bed Bound; Extensive disease; Total care; intake reduced; LOCfull/confusion  ___20%  Bed Bound; Extensive disease; Total care; intake minimal; Drowsy/coma  __x_10%  Bed Bound; Extensive disease; Total care; Mouth care only; Drowsy/coma  ___0       Death    Readmission Risk:  15%    Notes:   Larry Contreras is currently resting in bed. She is somnolent. She presented to the emergency department after not being able to get out of bed for the last 2-3 days. She has a history of  CHF and CKD. Attempted to speak to her spouse via phone. Message left. Spoke with Chasidy's daughter Kamille Regalado via phone. Kamille Fabricio states that prior to this illness Larry Contreras was the caregiver to her . She has CKD and refused to go on dialysis last year. Larry Contreras has three children and Kamille Parrishke states Prashant Lee is going to die. We all know it. \" Support provided. Per daughter, Larry Contreras never completed a living will or [facility]. Her spouse is her decision maker, \"and he cant come to terms that she is not going home, home to be with Abilio but not home with him. He thinks that because she is at the hospital she will get better. \" Discussed hospice. Kamille Regalado states that the children would love to take her back home with hospice so that she can spend her limited time with her family and pets; \"but Ilia Jennyfer is legally her decision maker and he cant come to terms with the fact that she is dying. \" Support provided. Kamille Parrishke shares that all of the children have discussed this with Ilia Burger as well as the ER physician and his nurse at the dialysis center yesterday. Mattie plans to discuss this further this evening when she gets off of work. States that she is not hopeful that she will be able to get through to Ilia Burger.  We discuss Chasidy's current condition, pain and anxiety medications. \"I just want her to be kept comfortable. \" Will continue to follow and support.     Palliative Care Plan:  Education/support to family  Discharge planning/helping to coordinate care  Pharmacologic pain management  Managing depression/anxiety  Managing anticipatory grief  Caregiver support/education  Continue with current plan of care  Validating patient/family distress  Recognizing, reflecting, and empathizing with family members' anticipatory grief  Palliative Care Goals:  provide comfort care/support/palliation/relieve suffering, strengthening relationships and support for family/caregiver  Visit focus:  Routine meeting  Functional decline  Discuss goals of care  Provide clinical updates and answer questions  Listen to patient/family concerns  Assess family understanding, concerns, and coping  Discuss chronic critical illness  Discuss discharge planning  Interdiscplinary collaboration  Address patient/family distress  Build trust  Poor prognosis is anticipated  Provide emotional support to the family  Elicit patient's goals and values (through substituted judgment of a surrogate decision maker), and use these to establish or modify goals of care    [address] Nurse Coordinator  9/22/2021 12:47 PM

## 2021-09-22 NOTE — PROGRESS NOTES
Attempts made to contact Patient's  made this p.m. without success. Will continue to try and reach to discuss goals of care and options available.     [address] [facility]  9/22/2021

## 2021-09-22 NOTE — PROGRESS NOTES
The patient is non-verbal except for some response to voice or moans during discomfort/pain. Family is at the bedside. Vitals are checked and are within the normal except her B/P being elevated. Patient's O2 saturation is 91% on 10 L/min O2. No discomfort or pain noted while looking at the patient and FLACC scale was used for pain assessment. Further needs are assessed. Safety precautions are on place. Will continue to monitor.

## 2021-09-22 NOTE — PROGRESS NOTES
MEDICAL NUTRITION THERAPY- SCREENING   Oscar Lerma is a 74 y.o.  female     Admission Date: 9/20/2021    Principal Problem:  Uremia of renal origin    Patient nutritionally screened per palliative care plan. Current intakes on NO DIET diet are not recorded. Food and fluid as aids comfort is recommended. Body mass index is 42.41 kg/m². indicating Morbid Obesity. Clinical nutrition services will continue to be available as indicated or desired by patient or family. Follow-up and re-evaluate as needed.     Lab Results   Component Value Date     09/21/2021    K 4.6 09/21/2021     (H) 09/21/2021    CO2 8 (LL) 09/21/2021     (HH) 09/21/2021    CREATININE 13.61 (HH) 09/21/2021    GLUCOSE 104 (H) 09/21/2021    CALCIUM 10.0 09/21/2021    PROT 7.3 09/20/2021    LABALBU 3.7 09/20/2021    BILITOT 0.26 (L) 09/20/2021    ALKPHOS 83 09/20/2021    AST 14 09/20/2021    ALT 14 09/20/2021    LABGLOM 3 (L) 09/21/2021    GFRAA 3 (L) 09/21/2021     Lab Results   Component Value Date    LABA1C 5.7 01/19/2017     Lab Results   Component Value Date     01/19/2017     Lab Results   Component Value Date    VITD25 12.5 (L) 06/28/2019     Electronically signed by Dany Ward RD, LD on 9/22/2021 at 7:54 AM

## 2021-09-23 NOTE — PROGRESS NOTES
Daniel Freeman M.D. Internal Medicine Progress Note     SUBJECTIVE:    Patient seen for f/u of Uremia of renal origin. She is unresponsive this morning. Resting comfortably, does not appear to be in any distress. ROS:   Unobtainable due to decreased responsiveness    OBJECTIVE:  Vitals:   Temp: 98.8 °F (37.1 °C)  BP: (!) 154/69  Resp: 20  Pulse: 76  SpO2: (!) 89 % on supplemental O2    24HR INTAKE/OUTPUT:      Intake/Output Summary (Last 24 hours) at 9/23/2021 1110  Last data filed at 9/22/2021 2344  Gross per 24 hour   Intake --   Output 325 ml   Net -325 ml     -----------------------------------------------------------------  Exam:  GEN:    somnolent - does not arouse to verbal / tactile stimuli. CVS:    regular rate and rhythm, no audible murmur  PULM:  diminished with bilateral scattered rhonchi, no acute respiratory distress  ABD:    Bowels sounds normal.  Abdomen is soft. No distention. EXT:   no edema bilaterally . No calf tenderness. NEURO: Moves all extremities. Motor and sensory are grossly intact  SKIN:  No rashes. No skin lesions.    -----------------------------------------------------------------  Diagnostic Data:    · All available data reviewed  Lab Results   Component Value Date    WBC 13.8 (H) 09/20/2021    HGB 9.9 (L) 09/20/2021    MCV 96.9 09/20/2021     (L) 09/20/2021      Lab Results   Component Value Date    GLUCOSE 104 (H) 09/21/2021     (HH) 09/21/2021    CREATININE 13.61 (HH) 09/21/2021     09/21/2021    K 4.6 09/21/2021    CALCIUM 10.0 09/21/2021     (H) 09/21/2021    CO2 8 (LL) 09/21/2021       XR CHEST (SINGLE VIEW FRONTAL)   Final Result   Dense multifocal pulmonary opacities with relative sparing of the lung   apices. Findings may be due to edema or multifocal infection in the   appropriate clinical setting. Small right pleural effusion.              ASSESSMENT / PLAN:  End stage renal failure with uremia of renal origin  · Admitted for palliative care  · Pt declined any further dialysis / treatement  · Recommend Hospice consult - family conflicted   · Per SS / palliative care notes: pt's children are all in favor of bringing pt home with Hospice care so she can spend the rest of her limited lifetime with her family and pets, however Pt's  is having difficulty coming to terms with her terminal condition and so far has refused Hospice  · SS to f/u  · Family meeting was scheduled for last evening - unsure of results at this point in time  · Covid-19 viral pneumonia with acute hypoxemic respiratory failure  · Palliative care  · Supplemental O2 for comfort  · High risk medications: none   · Disposition:  SS consult for DC planning  · Prognosis: terminal     Kath Padgett MD , M.D.  9/23/2021  11:10 AM

## 2021-09-23 NOTE — PROGRESS NOTES
Patient continues to be unresponsive with her eyes closed. Vitals are within the normal. Family is at the bedside. Patient is repositioned every 2-3 hours with pillow support. Clements is still intact. Further needs are assessed. Safety precautions are in place. Will continue to monitor.

## 2021-09-23 NOTE — PROGRESS NOTES
Repositioned patient at this time. Small amount of saliva noted to patient's mouth. Writer suctioned the patient's mouth. Patient tolerated well.

## 2021-09-23 NOTE — PROGRESS NOTES
Patient is given IV morphine at this time for her pain and discomfort because the writer noticed her moaning while rounding on her. Oral care is also provided with some mouth swabs.

## 2021-09-23 NOTE — PROGRESS NOTES
Pt continues to rest with eyes closed. No signs of pain or distress at this time. Will continue to monitor.

## 2021-09-23 NOTE — PROGRESS NOTES
Repositioned patient at this time. Small amount of emesis noted to pillowcase. Mouth care performed and also suctioned at this time. Small amount of thick secretions removed. Patient does respond when turning her in bed and grimaces. Atropine given at this time for oral secretions as well as morphine for comfort.

## 2021-09-24 NOTE — PROGRESS NOTES
Li Chakraborty M.D. Internal Medicine Progress Note     SUBJECTIVE:    Patient seen for f/u of Uremia of renal origin. She is unresponsive still. Resting comfortably, does not appear to be in any distress. ROS:   Unobtainable due to decreased responsiveness    OBJECTIVE:  Vitals:   Temp: 97.2 °F (36.2 °C)  BP: (!) 55/32  Resp: 25  Pulse: 80  SpO2: (!) 86 % on supplemental O2    24HR INTAKE/OUTPUT:      Intake/Output Summary (Last 24 hours) at 9/24/2021 1358  Last data filed at 9/24/2021 0900  Gross per 24 hour   Intake 0 ml   Output 100 ml   Net -100 ml     -----------------------------------------------------------------  Exam:  GEN:    somnolent - does not arouse to verbal / tactile stimuli. CVS:    regular rate and rhythm, no audible murmur  PULM:  diminished with bilateral scattered rhonchi, no acute respiratory distress  ABD:    Bowels sounds normal.  Abdomen is soft. No distention. EXT:   no edema bilaterally . No calf tenderness. SKIN:  Mottling on LE's  -----------------------------------------------------------------  Diagnostic Data:    · All available data reviewed  Lab Results   Component Value Date    WBC 13.8 (H) 09/20/2021    HGB 9.9 (L) 09/20/2021    MCV 96.9 09/20/2021     (L) 09/20/2021      Lab Results   Component Value Date    GLUCOSE 104 (H) 09/21/2021     (HH) 09/21/2021    CREATININE 13.61 (HH) 09/21/2021     09/21/2021    K 4.6 09/21/2021    CALCIUM 10.0 09/21/2021     (H) 09/21/2021    CO2 8 (LL) 09/21/2021       XR CHEST (SINGLE VIEW FRONTAL)   Final Result   Dense multifocal pulmonary opacities with relative sparing of the lung   apices. Findings may be due to edema or multifocal infection in the   appropriate clinical setting. Small right pleural effusion.              ASSESSMENT / PLAN:  End stage renal failure with uremia of renal origin  · Admitted for palliative care  · Pt declined any further dialysis / treatement  · Recommend

## 2021-09-24 NOTE — PROGRESS NOTES
Patients time of death . Verified by Cody Castillo RN and writer, no BP, pulse, or oxygen saturation able to be obtained. No heart sounds noted. Supervisor and Cl Santos notified.

## 2021-09-24 NOTE — DISCHARGE SUMMARY
Conner Huff, APRN-CNP  Hospitalist Discharge Summary / Death Note    Patient ID:  Shakir Franz  [id number]  1955    Admission date: 9/20/2021    Date Pronounced Dead: 9/24/2021     Admitting Physician: Estela Jordan MD     Primary Care Physician: Roberto Barry DO     Primary Discharge Diagnoses:   Patient Active Problem List    Diagnosis Date Noted    Acute respiratory failure with hypoxia ([facility] [address] 09/21/2021    COVID-19 virus infection 09/21/2021    Admission for palliative care 09/21/2021    End stage renal disease ([address] 09/21/2021    Uremia of renal origin /  patient discontinued dialysis 09/21/2021    Anemia 04/08/2019    Anemia due to blood loss 04/04/2019    Hyperkalemia 04/03/2019    Acute renal failure superimposed on chronic kidney disease ([address] 04/03/2019    Vaginal mass 04/03/2019    Abdominal pain     Endometrial disorder     Pelvic mass     Chronic diastolic CHF (congestive heart failure), NYHA class 3 ([address] 04/02/2019    Toxic encephalopathy 04/02/2019    Urinary retention 04/01/2019    Hypoxia 04/01/2019    Obesity     Seizures ([address]     Chronic renal insufficiency     Pelvic pain 03/31/2019    CKD (chronic kidney disease) stage 3, GFR 30-59 ml/min ([address] 10/28/2015    Essential hypertension 10/28/2015    Hemorrhagic cerebrovascular accident (CVA) ([address] 10/28/2015    Cholelithiasis 04/12/2012    Cerebral aneurysm 01/01/1997       Preliminary Cause of Death:  Uremia, CKD. Patient stopped dialysis several months ago. COVID-19 with acute respiratory failure and hypoxia    Referred to  (yes/no):  no    Hospital Course: The patient was admitted for  COVID-19 positive test (U07.1, COVID-19) with Acute Pneumonia (J12.89, Other viral pneumonia)  (If respiratory failure or sepsis present, add as separate assessment)    With acute respiratory failure and hypoxia and uremia due to CKD and refusal for dialysis.   She was treated palliatively with morphine and Ativan for comfort measures over the course of her hospitalization. Patient was provided oxygen support.     Discharge Exam:    Verified no blood pressure, pulse or spontaneous respirations    Consultants:    · none    Procedures:    · none    Complications:   · none    Significant Diagnostic Studies:   · Discharge Labs:  Lab Results   Component Value Date    WBC 13.8 (H) 09/20/2021    HGB 9.9 (L) 09/20/2021    MCV 96.9 09/20/2021     (L) 09/20/2021      Lab Results   Component Value Date    GLUCOSE 104 (H) 09/21/2021     (HH) 09/21/2021    CREATININE 13.61 (HH) 09/21/2021     09/21/2021    K 4.6 09/21/2021    CALCIUM 10.0 09/21/2021     (H) 09/21/2021    CO2 8 (LL) 09/21/2021       Total time spent on discharge services: 40 minutes  Including the following activities:  · Coordination with Case Management and/or  and/or [address]  · Coordination of care with Consultants (if applicable)   · Discussion with family / pastoral care  · Preparation of Discharge Summary      Signed:  MIKO Christensen - FROILAN, MIKO-CNP  9/24/2021  3:22 PM

## 2021-09-24 NOTE — PROGRESS NOTES
Attempted to speak with patients daughter Wagner Wilson via phone regarding Hospice referral. No answer and unable to leave voicemail at this time as the voicemail box is full.      [address] Nurse Coordinator  9/24/2021 9:25 AM

## 2021-09-24 NOTE — PROGRESS NOTES
Per recommendation of Yana Joe, [facility] pastoral care called to sit with patient so she is not alone until the family can come and see her.  Writer did contact both listed contacts, patients , Ness King states he will be in to see the patient at about 11 am.

## 2021-09-24 NOTE — PROGRESS NOTES
Jesus Maddox with patients nurse. Momo Cohen is currently agonal breathing. Comfort medications provided and patient repositioned. Attempted to call saul Howell via phone. No answer and unable to leave voicemail. [phone] Saul Howell returns phone call. Discussed decline in patients condition. States that she was planning to come at 6 with her father. Encouraged her to come as soon as possible. Support provided.     [address] and Yony Nurse Coordinator  9/24/2021 9:33 AM

## 2021-09-24 NOTE — PROGRESS NOTES
Writer in patients room and noted that patients respirations are 25 breaths per minute and are raspy. Writer suctioned patient and administered PRN Morphine as well as Atropine. Clements catheter collection bag emptied of 100 ml yellow/cloudy malodorous urine.

## 2021-09-24 NOTE — PROGRESS NOTES
Spent time with PT. PT is breathing very heavy and unresponsive. Prayed with and for patient. Played music and spent time with Pt's family. Pt's  said he would not be staying long. I gave them some time with PT and will follow back up this morning.

## 2021-09-24 NOTE — PROGRESS NOTES
Patients body taken to this facilities morgue while we await contact from family for nursing home decision.

## 2023-04-26 NOTE — ED NOTES
Unable to obtain second blood culture      Tessa Juna RN  09/20/21 7314 Epidermal Autograft Text: The defect edges were debeveled with a #15 scalpel blade.  Given the location of the defect, shape of the defect and the proximity to free margins an epidermal autograft was deemed most appropriate.  Using a sterile surgical marker, the primary defect shape was transferred to the donor site. The epidermal graft was then harvested.  The skin graft was then placed in the primary defect and oriented appropriately.